# Patient Record
Sex: FEMALE | Race: WHITE | NOT HISPANIC OR LATINO
[De-identification: names, ages, dates, MRNs, and addresses within clinical notes are randomized per-mention and may not be internally consistent; named-entity substitution may affect disease eponyms.]

---

## 2017-04-02 ENCOUNTER — RESULT REVIEW (OUTPATIENT)
Age: 72
End: 2017-04-02

## 2017-04-03 ENCOUNTER — APPOINTMENT (OUTPATIENT)
Dept: OBGYN | Facility: CLINIC | Age: 72
End: 2017-04-03

## 2019-02-20 ENCOUNTER — TRANSCRIPTION ENCOUNTER (OUTPATIENT)
Age: 74
End: 2019-02-20

## 2019-02-20 ENCOUNTER — APPOINTMENT (OUTPATIENT)
Dept: ORTHOPEDIC SURGERY | Facility: CLINIC | Age: 74
End: 2019-02-20
Payer: MEDICARE

## 2019-02-20 VITALS
DIASTOLIC BLOOD PRESSURE: 75 MMHG | HEIGHT: 66 IN | HEART RATE: 60 BPM | SYSTOLIC BLOOD PRESSURE: 131 MMHG | WEIGHT: 130 LBS | BODY MASS INDEX: 20.89 KG/M2

## 2019-02-20 VITALS — DIASTOLIC BLOOD PRESSURE: 74 MMHG | SYSTOLIC BLOOD PRESSURE: 122 MMHG

## 2019-02-20 DIAGNOSIS — Z87.39 PERSONAL HISTORY OF OTHER DISEASES OF THE MUSCULOSKELETAL SYSTEM AND CONNECTIVE TISSUE: ICD-10-CM

## 2019-02-20 DIAGNOSIS — M25.561 PAIN IN RIGHT KNEE: ICD-10-CM

## 2019-02-20 PROCEDURE — 99204 OFFICE O/P NEW MOD 45 MIN: CPT

## 2019-02-20 PROCEDURE — 73564 X-RAY EXAM KNEE 4 OR MORE: CPT | Mod: RT

## 2019-02-20 PROCEDURE — 73502 X-RAY EXAM HIP UNI 2-3 VIEWS: CPT | Mod: RT

## 2019-02-20 NOTE — PHYSICAL EXAM
[de-identified] : Patient is well nourished, well-developed, in no acute distress, with appropriate mood and affect. The patient is oriented to time, place, and person. Respirations are even and unlabored. Gait evaluation reveals a limp. There is no inguinal adenopathy. Examination of the contralateral hip shows normal range of motion, strength, no tenderness, and intact skin. The affected limb is well-perfused, shows a grossly normal motor and sensory examination. Examination of the hip shows no skin lesions. Hip motion is reduced and causes pain. Leg lengths are approximately short on the right by 1 cm. Stinchfield test is positive. Both hips are stable and muscle strength is normal.  Right knee motion is painless and the knee moves from 0-135 degrees. The knee is stable within that range-of-motion to AP and ML stress with a 1A Lachman, negative anterior or posterior drawer and no instability to varus or valgus stress. The alignment of the knee is 5 degrees of varus. No effusion or crepitus is noted. No tenderness to palpation about the medial or lateral joint line, medial or lateral tibial plateau, medial or lateral femoral condyle, medial or lateral patellar facets, superior or inferior pole of the patella. Lavell's is negative. Muscle strength is normal. Pedal pulses are palpable.  [de-identified] : AP and lateral x-rays of the right hip, pelvis, and femur were ordered and taken in the office and demonstrate severe degenerative joint disease of the hip with joint space narrowing, osteophyte formation, and subchondral sclerosis.\par \par Long standing knee, AP knee, lateral knee, and patellar views of the right knee were ordered and taken in the office and demonstrate no evidence of degenerative joint disease of the knee, fractures, intra-articular pathology.

## 2019-02-20 NOTE — DISCUSSION/SUMMARY
[de-identified] : This patient has severe right hip osteoarthritis.  She has failed a course of conservative management and would like to proceed with a direct anterior approach right total hip arthroplasty.\par \par The patient is an appropriate candidate for consideration of right total hip replacement. An extensive discussion was conducted of the natural history of the disease and the variety of surgical and non-surgical treatment options available to the patient. A risk/benefit analysis was discussed with the patient reviewing the advantages and disadvantages of surgical intervention at this time. Both the level and length of the patient's pain have made additional conservative treatment measures consisting of physical therapy, and/or corticosteroids contraindicated. A full explanation was given of the nature and the purpose of the procedure and anesthesia, its benefits, possible alternative methods of diagnosis or treatment, the risks involved, the possibility of complications, the foreseeable consequences of the procedure and the possible results of the non-treatment.\par \par No guarantee or assurance was made as to the results that may be obtained. Specifically, the risks were identified to include, but are not limited to the following: Infection, phlebitis, pulmonary embolism, death, paralysis, dislocation, pain, stiffness, instability, limp, weakness, breakage, leg-length inequality, uncontrolled bleeding, nerve injury, blood vessel injury, pressure sores, anesthetic risks, delayed healing of wound and bone, and wear and loosening. Further discussion was undertaken with the patient about the details of surgical preparation, treatment, and postoperative rehabilitation including medical clearance, autotransfusion, the hospital course, and the postoperative rehabilitation involved. All in all, I feel that this patient is a good candidate for surgical reconstruction.\par

## 2019-02-20 NOTE — HISTORY OF PRESENT ILLNESS
[de-identified] : This is very nice 73-year-old female experiencing 3 years of right hip pain, which is severe in intensity. The pain substantially limits activities of daily living. Walking tolerance is reduced.  She has taken diclofenac in the past and is now on Celebrex which helps moderately.  The pain radiates to the groin and thigh into the knee.  She is also tried to intra-articular cortisone injections with the last injection being performed approximately 8 months ago which helped for only 2-3 weeks.  Physical therapy has been tried in the past which has not helped and in fact worsened the pain.  She does not use a cane or walker.  She has difficulty sleeping at night.  She has difficulty rising from a chair and walking up and down stairs secondary to the pain.  The pain does not radiate below the knee and it is not associated with numbness or tingling or weakness.

## 2019-03-05 ENCOUNTER — APPOINTMENT (OUTPATIENT)
Dept: ORTHOPEDIC SURGERY | Facility: CLINIC | Age: 74
End: 2019-03-05
Payer: MEDICARE

## 2019-03-05 VITALS — HEIGHT: 66 IN | BODY MASS INDEX: 20.89 KG/M2 | WEIGHT: 130 LBS

## 2019-03-05 DIAGNOSIS — M54.16 RADICULOPATHY, LUMBAR REGION: ICD-10-CM

## 2019-03-05 DIAGNOSIS — Z82.61 FAMILY HISTORY OF ARTHRITIS: ICD-10-CM

## 2019-03-05 PROCEDURE — 99213 OFFICE O/P EST LOW 20 MIN: CPT

## 2019-03-05 RX ORDER — MULTIVITAMIN
TABLET ORAL
Refills: 0 | Status: ACTIVE | COMMUNITY

## 2019-03-05 NOTE — DISCUSSION/SUMMARY
[de-identified] : This patient has severe right hip osteoarthritis.  She has failed a course of conservative management and would like to proceed with a direct anterior approach right total hip arthroplasty.\par \par The patient is an appropriate candidate for consideration of right total hip replacement. An extensive discussion was conducted of the natural history of the disease and the variety of surgical and non-surgical treatment options available to the patient. A risk/benefit analysis was discussed with the patient reviewing the advantages and disadvantages of surgical intervention at this time. Both the level and length of the patient's pain have made additional conservative treatment measures consisting of physical therapy, and/or corticosteroids contraindicated. A full explanation was given of the nature and the purpose of the procedure and anesthesia, its benefits, possible alternative methods of diagnosis or treatment, the risks involved, the possibility of complications, the foreseeable consequences of the procedure and the possible results of the non-treatment.\par \par No guarantee or assurance was made as to the results that may be obtained. Specifically, the risks were identified to include, but are not limited to the following: Infection, phlebitis, pulmonary embolism, death, paralysis, dislocation, pain, stiffness, instability, limp, weakness, breakage, leg-length inequality, uncontrolled bleeding, nerve injury, blood vessel injury, pressure sores, anesthetic risks, delayed healing of wound and bone, and wear and loosening. Further discussion was undertaken with the patient about the details of surgical preparation, treatment, and postoperative rehabilitation including medical clearance, autotransfusion, the hospital course, and the postoperative rehabilitation involved. All in all, I feel that this patient is a good candidate for surgical reconstruction.\par \par Should her right lower extremity radiculopathy is now flaring.  I have given her a physiatry referral for further workup for this.\par

## 2019-03-05 NOTE — PHYSICAL EXAM
[de-identified] : Patient is well nourished, well-developed, in no acute distress, with appropriate mood and affect. The patient is oriented to time, place, and person. Respirations are even and unlabored. Gait evaluation reveals a limp. There is no inguinal adenopathy. Examination of the contralateral hip shows normal range of motion, strength, no tenderness, and intact skin. The affected limb is well-perfused, shows a grossly normal motor and sensory examination. Examination of the hip shows no skin lesions. Hip motion is reduced and causes pain. Leg lengths are approximately short on the right by 1 cm. Stinchfield test is positive. Both hips are stable and muscle strength is normal.  Right knee motion is painless and the knee moves from 0-135 degrees. The knee is stable within that range-of-motion to AP and ML stress with a 1A Lachman, negative anterior or posterior drawer and no instability to varus or valgus stress. The alignment of the knee is 5 degrees of varus. No effusion or crepitus is noted. No tenderness to palpation about the medial or lateral joint line, medial or lateral tibial plateau, medial or lateral femoral condyle, medial or lateral patellar facets, superior or inferior pole of the patella. Lavell's is negative. Muscle strength is normal. Pedal pulses are palpable. \par Examination of the lumbar spine reveals full range of motion without pain. There is no tenderness to palpation of the osseous structures or paravertebral soft tissues. There is no muscle spasm. Straight leg raise is negative bilaterally. [de-identified] : AP and lateral x-rays of the right hip, pelvis, and femur were reviewed from the previous visit and demonstrate degenerative joint disease of the hip with joint space narrowing, osteophyte formation, and subchondral sclerosis.\par

## 2019-03-05 NOTE — HISTORY OF PRESENT ILLNESS
[de-identified] : This is very nice 73-year-old female experiencing 3 years of right hip pain, which is severe in intensity.  I have previously diagnosed her with right hip osteoarthritis.  The pain substantially limits activities of daily living. Walking tolerance is reduced.  She has taking diclofenac as opposed to Celebrex which helps moderately.  The pain radiates to the groin and thigh into the knee.  Her last intra-articular cortisone injection was 2 months ago.  This did not help to improve the pain. Physical therapy has been tried in the past which has not helped and in fact worsened the pain.  She does not use a cane or walker.  She has difficulty sleeping at night.  She has difficulty rising from a chair and walking up and down stairs secondary to the pain.  Now the pain does radiate to the foot but it is not associated with numbness or tingling or.  She does have low back pain.  No bowel or bladder incontinence.

## 2019-03-06 ENCOUNTER — APPOINTMENT (OUTPATIENT)
Dept: MRI IMAGING | Facility: CLINIC | Age: 74
End: 2019-03-06
Payer: MEDICARE

## 2019-03-06 ENCOUNTER — OUTPATIENT (OUTPATIENT)
Dept: OUTPATIENT SERVICES | Facility: HOSPITAL | Age: 74
LOS: 1 days | End: 2019-03-06
Payer: MEDICARE

## 2019-03-06 ENCOUNTER — TRANSCRIPTION ENCOUNTER (OUTPATIENT)
Age: 74
End: 2019-03-06

## 2019-03-06 DIAGNOSIS — Z00.8 ENCOUNTER FOR OTHER GENERAL EXAMINATION: ICD-10-CM

## 2019-03-06 PROCEDURE — 72148 MRI LUMBAR SPINE W/O DYE: CPT | Mod: 26

## 2019-03-06 PROCEDURE — 72148 MRI LUMBAR SPINE W/O DYE: CPT

## 2019-04-01 ENCOUNTER — OUTPATIENT (OUTPATIENT)
Dept: OUTPATIENT SERVICES | Facility: HOSPITAL | Age: 74
LOS: 1 days | End: 2019-04-01
Payer: MEDICARE

## 2019-04-01 VITALS
OXYGEN SATURATION: 99 % | RESPIRATION RATE: 20 BRPM | TEMPERATURE: 98 F | HEIGHT: 66 IN | SYSTOLIC BLOOD PRESSURE: 121 MMHG | WEIGHT: 134.92 LBS | DIASTOLIC BLOOD PRESSURE: 78 MMHG | HEART RATE: 69 BPM

## 2019-04-01 DIAGNOSIS — Z01.818 ENCOUNTER FOR OTHER PREPROCEDURAL EXAMINATION: ICD-10-CM

## 2019-04-01 DIAGNOSIS — M19.90 UNSPECIFIED OSTEOARTHRITIS, UNSPECIFIED SITE: ICD-10-CM

## 2019-04-01 DIAGNOSIS — Z90.721 ACQUIRED ABSENCE OF OVARIES, UNILATERAL: Chronic | ICD-10-CM

## 2019-04-01 DIAGNOSIS — M16.11 UNILATERAL PRIMARY OSTEOARTHRITIS, RIGHT HIP: ICD-10-CM

## 2019-04-01 LAB
ANION GAP SERPL CALC-SCNC: 10 MMOL/L — SIGNIFICANT CHANGE UP (ref 5–17)
BLD GP AB SCN SERPL QL: NEGATIVE — SIGNIFICANT CHANGE UP
BUN SERPL-MCNC: 20 MG/DL — SIGNIFICANT CHANGE UP (ref 7–23)
CALCIUM SERPL-MCNC: 9.7 MG/DL — SIGNIFICANT CHANGE UP (ref 8.4–10.5)
CHLORIDE SERPL-SCNC: 104 MMOL/L — SIGNIFICANT CHANGE UP (ref 96–108)
CO2 SERPL-SCNC: 27 MMOL/L — SIGNIFICANT CHANGE UP (ref 22–31)
CREAT SERPL-MCNC: 0.72 MG/DL — SIGNIFICANT CHANGE UP (ref 0.5–1.3)
GLUCOSE SERPL-MCNC: 92 MG/DL — SIGNIFICANT CHANGE UP (ref 70–99)
HBA1C BLD-MCNC: 5.2 % — SIGNIFICANT CHANGE UP (ref 4–5.6)
HCT VFR BLD CALC: 41 % — SIGNIFICANT CHANGE UP (ref 34.5–45)
HGB BLD-MCNC: 13.5 G/DL — SIGNIFICANT CHANGE UP (ref 11.5–15.5)
MCHC RBC-ENTMCNC: 30.5 PG — SIGNIFICANT CHANGE UP (ref 27–34)
MCHC RBC-ENTMCNC: 32.9 GM/DL — SIGNIFICANT CHANGE UP (ref 32–36)
MCV RBC AUTO: 92.8 FL — SIGNIFICANT CHANGE UP (ref 80–100)
MRSA PCR RESULT.: SIGNIFICANT CHANGE UP
PLATELET # BLD AUTO: 186 K/UL — SIGNIFICANT CHANGE UP (ref 150–400)
POTASSIUM SERPL-MCNC: 4.2 MMOL/L — SIGNIFICANT CHANGE UP (ref 3.5–5.3)
POTASSIUM SERPL-SCNC: 4.2 MMOL/L — SIGNIFICANT CHANGE UP (ref 3.5–5.3)
RBC # BLD: 4.42 M/UL — SIGNIFICANT CHANGE UP (ref 3.8–5.2)
RBC # FLD: 13.1 % — SIGNIFICANT CHANGE UP (ref 10.3–14.5)
RH IG SCN BLD-IMP: POSITIVE — SIGNIFICANT CHANGE UP
S AUREUS DNA NOSE QL NAA+PROBE: SIGNIFICANT CHANGE UP
SODIUM SERPL-SCNC: 141 MMOL/L — SIGNIFICANT CHANGE UP (ref 135–145)
WBC # BLD: 6.06 K/UL — SIGNIFICANT CHANGE UP (ref 3.8–10.5)
WBC # FLD AUTO: 6.06 K/UL — SIGNIFICANT CHANGE UP (ref 3.8–10.5)

## 2019-04-01 PROCEDURE — 87641 MR-STAPH DNA AMP PROBE: CPT

## 2019-04-01 PROCEDURE — 85027 COMPLETE CBC AUTOMATED: CPT

## 2019-04-01 PROCEDURE — 83036 HEMOGLOBIN GLYCOSYLATED A1C: CPT

## 2019-04-01 PROCEDURE — G0463: CPT

## 2019-04-01 PROCEDURE — 86900 BLOOD TYPING SEROLOGIC ABO: CPT

## 2019-04-01 PROCEDURE — 86850 RBC ANTIBODY SCREEN: CPT

## 2019-04-01 PROCEDURE — 80048 BASIC METABOLIC PNL TOTAL CA: CPT

## 2019-04-01 PROCEDURE — 87640 STAPH A DNA AMP PROBE: CPT

## 2019-04-01 PROCEDURE — 86901 BLOOD TYPING SEROLOGIC RH(D): CPT

## 2019-04-01 RX ORDER — CEFAZOLIN SODIUM 1 G
2000 VIAL (EA) INJECTION ONCE
Qty: 0 | Refills: 0 | Status: DISCONTINUED | OUTPATIENT
Start: 2019-04-11 | End: 2019-04-12

## 2019-04-01 NOTE — H&P PST ADULT - ASSESSMENT
CAPRINI SCORE [CLOT]    AGE RELATED RISK FACTORS                                                       MOBILITY RELATED FACTORS  [ ] Age 41-60 years                                            (1 Point)                  [ ] Bed rest                                                        (1 Point)  [x ] Age: 61-74 years                                           (2 Points)                 [ ] Plaster cast                                                   (2 Points)  [ ] Age= 75 years                                              (3 Points)                 [ ] Bed bound for more than 72 hours                 (2 Points)    DISEASE RELATED RISK FACTORS                                               GENDER SPECIFIC FACTORS  [ ] Edema in the lower extremities                       (1 Point)                  [ ] Pregnancy                                                     (1 Point)  [ ] Varicose veins                                               (1 Point)                  [ ] Post-partum < 6 weeks                                   (1 Point)             [ ] BMI > 25 Kg/m2                                            (1 Point)                  [ ] Hormonal therapy  or oral contraception          (1 Point)                 [ ] Sepsis (in the previous month)                        (1 Point)                  [ ] History of pregnancy complications                 (1 point)  [ ] Pneumonia or serious lung disease                                               [ ] Unexplained or recurrent                     (1 Point)           (in the previous month)                               (1 Point)  [ ] Abnormal pulmonary function test                     (1 Point)                 SURGERY RELATED RISK FACTORS  [ ] Acute myocardial infarction                              (1 Point)                 [ ]  Section                                             (1 Point)  [ ] Congestive heart failure (in the previous month)  (1 Point)               [ ] Minor surgery                                                  (1 Point)   [ ] Inflammatory bowel disease                             (1 Point)                 [ ] Arthroscopic surgery                                        (2 Points)  [ ] Central venous access                                      (2 Points)                [x ] General surgery lasting more than 45 minutes   (2 Points)       [ ] Stroke (in the previous month)                          (5 Points)               [ ] Elective arthroplasty                                         (5 Points)                                                                                                                                               HEMATOLOGY RELATED FACTORS                                                 TRAUMA RELATED RISK FACTORS  [ ] Prior episodes of VTE                                     (3 Points)                 [ ] Fracture of the hip, pelvis, or leg                       (5 Points)  [ ] Positive family history for VTE                         (3 Points)                 [ ] Acute spinal cord injury (in the previous month)  (5 Points)  [ ] Prothrombin 09886 A                                     (3 Points)                 [ ] Paralysis  (less than 1 month)                             (5 Points)  [ ] Factor V Leiden                                             (3 Points)                  [ ] Multiple Trauma within 1 month                        (5 Points)  [ ] Lupus anticoagulants                                     (3 Points)                                                           [ ] Anticardiolipin antibodies                               (3 Points)                                                       [ ] High homocysteine in the blood                      (3 Points)                                             [ ] Other congenital or acquired thrombophilia      (3 Points)                                                [ ] Heparin induced thrombocytopenia                  (3 Points)                                          Total Score [   4       ]

## 2019-04-01 NOTE — H&P PST ADULT - NSICDXPROBLEM_GEN_ALL_CORE_FT
PROBLEM DIAGNOSES  Problem: OA (osteoarthritis)  Assessment and Plan: Right THR   cbc, bmp, T & S, MRSA   dental clearance in chart       R/O PROBLEM DIAGNOSES  Problem: Prophylactic measure  Assessment and Plan: The Caprini score indicates this patient is at risk for a VTE event (score 3-5).  Most surgical patients in this group would benefit from pharmacologic prophylaxis.  The surgical team will determine the balance between VTE risk and bleeding risk

## 2019-04-08 ENCOUNTER — APPOINTMENT (OUTPATIENT)
Dept: ORTHOPEDIC SURGERY | Facility: CLINIC | Age: 74
End: 2019-04-08
Payer: MEDICARE

## 2019-04-08 PROBLEM — M19.90 UNSPECIFIED OSTEOARTHRITIS, UNSPECIFIED SITE: Chronic | Status: ACTIVE | Noted: 2019-04-01

## 2019-04-08 PROCEDURE — 99213 OFFICE O/P EST LOW 20 MIN: CPT | Mod: PD

## 2019-04-08 RX ORDER — SUMATRIPTAN 100 MG/1
100 TABLET, FILM COATED ORAL
Qty: 9 | Refills: 0 | Status: ACTIVE | COMMUNITY
Start: 2018-03-07

## 2019-04-08 NOTE — HISTORY OF PRESENT ILLNESS
[de-identified] : This is very nice 73-year-old female experiencing 3 years of right hip pain, which is severe in intensity.  I have previously diagnosed her with right hip osteoarthritis.  We are planning a hip replacement this Thursday for her. The pain substantially limits activities of daily living. Walking tolerance is reduced.  She has taking diclofenac as opposed to Celebrex which helps moderately.  The pain radiates to the groin and thigh into the knee.  Her last intra-articular cortisone injection was 3 months ago.  This did not help to improve the pain. Physical therapy has been tried in the past which has not helped and in fact worsened the pain.  She does not use a cane or walker.  She has difficulty sleeping at night.  She has difficulty rising from a chair and walking up and down stairs secondary to the pain.  Now the pain does radiate to the foot but it is not associated with numbness or tingling or.  She does have low back pain.  No bowel or bladder incontinence.  Also has some right knee pain.

## 2019-04-08 NOTE — DISCUSSION/SUMMARY
[de-identified] : This patient has severe right hip osteoarthritis.  She has failed a course of conservative management and would like to proceed with a direct anterior approach right total hip arthroplasty.\par \par The patient is an appropriate candidate for consideration of right total hip replacement. An extensive discussion was conducted of the natural history of the disease and the variety of surgical and non-surgical treatment options available to the patient. A risk/benefit analysis was discussed with the patient reviewing the advantages and disadvantages of surgical intervention at this time. Both the level and length of the patient's pain have made additional conservative treatment measures consisting of physical therapy, and/or corticosteroids contraindicated. A full explanation was given of the nature and the purpose of the procedure and anesthesia, its benefits, possible alternative methods of diagnosis or treatment, the risks involved, the possibility of complications, the foreseeable consequences of the procedure and the possible results of the non-treatment.\par \par No guarantee or assurance was made as to the results that may be obtained. Specifically, the risks were identified to include, but are not limited to the following: Infection, phlebitis, pulmonary embolism, death, paralysis, dislocation, pain, stiffness, instability, limp, weakness, breakage, leg-length inequality, uncontrolled bleeding, nerve injury, blood vessel injury, pressure sores, anesthetic risks, delayed healing of wound and bone, and wear and loosening. Further discussion was undertaken with the patient about the details of surgical preparation, treatment, and postoperative rehabilitation including medical clearance, autotransfusion, the hospital course, and the postoperative rehabilitation involved. All in all, I feel that this patient is a good candidate for surgical reconstruction.

## 2019-04-10 ENCOUNTER — TRANSCRIPTION ENCOUNTER (OUTPATIENT)
Age: 74
End: 2019-04-10

## 2019-04-10 PROBLEM — M16.11 ARTHRITIS OF RIGHT HIP: Status: RESOLVED | Noted: 2019-02-20 | Resolved: 2019-04-10

## 2019-04-10 PROBLEM — M25.551 RIGHT HIP PAIN: Status: RESOLVED | Noted: 2019-02-19 | Resolved: 2019-04-10

## 2019-04-11 ENCOUNTER — APPOINTMENT (OUTPATIENT)
Dept: ORTHOPEDIC SURGERY | Facility: HOSPITAL | Age: 74
End: 2019-04-11

## 2019-04-11 ENCOUNTER — INPATIENT (INPATIENT)
Facility: HOSPITAL | Age: 74
LOS: 0 days | Discharge: HOME | DRG: 470 | End: 2019-04-12
Attending: ORTHOPAEDIC SURGERY | Admitting: ORTHOPAEDIC SURGERY
Payer: MEDICARE

## 2019-04-11 VITALS
SYSTOLIC BLOOD PRESSURE: 132 MMHG | DIASTOLIC BLOOD PRESSURE: 78 MMHG | RESPIRATION RATE: 18 BRPM | HEART RATE: 66 BPM | TEMPERATURE: 98 F

## 2019-04-11 DIAGNOSIS — M25.551 PAIN IN RIGHT HIP: ICD-10-CM

## 2019-04-11 DIAGNOSIS — M16.11 UNILATERAL PRIMARY OSTEOARTHRITIS, RIGHT HIP: ICD-10-CM

## 2019-04-11 DIAGNOSIS — Z90.721 ACQUIRED ABSENCE OF OVARIES, UNILATERAL: Chronic | ICD-10-CM

## 2019-04-11 PROCEDURE — 72170 X-RAY EXAM OF PELVIS: CPT | Mod: 26

## 2019-04-11 PROCEDURE — 27130 TOTAL HIP ARTHROPLASTY: CPT | Mod: RT

## 2019-04-11 RX ORDER — SODIUM CHLORIDE 9 MG/ML
3 INJECTION INTRAMUSCULAR; INTRAVENOUS; SUBCUTANEOUS EVERY 8 HOURS
Qty: 0 | Refills: 0 | Status: DISCONTINUED | OUTPATIENT
Start: 2019-04-11 | End: 2019-04-11

## 2019-04-11 RX ORDER — KETOROLAC TROMETHAMINE 30 MG/ML
15 SYRINGE (ML) INJECTION EVERY 8 HOURS
Qty: 0 | Refills: 0 | Status: DISCONTINUED | OUTPATIENT
Start: 2019-04-11 | End: 2019-04-12

## 2019-04-11 RX ORDER — PANTOPRAZOLE SODIUM 20 MG/1
40 TABLET, DELAYED RELEASE ORAL
Qty: 0 | Refills: 0 | Status: DISCONTINUED | OUTPATIENT
Start: 2019-04-11 | End: 2019-04-12

## 2019-04-11 RX ORDER — SENNA PLUS 8.6 MG/1
2 TABLET ORAL AT BEDTIME
Qty: 0 | Refills: 0 | Status: DISCONTINUED | OUTPATIENT
Start: 2019-04-11 | End: 2019-04-12

## 2019-04-11 RX ORDER — ASCORBIC ACID 60 MG
500 TABLET,CHEWABLE ORAL
Qty: 0 | Refills: 0 | Status: DISCONTINUED | OUTPATIENT
Start: 2019-04-11 | End: 2019-04-12

## 2019-04-11 RX ORDER — SODIUM CHLORIDE 9 MG/ML
1000 INJECTION, SOLUTION INTRAVENOUS
Qty: 0 | Refills: 0 | Status: DISCONTINUED | OUTPATIENT
Start: 2019-04-11 | End: 2019-04-12

## 2019-04-11 RX ORDER — CELECOXIB 200 MG/1
200 CAPSULE ORAL EVERY 12 HOURS
Qty: 0 | Refills: 0 | Status: DISCONTINUED | OUTPATIENT
Start: 2019-04-11 | End: 2019-04-11

## 2019-04-11 RX ORDER — ACETAMINOPHEN 500 MG
1000 TABLET ORAL ONCE
Qty: 0 | Refills: 0 | Status: COMPLETED | OUTPATIENT
Start: 2019-04-12 | End: 2019-04-12

## 2019-04-11 RX ORDER — SODIUM CHLORIDE 9 MG/ML
500 INJECTION INTRAMUSCULAR; INTRAVENOUS; SUBCUTANEOUS
Qty: 0 | Refills: 0 | Status: DISCONTINUED | OUTPATIENT
Start: 2019-04-11 | End: 2019-04-11

## 2019-04-11 RX ORDER — GABAPENTIN 400 MG/1
100 CAPSULE ORAL ONCE
Qty: 0 | Refills: 0 | Status: COMPLETED | OUTPATIENT
Start: 2019-04-11 | End: 2019-04-11

## 2019-04-11 RX ORDER — DOCUSATE SODIUM 100 MG
100 CAPSULE ORAL THREE TIMES A DAY
Qty: 0 | Refills: 0 | Status: DISCONTINUED | OUTPATIENT
Start: 2019-04-11 | End: 2019-04-12

## 2019-04-11 RX ORDER — MAGNESIUM HYDROXIDE 400 MG/1
30 TABLET, CHEWABLE ORAL DAILY
Qty: 0 | Refills: 0 | Status: DISCONTINUED | OUTPATIENT
Start: 2019-04-11 | End: 2019-04-12

## 2019-04-11 RX ORDER — CELECOXIB 200 MG/1
200 CAPSULE ORAL EVERY 12 HOURS
Qty: 0 | Refills: 0 | Status: CANCELLED | OUTPATIENT
Start: 2019-04-14 | End: 2019-04-12

## 2019-04-11 RX ORDER — ACETAMINOPHEN 500 MG
975 TABLET ORAL EVERY 8 HOURS
Qty: 0 | Refills: 0 | Status: DISCONTINUED | OUTPATIENT
Start: 2019-04-11 | End: 2019-04-11

## 2019-04-11 RX ORDER — SUMATRIPTAN SUCCINATE 4 MG/.5ML
50 INJECTION, SOLUTION SUBCUTANEOUS DAILY
Qty: 0 | Refills: 0 | Status: DISCONTINUED | OUTPATIENT
Start: 2019-04-11 | End: 2019-04-12

## 2019-04-11 RX ORDER — CEFAZOLIN SODIUM 1 G
2000 VIAL (EA) INJECTION EVERY 8 HOURS
Qty: 0 | Refills: 0 | Status: COMPLETED | OUTPATIENT
Start: 2019-04-11 | End: 2019-04-12

## 2019-04-11 RX ORDER — DEXAMETHASONE 0.5 MG/5ML
8 ELIXIR ORAL ONCE
Qty: 0 | Refills: 0 | Status: COMPLETED | OUTPATIENT
Start: 2019-04-12 | End: 2019-04-12

## 2019-04-11 RX ORDER — FERROUS SULFATE 325(65) MG
325 TABLET ORAL
Qty: 0 | Refills: 0 | Status: DISCONTINUED | OUTPATIENT
Start: 2019-04-11 | End: 2019-04-12

## 2019-04-11 RX ORDER — TRAMADOL HYDROCHLORIDE 50 MG/1
50 TABLET ORAL ONCE
Qty: 0 | Refills: 0 | Status: DISCONTINUED | OUTPATIENT
Start: 2019-04-11 | End: 2019-04-11

## 2019-04-11 RX ORDER — TRAMADOL HYDROCHLORIDE 50 MG/1
50 TABLET ORAL EVERY 6 HOURS
Qty: 0 | Refills: 0 | Status: DISCONTINUED | OUTPATIENT
Start: 2019-04-11 | End: 2019-04-12

## 2019-04-11 RX ORDER — ACETAMINOPHEN 500 MG
975 TABLET ORAL ONCE
Qty: 0 | Refills: 0 | Status: COMPLETED | OUTPATIENT
Start: 2019-04-11 | End: 2019-04-11

## 2019-04-11 RX ORDER — OXYCODONE HYDROCHLORIDE 5 MG/1
10 TABLET ORAL EVERY 4 HOURS
Qty: 0 | Refills: 0 | Status: DISCONTINUED | OUTPATIENT
Start: 2019-04-11 | End: 2019-04-12

## 2019-04-11 RX ORDER — ACETAMINOPHEN 500 MG
1000 TABLET ORAL ONCE
Qty: 0 | Refills: 0 | Status: COMPLETED | OUTPATIENT
Start: 2019-04-11 | End: 2019-04-11

## 2019-04-11 RX ORDER — OXYCODONE HYDROCHLORIDE 5 MG/1
5 TABLET ORAL EVERY 4 HOURS
Qty: 0 | Refills: 0 | Status: DISCONTINUED | OUTPATIENT
Start: 2019-04-11 | End: 2019-04-12

## 2019-04-11 RX ORDER — SODIUM CHLORIDE 9 MG/ML
500 INJECTION INTRAMUSCULAR; INTRAVENOUS; SUBCUTANEOUS ONCE
Qty: 0 | Refills: 0 | Status: COMPLETED | OUTPATIENT
Start: 2019-04-11 | End: 2019-04-11

## 2019-04-11 RX ORDER — ACETAMINOPHEN 500 MG
975 TABLET ORAL EVERY 8 HOURS
Qty: 0 | Refills: 0 | Status: DISCONTINUED | OUTPATIENT
Start: 2019-04-12 | End: 2019-04-12

## 2019-04-11 RX ORDER — ONDANSETRON 8 MG/1
4 TABLET, FILM COATED ORAL EVERY 6 HOURS
Qty: 0 | Refills: 0 | Status: DISCONTINUED | OUTPATIENT
Start: 2019-04-11 | End: 2019-04-12

## 2019-04-11 RX ORDER — PANTOPRAZOLE SODIUM 20 MG/1
40 TABLET, DELAYED RELEASE ORAL ONCE
Qty: 0 | Refills: 0 | Status: COMPLETED | OUTPATIENT
Start: 2019-04-11 | End: 2019-04-11

## 2019-04-11 RX ORDER — SODIUM CHLORIDE 9 MG/ML
500 INJECTION INTRAMUSCULAR; INTRAVENOUS; SUBCUTANEOUS ONCE
Qty: 0 | Refills: 0 | Status: COMPLETED | OUTPATIENT
Start: 2019-04-12 | End: 2019-04-12

## 2019-04-11 RX ORDER — POLYETHYLENE GLYCOL 3350 17 G/17G
17 POWDER, FOR SOLUTION ORAL DAILY
Qty: 0 | Refills: 0 | Status: DISCONTINUED | OUTPATIENT
Start: 2019-04-11 | End: 2019-04-12

## 2019-04-11 RX ORDER — ASPIRIN/CALCIUM CARB/MAGNESIUM 324 MG
325 TABLET ORAL
Qty: 0 | Refills: 0 | Status: DISCONTINUED | OUTPATIENT
Start: 2019-04-11 | End: 2019-04-12

## 2019-04-11 RX ORDER — FOLIC ACID 0.8 MG
1 TABLET ORAL DAILY
Qty: 0 | Refills: 0 | Status: DISCONTINUED | OUTPATIENT
Start: 2019-04-11 | End: 2019-04-12

## 2019-04-11 RX ORDER — KETOROLAC TROMETHAMINE 30 MG/ML
30 SYRINGE (ML) INJECTION EVERY 8 HOURS
Qty: 0 | Refills: 0 | Status: DISCONTINUED | OUTPATIENT
Start: 2019-04-11 | End: 2019-04-11

## 2019-04-11 RX ADMIN — Medication 15 MILLIGRAM(S): at 22:09

## 2019-04-11 RX ADMIN — Medication 100 MILLIGRAM(S): at 23:51

## 2019-04-11 RX ADMIN — PANTOPRAZOLE SODIUM 40 MILLIGRAM(S): 20 TABLET, DELAYED RELEASE ORAL at 12:19

## 2019-04-11 RX ADMIN — TRAMADOL HYDROCHLORIDE 50 MILLIGRAM(S): 50 TABLET ORAL at 14:10

## 2019-04-11 RX ADMIN — SODIUM CHLORIDE 1000 MILLILITER(S): 9 INJECTION INTRAMUSCULAR; INTRAVENOUS; SUBCUTANEOUS at 20:32

## 2019-04-11 RX ADMIN — ONDANSETRON 4 MILLIGRAM(S): 8 TABLET, FILM COATED ORAL at 19:10

## 2019-04-11 RX ADMIN — Medication 325 MILLIGRAM(S): at 18:19

## 2019-04-11 RX ADMIN — GABAPENTIN 100 MILLIGRAM(S): 400 CAPSULE ORAL at 12:19

## 2019-04-11 RX ADMIN — Medication 1000 MILLIGRAM(S): at 18:23

## 2019-04-11 RX ADMIN — Medication 500 MILLIGRAM(S): at 18:22

## 2019-04-11 RX ADMIN — Medication 975 MILLIGRAM(S): at 12:19

## 2019-04-11 RX ADMIN — SODIUM CHLORIDE 1000 MILLILITER(S): 9 INJECTION INTRAMUSCULAR; INTRAVENOUS; SUBCUTANEOUS at 18:00

## 2019-04-11 RX ADMIN — SODIUM CHLORIDE 75 MILLILITER(S): 9 INJECTION, SOLUTION INTRAVENOUS at 18:45

## 2019-04-11 RX ADMIN — Medication 325 MILLIGRAM(S): at 18:24

## 2019-04-11 RX ADMIN — Medication 15 MILLIGRAM(S): at 22:39

## 2019-04-11 RX ADMIN — Medication 400 MILLIGRAM(S): at 18:19

## 2019-04-11 RX ADMIN — Medication 100 MILLIGRAM(S): at 18:24

## 2019-04-11 NOTE — PHYSICAL THERAPY INITIAL EVALUATION ADULT - PERTINENT HX OF CURRENT PROBLEM, REHAB EVAL
72 y/o F with no significant PMH presents with c/o R hip pain for 3 years. Pt diagnosed with R hip osteoarthritis. Pt now presents s/p R THR.

## 2019-04-11 NOTE — PHYSICAL THERAPY INITIAL EVALUATION ADULT - ASR EQUIP NEEDS DISCH PT EVAL
pt given straight cane; as per MD Robles pt to be not d/c'ed with RW if independent with straight cane

## 2019-04-11 NOTE — PATIENT PROFILE ADULT - NSPROGENARRIVEDFROM_GEN_A_NUR
Problem: Goal Outcome Summary  Goal: Goal Outcome Summary  Outcome: Improving  VSS, A&Ox4. CMS intact, dressing CDI. Up A1 to BR, voiding adequately. Scheduled tylenol and flexeril for pain control. Anticoagulant therapy maintained. Pt slept between cares, will continue to monitor.         home

## 2019-04-11 NOTE — PHYSICAL THERAPY INITIAL EVALUATION ADULT - ADDITIONAL COMMENTS
Pt lives in private home with flight to enter from garage and 6 additional steps inside home. Pt lives with spouse who she states can assist her at home. Prior to admission pt was independent with all functional mobility and did not use an AD to ambulate.

## 2019-04-11 NOTE — PHYSICAL THERAPY INITIAL EVALUATION ADULT - PLANNED THERAPY INTERVENTIONS, PT EVAL
GOAL: Pt will independently negotiate stairs with 1 handrail with step over step pattern in 2wks./balance training/strengthening/gait training

## 2019-04-11 NOTE — CHART NOTE - NSCHARTNOTEFT_GEN_A_CORE
Patient seen in RR with  at bedside. Resting with complaints of still having numbness 2/2 spinal anesthesia.  No Chest Pain, SOB, N/V.    T(C): 36.4 (04-11-19 @ 20:15), Max: 36.7 (04-11-19 @ 12:06)  HR: 64 (04-11-19 @ 20:15) (56 - 69)  BP: 125/65 (04-11-19 @ 20:15) (101/63 - 137/76)  RR: 16 (04-11-19 @ 20:15) (14 - 18)  SpO2: 99% (04-11-19 @ 20:15) (96% - 99%)  Wt(kg): --    Exam:  Alert and [place], No Acute Distress  Card: +S1/S2, RRR  Pulm: CTAB  Laterality: R hip aquacel c/d/i  Calves soft, non-tender bilaterally  Unable to assess nv status 2/2 spinal anesthesia  + DP pulse    Xray: Post-op xray in chart             A/P: Patient is a 73y Female S/p R KALPANA Anterior Approach. VSS. NAD  -PT/OT-WBAT RLE  -IS encouraged  -DVT PPx- Aspirin 325 mg BID  -Pain Control PRN  -OOB to chair  -FU AM Labs  -Dispo planning- most likely home    Savannah Haley PA-C  Team Pager #[phone]

## 2019-04-11 NOTE — PRE-ANESTHESIA EVALUATION ADULT - NSANTHOSAYNRD_GEN_A_CORE
No. MEHDI screening performed.  STOP BANG Legend: 0-2 = LOW Risk; 3-4 = INTERMEDIATE Risk; 5-8 = HIGH Risk

## 2019-04-12 ENCOUNTER — TRANSCRIPTION ENCOUNTER (OUTPATIENT)
Age: 74
End: 2019-04-12

## 2019-04-12 VITALS
DIASTOLIC BLOOD PRESSURE: 59 MMHG | OXYGEN SATURATION: 97 % | HEART RATE: 71 BPM | TEMPERATURE: 98 F | RESPIRATION RATE: 18 BRPM | SYSTOLIC BLOOD PRESSURE: 98 MMHG

## 2019-04-12 LAB
ANION GAP SERPL CALC-SCNC: 11 MMOL/L — SIGNIFICANT CHANGE UP (ref 5–17)
BUN SERPL-MCNC: 15 MG/DL — SIGNIFICANT CHANGE UP (ref 7–23)
CALCIUM SERPL-MCNC: 8.9 MG/DL — SIGNIFICANT CHANGE UP (ref 8.4–10.5)
CHLORIDE SERPL-SCNC: 106 MMOL/L — SIGNIFICANT CHANGE UP (ref 96–108)
CO2 SERPL-SCNC: 24 MMOL/L — SIGNIFICANT CHANGE UP (ref 22–31)
CREAT SERPL-MCNC: 0.58 MG/DL — SIGNIFICANT CHANGE UP (ref 0.5–1.3)
GLUCOSE SERPL-MCNC: 124 MG/DL — HIGH (ref 70–99)
HCT VFR BLD CALC: 34.6 % — SIGNIFICANT CHANGE UP (ref 34.5–45)
HGB BLD-MCNC: 11.4 G/DL — LOW (ref 11.5–15.5)
MCHC RBC-ENTMCNC: 30.6 PG — SIGNIFICANT CHANGE UP (ref 27–34)
MCHC RBC-ENTMCNC: 32.9 GM/DL — SIGNIFICANT CHANGE UP (ref 32–36)
MCV RBC AUTO: 92.8 FL — SIGNIFICANT CHANGE UP (ref 80–100)
PLATELET # BLD AUTO: 143 K/UL — LOW (ref 150–400)
POTASSIUM SERPL-MCNC: 4.2 MMOL/L — SIGNIFICANT CHANGE UP (ref 3.5–5.3)
POTASSIUM SERPL-SCNC: 4.2 MMOL/L — SIGNIFICANT CHANGE UP (ref 3.5–5.3)
RBC # BLD: 3.73 M/UL — LOW (ref 3.8–5.2)
RBC # FLD: 12.6 % — SIGNIFICANT CHANGE UP (ref 10.3–14.5)
SODIUM SERPL-SCNC: 141 MMOL/L — SIGNIFICANT CHANGE UP (ref 135–145)
WBC # BLD: 8.94 K/UL — SIGNIFICANT CHANGE UP (ref 3.8–10.5)
WBC # FLD AUTO: 8.94 K/UL — SIGNIFICANT CHANGE UP (ref 3.8–10.5)

## 2019-04-12 PROCEDURE — 85027 COMPLETE CBC AUTOMATED: CPT

## 2019-04-12 PROCEDURE — 97162 PT EVAL MOD COMPLEX 30 MIN: CPT

## 2019-04-12 PROCEDURE — 80048 BASIC METABOLIC PNL TOTAL CA: CPT

## 2019-04-12 PROCEDURE — 97165 OT EVAL LOW COMPLEX 30 MIN: CPT

## 2019-04-12 PROCEDURE — C1713: CPT

## 2019-04-12 PROCEDURE — C1776: CPT

## 2019-04-12 PROCEDURE — 82962 GLUCOSE BLOOD TEST: CPT

## 2019-04-12 PROCEDURE — 72170 X-RAY EXAM OF PELVIS: CPT

## 2019-04-12 RX ORDER — OXYCODONE HYDROCHLORIDE 5 MG/1
1 TABLET ORAL
Qty: 50 | Refills: 0
Start: 2019-04-12

## 2019-04-12 RX ORDER — DICLOFENAC SODIUM 75 MG/1
1 TABLET, DELAYED RELEASE ORAL
Qty: 0 | Refills: 0 | COMMUNITY

## 2019-04-12 RX ORDER — ASPIRIN/CALCIUM CARB/MAGNESIUM 324 MG
1 TABLET ORAL
Qty: 60 | Refills: 0
Start: 2019-04-12 | End: 2019-05-11

## 2019-04-12 RX ORDER — ACETAMINOPHEN 500 MG
3 TABLET ORAL
Qty: 0 | Refills: 0 | DISCHARGE
Start: 2019-04-12

## 2019-04-12 RX ORDER — PANTOPRAZOLE SODIUM 20 MG/1
1 TABLET, DELAYED RELEASE ORAL
Qty: 30 | Refills: 0
Start: 2019-04-12 | End: 2019-05-11

## 2019-04-12 RX ORDER — DOCUSATE SODIUM 100 MG
1 CAPSULE ORAL
Qty: 0 | Refills: 0 | DISCHARGE
Start: 2019-04-12

## 2019-04-12 RX ORDER — TRAMADOL HYDROCHLORIDE 50 MG/1
1 TABLET ORAL
Qty: 28 | Refills: 0
Start: 2019-04-12 | End: 2019-04-18

## 2019-04-12 RX ORDER — POLYETHYLENE GLYCOL 3350 17 G/17G
17 POWDER, FOR SOLUTION ORAL
Qty: 0 | Refills: 0 | DISCHARGE
Start: 2019-04-12

## 2019-04-12 RX ADMIN — Medication 100 MILLIGRAM(S): at 06:27

## 2019-04-12 RX ADMIN — SUMATRIPTAN SUCCINATE 50 MILLIGRAM(S): 4 INJECTION, SOLUTION SUBCUTANEOUS at 13:38

## 2019-04-12 RX ADMIN — TRAMADOL HYDROCHLORIDE 50 MILLIGRAM(S): 50 TABLET ORAL at 13:08

## 2019-04-12 RX ADMIN — TRAMADOL HYDROCHLORIDE 50 MILLIGRAM(S): 50 TABLET ORAL at 13:38

## 2019-04-12 RX ADMIN — Medication 500 MILLIGRAM(S): at 05:46

## 2019-04-12 RX ADMIN — Medication 1000 MILLIGRAM(S): at 02:03

## 2019-04-12 RX ADMIN — Medication 1 TABLET(S): at 13:08

## 2019-04-12 RX ADMIN — Medication 15 MILLIGRAM(S): at 13:08

## 2019-04-12 RX ADMIN — Medication 100 MILLIGRAM(S): at 13:09

## 2019-04-12 RX ADMIN — Medication 101.6 MILLIGRAM(S): at 05:46

## 2019-04-12 RX ADMIN — Medication 400 MILLIGRAM(S): at 10:06

## 2019-04-12 RX ADMIN — Medication 100 MILLIGRAM(S): at 05:46

## 2019-04-12 RX ADMIN — SODIUM CHLORIDE 1000 MILLILITER(S): 9 INJECTION INTRAMUSCULAR; INTRAVENOUS; SUBCUTANEOUS at 07:01

## 2019-04-12 RX ADMIN — SUMATRIPTAN SUCCINATE 50 MILLIGRAM(S): 4 INJECTION, SOLUTION SUBCUTANEOUS at 13:08

## 2019-04-12 RX ADMIN — Medication 400 MILLIGRAM(S): at 01:32

## 2019-04-12 RX ADMIN — Medication 1 MILLIGRAM(S): at 13:08

## 2019-04-12 RX ADMIN — PANTOPRAZOLE SODIUM 40 MILLIGRAM(S): 20 TABLET, DELAYED RELEASE ORAL at 05:46

## 2019-04-12 RX ADMIN — Medication 15 MILLIGRAM(S): at 13:23

## 2019-04-12 RX ADMIN — Medication 325 MILLIGRAM(S): at 05:46

## 2019-04-12 RX ADMIN — Medication 15 MILLIGRAM(S): at 05:46

## 2019-04-12 RX ADMIN — Medication 15 MILLIGRAM(S): at 06:16

## 2019-04-12 RX ADMIN — Medication 1000 MILLIGRAM(S): at 10:16

## 2019-04-12 NOTE — DISCHARGE NOTE PROVIDER - HOSPITAL COURSE
History of Present Illness        74 y/o female with no significant PMH has been experiencing right hip pain for x3 years. The pain radiates to the groin and thigh into the knee, which is severe in intensity, diagnosed  with right hip osteoarthritis. The pain substantially limits activities of daily living. Walking tolerance is reduced. She has been  taking diclofenac which helps moderately.  She had a recent intra-articular cortisone injection, this did not help to improve the pain. Physical therapy has been tried in the past which has not helped and in fact worsened the pain. She does not use a cane or walker. She has difficulty sleeping at night. She has difficulty rising from a chair and walking up and down stairs secondary to the pain. Now the pain does radiate to the foot but it is not associated with numbness or tingling or. She does have low back pain. No bowel or bladder incontinence. Seen & evaluated by Dr Robles & now recommends for Right Total Hip replacement on 4/11/19.            Allergies/Medications:     Allergies:          Allergies:    	No Known Drug Allergies:     	Metal allergies: Miscellaneous, Rash, Metal allergies        Home Medications:     * Patient Currently Takes Medications as of 01-Apr-2019 10:17 documented in Structured Notes    · 	diclofenac sodium 75 mg oral delayed release tablet: Last Dose Taken:  , 1 tab(s) orally 2 times a day    · 	Multiple Vitamins oral tablet: Last Dose Taken:  , 1 tab(s) orally once a day    · 	Imitrex 100 mg oral tablet: Last Dose Taken:  , 0.5 tab(s) orally once a day, As Needed        PMH/PSH/FH/SH:      Past Medical, Past Surgical, and Family History:    PAST MEDICAL HISTORY:    OA (osteoarthritis).         PAST SURGICAL HISTORY:    H/O unilateral oophorectomy.    4/11/19- Patient presents to the hospital for elective surgery, underwent a right total hip replacement- tolerated procedure without complications.    Patient was evaluated by Physical and Occupational therapy whom recommended home for discharge plan.    Patient is stable for discharge when cleared by PT.

## 2019-04-12 NOTE — OCCUPATIONAL THERAPY INITIAL EVALUATION ADULT - ADDITIONAL COMMENTS
Xray Pelvis: Postsurgical changes status post right total hip arthroplasty. No acute periprosthetic fracture. Postsurgical changes in the surrounding soft tissues with gas. No unexpected radiopaque foreign bodies are seen.

## 2019-04-12 NOTE — OCCUPATIONAL THERAPY INITIAL EVALUATION ADULT - PRECAUTIONS/LIMITATIONS, REHAB EVAL
right hip precautions/PT has been tried in the past which has not helped and in fact worsened the pain. She does not use a cane or walker. She has difficulty sleeping at night. She has difficulty rising from a chair and walking up and down stairs secondary to the pain. Now the pain does radiate to the foot but it is not associated with numbness or tingling or. She does have low back pain. No bowel or bladder incontinence. Seen & evaluated by Dr Robles & now recommends for Right Total Hip replacement

## 2019-04-12 NOTE — DISCHARGE NOTE NURSING/CASE MANAGEMENT/SOCIAL WORK - NSDCDPATPORTLINK_GEN_ALL_CORE
You can access the [facility] Patient Portal, offered by [facility], by registering with the following website: [email or web address]

## 2019-04-12 NOTE — OCCUPATIONAL THERAPY INITIAL EVALUATION ADULT - PERTINENT HX OF CURRENT PROBLEM, REHAB EVAL
72 yo F experiencing R hip pain for x3 years. The pain radiates to the groin and thigh into the knee, which is severe in intensity, diagnosed  with R hip osteoarthritis. The pain substantially limits ADLs. Walking tolerance is reduced. She has been  taking diclofenac which helps moderately.  She had a recent intra-articular cortisone injection, this did not help to improve the pain. See below

## 2019-04-12 NOTE — DISCHARGE NOTE PROVIDER - CARE PROVIDER_API CALL
Zachary Robles)  Orthopedics  [address], [address]  [place] [address]  Phone: [phone]  Fax: [phone]  Follow Up Time:

## 2019-04-12 NOTE — DISCHARGE NOTE PROVIDER - NSDCACTIVITY_GEN_ALL_CORE
Walking - Outdoors allowed/Do not drive or operate machinery/Do not make important decisions/No heavy lifting/straining/Walking - Indoors allowed/Stairs allowed

## 2019-04-12 NOTE — PROGRESS NOTE ADULT - SUBJECTIVE AND OBJECTIVE BOX
Pt seen and examined.   Doing well. Has been OOB and ambulating. Pain well controlled. No acute events overnight.     Vital Signs Last 24 Hrs  T(C): 36.5 (12 Apr 2019 04:34), Max: 36.7 (11 Apr 2019 12:06)  T(F): 97.7 (12 Apr 2019 04:34), Max: 98.1 (11 Apr 2019 12:06)  HR: 65 (12 Apr 2019 04:34) (56 - 82)  BP: 103/61 (12 Apr 2019 04:34) (101/63 - 137/76)  BP(mean): 80 (11 Apr 2019 19:30) (76 - 101)  RR: 18 (12 Apr 2019 04:34) (14 - 18)  SpO2: 98% (12 Apr 2019 04:34) (96% - 99%)    PE:  NAD  RLE hip Aquacel dressing C/D/I  Compartments soft and compressible  EHL/FHL/TA/GS intact  SILT SP/DP/T/S/S  WWP brisk cap refill  Neg calf TTP, neg Bryce's    73yFemale s/p R KALPANA  - WBAT RLE  - Anterior hip precautions  - PT/OOB  - IS  - Pain control  - DVT ppx  - Dispo planning

## 2019-04-12 NOTE — PROGRESS NOTE ADULT - ATTENDING COMMENTS
I agree with the above note and have personally seen and examined this patient. All pertinent films have been reviewed. Please refer to clinical documentation of the history, physical examinations, data summary, and both assessment and plan as documented above and with which I agree.    dc home today    Zachary Robles MD  Attending Orthopedic Surgeon

## 2019-04-12 NOTE — DISCHARGE NOTE PROVIDER - NSDCFUADDAPPT_GEN_ALL_CORE_FT
Call Dr. Robles's office to schedule a follow up appointment about 4 weeks after surgery.   Recommend follow up with medical MD, within next 4 weeks.

## 2019-04-12 NOTE — DISCHARGE NOTE PROVIDER - NSDCFUADDINST_GEN_ALL_CORE_FT
Dressing to be removed per date on dressing. Weight bearing as tolerated- Physical therapy to assist with exercise and help increase endurance.  Follow Dr. Robles's instruction sheet

## 2019-04-12 NOTE — OCCUPATIONAL THERAPY INITIAL EVALUATION ADULT - LIVES WITH, PROFILE
Pt lives in private home with , 10 steps to enter, walk in shower. Pt I in ADLs and ambulation prior to admission

## 2019-04-15 ENCOUNTER — INBOUND DOCUMENT (OUTPATIENT)
Age: 74
End: 2019-04-15

## 2019-04-16 ENCOUNTER — CLINICAL ADVICE (OUTPATIENT)
Age: 74
End: 2019-04-16

## 2019-05-01 ENCOUNTER — APPOINTMENT (OUTPATIENT)
Dept: PHYSICAL MEDICINE AND REHAB | Facility: CLINIC | Age: 74
End: 2019-05-01

## 2019-05-08 ENCOUNTER — APPOINTMENT (OUTPATIENT)
Age: 74
End: 2019-05-08
Payer: MEDICARE

## 2019-05-08 PROCEDURE — 99024 POSTOP FOLLOW-UP VISIT: CPT

## 2019-05-08 PROCEDURE — 73502 X-RAY EXAM HIP UNI 2-3 VIEWS: CPT | Mod: RT

## 2019-05-08 RX ORDER — DICLOFENAC SODIUM 75 MG/1
75 TABLET, DELAYED RELEASE ORAL
Qty: 60 | Refills: 3 | Status: ACTIVE | COMMUNITY
Start: 2018-08-21 | End: 1900-01-01

## 2019-05-08 NOTE — DISCUSSION/SUMMARY
[de-identified] : 1 month status post right total hip arthroplasty doing very well.  Written infectious precautions were reviewed. The patient will progress to a cane at this time. Aspirin therapy will be discontinued for the purpose of orthopedic thromboembolism prophylaxis. Return in four to five months for follow-up evaluation.

## 2019-05-08 NOTE — HISTORY OF PRESENT ILLNESS
[de-identified] : Status-post right total hip arthroplasty here for initial 1 month postoperative evaluation. Excellent progress is noted in terms of pain and restoration of function. Pain is well controlled with oral medications. There has been no change in medical health since discharge. The patient does not require assistive devices.  She is thrilled with her progress thus far.

## 2019-05-08 NOTE — PHYSICAL EXAM
[de-identified] : Examination reveals satisfactory wound healing. No surrounding erythema. Motion is good and relatively pain free. Leg lengths are acceptable. [de-identified] : AP pelvis, AP hip, and lateral x-rays of the right hip were reviewed. Satisfactory position and alignment of the components are present. No signs of loosening are seen.

## 2019-05-17 ENCOUNTER — APPOINTMENT (OUTPATIENT)
Dept: ORTHOPEDIC SURGERY | Facility: CLINIC | Age: 74
End: 2019-05-17
Payer: MEDICARE

## 2019-05-17 VITALS
HEART RATE: 62 BPM | BODY MASS INDEX: 20.89 KG/M2 | WEIGHT: 130 LBS | HEIGHT: 66 IN | DIASTOLIC BLOOD PRESSURE: 73 MMHG | SYSTOLIC BLOOD PRESSURE: 117 MMHG

## 2019-05-17 PROCEDURE — 20610 DRAIN/INJ JOINT/BURSA W/O US: CPT

## 2019-05-17 PROCEDURE — 99214 OFFICE O/P EST MOD 30 MIN: CPT | Mod: 25

## 2019-05-17 PROCEDURE — 73030 X-RAY EXAM OF SHOULDER: CPT

## 2019-06-12 ENCOUNTER — APPOINTMENT (OUTPATIENT)
Dept: ORTHOPEDIC SURGERY | Facility: CLINIC | Age: 74
End: 2019-06-12
Payer: MEDICARE

## 2019-06-12 PROCEDURE — 99024 POSTOP FOLLOW-UP VISIT: CPT

## 2019-06-12 RX ORDER — SUMATRIPTAN SUCCINATE 6 MG/.5ML
6 INJECTION SUBCUTANEOUS
Qty: 2 | Refills: 0 | Status: ACTIVE | COMMUNITY
Start: 2018-03-07

## 2019-06-12 NOTE — DISCUSSION/SUMMARY
[de-identified] : To month status post right total hip arthroplasty doing very well.  She will continue to be weightbearing as tolerated without restriction.  Advised to avoid activities that are flaring her pain.  Advised that she should give it more time to heal.  All this could represent iliopsoas impingement or rectus irritation it is too early to tell when to give it more time to heal from her surgery.  She will follow-up with me at the 6-month anniversary from her total hip arthroplasty.

## 2019-06-12 NOTE — HISTORY OF PRESENT ILLNESS
[de-identified] : Status-post right total hip arthroplasty here for 2 month postoperative evaluation. Excellent progress is noted in terms of pain and restoration of function.  In fact she is return to full function and walks without an assistive device.  She does complain of some mild groin pain with high hip flexion, high straight leg raises and at the extremes of motion.  Otherwise she is back to all activities of daily living and she is thrilled with her outcome thus far.  He feels like she still has 5% recovery to go.  She is not taking any medications for pain.  She does admit to pushing it a little bit too much.  The patient denies any radiation of the pain to the feet and it is not associated with numbness, tingling, or weakness.

## 2019-06-12 NOTE — PHYSICAL EXAM
[de-identified] : Patient is well nourished, well-developed, in no acute distress, with appropriate mood and affect. The patient is oriented to time, place, and person. Respirations are even and unlabored. Gait evaluation does not reveal a limp. There is no inguinal adenopathy. Examination of the contralateral hip shows normal range of motion, strength, no tenderness, and intact skin. The affected limb is well-perfused and showed 2+ dp/pt pulses, without skin lesions, shows a grossly normal motor and sensory examination. Examination of the hip shows a well-healed anteriorly based surgical scar. Hip motion is full and painless from 0-90 degrees extension to flexion, 20 degrees adduction and 20 degrees abduction, and 15 degrees internal and 30 degrees external rotation. Leg lengths are approximately equal. FADIR is negative and SABRINA is negative. Stinchfield test is negative. Both hips are stable and muscle strength is normal with good strength with resisted abduction and adduction. Pedal pulses are palpable.  She does have some weakness with straight leg raise and high hip flexion.

## 2019-06-26 ENCOUNTER — CLINICAL ADVICE (OUTPATIENT)
Age: 74
End: 2019-06-26

## 2019-07-10 ENCOUNTER — FORM ENCOUNTER (OUTPATIENT)
Age: 74
End: 2019-07-10

## 2019-07-22 ENCOUNTER — RX RENEWAL (OUTPATIENT)
Age: 74
End: 2019-07-22

## 2019-08-18 ENCOUNTER — FORM ENCOUNTER (OUTPATIENT)
Age: 74
End: 2019-08-18

## 2019-08-19 ENCOUNTER — APPOINTMENT (OUTPATIENT)
Dept: ORTHOPEDIC SURGERY | Facility: CLINIC | Age: 74
End: 2019-08-19
Payer: MEDICARE

## 2019-08-19 ENCOUNTER — OUTPATIENT (OUTPATIENT)
Dept: OUTPATIENT SERVICES | Facility: HOSPITAL | Age: 74
LOS: 1 days | End: 2019-08-19
Payer: MEDICARE

## 2019-08-19 ENCOUNTER — APPOINTMENT (OUTPATIENT)
Dept: CT IMAGING | Facility: CLINIC | Age: 74
End: 2019-08-19
Payer: MEDICARE

## 2019-08-19 VITALS — HEIGHT: 66 IN | WEIGHT: 133 LBS | BODY MASS INDEX: 21.38 KG/M2

## 2019-08-19 DIAGNOSIS — Z96.641 PRESENCE OF RIGHT ARTIFICIAL HIP JOINT: ICD-10-CM

## 2019-08-19 DIAGNOSIS — T84.84XA PAIN DUE TO INTERNAL ORTHOPEDIC PROSTHETIC DEVICES, IMPLANTS AND GRAFTS, INITIAL ENCOUNTER: ICD-10-CM

## 2019-08-19 DIAGNOSIS — Z90.721 ACQUIRED ABSENCE OF OVARIES, UNILATERAL: Chronic | ICD-10-CM

## 2019-08-19 PROCEDURE — 73700 CT LOWER EXTREMITY W/O DYE: CPT

## 2019-08-19 PROCEDURE — 76376 3D RENDER W/INTRP POSTPROCES: CPT | Mod: 26

## 2019-08-19 PROCEDURE — 76376 3D RENDER W/INTRP POSTPROCES: CPT

## 2019-08-19 PROCEDURE — 73502 X-RAY EXAM HIP UNI 2-3 VIEWS: CPT | Mod: RT

## 2019-08-19 PROCEDURE — 73700 CT LOWER EXTREMITY W/O DYE: CPT | Mod: 26,RT

## 2019-08-19 PROCEDURE — 99213 OFFICE O/P EST LOW 20 MIN: CPT

## 2019-08-19 NOTE — HISTORY OF PRESENT ILLNESS
[de-identified] : Pt is a 73 yo F s/p R KALPANA 4/11/19 here for follow up.  She states that she is still having anterior hip pain that radiates into the groin and difficulty with flexing her hip enough to put on her pants or get in and out of a car.  She is taking Diclofenac for shoulder pain and she does not think it helps this pain.  She restarted doing PT 3x/w, but she fears it might be making it worse.  Denies fever/chills.  She is able to bike and ambulate without issues.  She is also ambulating without an assistive device.  She is doing all of the other activities of daily living that she desires.

## 2019-08-19 NOTE — DISCUSSION/SUMMARY
[de-identified] : For month status post right total hip arthroplasty.  Overall she is recovered and ambulating without even a limp.  She does have some limitations in high hip flexion past 90 degrees and straight leg raises.  She may have some component of iliopsoas tendinitis.  I would like to obtain a CT scan of the right hip and pelvis to evaluate for iliopsoas impingement secondary to its proximity to the acetabular cup.  If this does show some area where the tendons may be adjacent to the acetabular cup then she may be a candidate for diagnostic and therapeutic injection of the iliopsoas bursa.  I recommended stopping physical therapy at this point.  She will continue with the NSAIDs.  She will notify me after the CT scan is complete so that we we can review imaging over the telephone and then plan next steps.

## 2019-08-19 NOTE — PHYSICAL EXAM
[de-identified] : Patient is well nourished, well-developed, in no acute distress, with appropriate mood and affect. The patient is oriented to time, place, and person. Respirations are even and unlabored. Gait evaluation does not reveal a limp. There is no inguinal adenopathy. Examination of the contralateral hip shows normal range of motion, strength, no tenderness, and intact skin. The affected limb is well-perfused and showed 2+ dp/pt pulses, without skin lesions, shows a grossly normal motor and sensory examination. Examination of the hip shows a well-healed anteriorly based surgical scar. Hip motion is full and painless from 0-90 degrees extension to flexion, 20 degrees adduction and 20 degrees abduction, and 15 degrees internal and 30 degrees external rotation. Leg lengths are approximately equal. FADIR is negative and SABRINA is negative. Stinchfield test is positive.  I can passively flex her to approximately 140 degrees and this is painless.  Resisted hip extension is painful.  She is able to demonstrate flexion to approximately 45 degrees while lying supine.  At that point she becomes limited by pain.  Standing single leg stance she is able to passively and actively flex her hip to approximately 90 degrees but not higher limited by reproduction of pain..  Both hips are stable and muscle strength is normal with good strength with resisted abduction and adduction. Pedal pulses are palpable.   [de-identified] : AP pelvis, AP hip, and lateral x-rays of the right hip were reviewed. Satisfactory position and alignment of the components are present. No signs of loosening are seen.

## 2019-08-20 ENCOUNTER — CLINICAL ADVICE (OUTPATIENT)
Age: 74
End: 2019-08-20

## 2019-08-22 ENCOUNTER — FORM ENCOUNTER (OUTPATIENT)
Age: 74
End: 2019-08-22

## 2019-08-23 ENCOUNTER — OUTPATIENT (OUTPATIENT)
Dept: OUTPATIENT SERVICES | Facility: HOSPITAL | Age: 74
LOS: 1 days | End: 2019-08-23
Payer: MEDICARE

## 2019-08-23 ENCOUNTER — APPOINTMENT (OUTPATIENT)
Dept: ULTRASOUND IMAGING | Facility: CLINIC | Age: 74
End: 2019-08-23
Payer: MEDICARE

## 2019-08-23 DIAGNOSIS — Z90.721 ACQUIRED ABSENCE OF OVARIES, UNILATERAL: Chronic | ICD-10-CM

## 2019-08-23 DIAGNOSIS — T84.84XA PAIN DUE TO INTERNAL ORTHOPEDIC PROSTHETIC DEVICES, IMPLANTS AND GRAFTS, INITIAL ENCOUNTER: ICD-10-CM

## 2019-08-23 PROCEDURE — 20611 DRAIN/INJ JOINT/BURSA W/US: CPT

## 2019-08-23 PROCEDURE — 20611 DRAIN/INJ JOINT/BURSA W/US: CPT | Mod: RT

## 2019-08-26 ENCOUNTER — CLINICAL ADVICE (OUTPATIENT)
Age: 74
End: 2019-08-26

## 2019-10-22 ENCOUNTER — CLINICAL ADVICE (OUTPATIENT)
Age: 74
End: 2019-10-22

## 2019-10-28 ENCOUNTER — APPOINTMENT (OUTPATIENT)
Dept: ORTHOPEDIC SURGERY | Facility: CLINIC | Age: 74
End: 2019-10-28
Payer: MEDICARE

## 2019-10-28 LAB — CRP SERPL-MCNC: <0.1 MG/DL

## 2019-10-28 PROCEDURE — 99214 OFFICE O/P EST MOD 30 MIN: CPT

## 2019-10-28 PROCEDURE — 73502 X-RAY EXAM HIP UNI 2-3 VIEWS: CPT | Mod: RT

## 2019-10-28 NOTE — REASON FOR VISIT
[Follow-Up Visit] : a follow-up visit for [Hip Pain] : hip pain [Artificial Hip Joint] : artificial hip joint

## 2019-10-28 NOTE — DISCUSSION/SUMMARY
[de-identified] : 6 months status post right total hip arthroplasty.  She continues to complain of pain with extreme range of motion about her hip but this may likely be explained by the acetabular augmentation screw that is prominent inside her pelvis.  I had a long discussion with the patient today as well as over the phone previously regarding the surgical options or nonsurgical options for this.  She would like to proceed with removal of the screw and head and liner exchange and possible revision total hip arthroplasty depending on intraoperative findings.  Also will send for ESR/CRP.\par \par The patient is an appropriate candidate for consideration of right revision total hip arthroplasty. This recommendation is based on the patient's pain, function, and bone stock. An extensive discussion was conducted of the natural history of this particular problem and the variety of surgical and non-surgical treatment options available to the patient. A risk/benefit analysis was discussed with the patient reviewing the advantages and disadvantages of surgical intervention at this time. A full explanation was given of the nature and the purpose of the procedure and anesthesia, its benefits, possible alternative methods of diagnosis or treatment, the risks involved, the possibility of complications, the foreseeable consequences of the procedure and the possible results of the non-treatment. I reviewed the plan of care as well as used a model of a revision joint replacement implant equivalent to the one that will be used for their total revision joint replacement. The ability to secure the implant utilizing cement or cementless (press-fit) was discussed with the patient. The patient agrees with the plan of care, as well as the use of implants for their revision joint replacement. \par \par No guarantee or assurance was made as to the results that may be obtained. Specifically, the risks were identified to include, but are not limited to the following: Infection, phlebitis, pulmonary embolism, death, paralysis, dislocation, pain, stiffness, instability, limp, weakness, breakage, leg-length inequality, uncontrolled bleeding, nerve injury, blood vessel injury, pressure sores, anesthetic risks, delayed healing of wound and bone, and wear and loosening. Further discussion was undertaken with the patient about the details of surgical preparation, treatment, and postoperative rehabilitation including medical clearance, autotransfusion, the hospital course, and the postoperative rehabilitation involved. Reimplantation may require cemented or cementless components, or both, depending upon a variety of factors that must be assessed at the time of surgery. The need for bone graft (either autograft or allograft) to enhance the chance for success of the procedure(s) was discussed. Furthermore, the patient was advised that banked homologous blood transfusion may be required. All in all, I feel that this patient is a good candidate for surgical reconstruction.

## 2019-10-28 NOTE — HISTORY OF PRESENT ILLNESS
[de-identified] : Pt is a very nice 73 yo F s/p R KALPANA 4/11/19 here for re-evaluation of Rt hip pain.  She is approximately 6-month status post right total hip arthroplasty.  She is continued to complain of pain with high hip flexion more than 120 degrees or when crossing her legs are getting into and out of a car.  She notes clicking in her leg when she high flexes the hip.  She has difficulty with straight leg raise.  She was last seen 8/19/19, since then she has had a cortisone inj in the iliopsoas which helped, however she feels it is wearing off.  She states that she is still having anterior hip pain that radiates into the groin and difficulty with flexing her hip enough to put on her pants or get in and out of a car.  She is taking Diclofenac for shoulder pain and she does not think it helps this pain.  She is also ambulating without an assistive device.  She is doing all of the other activities of daily living that she desires.  She would like to discuss her surgical options.  She has tried physical therapy.  She does not use a cane or walker.

## 2019-10-28 NOTE — PHYSICAL EXAM
[de-identified] : Patient is well nourished, well-developed, in no acute distress, with appropriate mood and affect. The patient is oriented to time, place, and person. Respirations are even and unlabored. Gait evaluation does not reveal a limp. There is no inguinal adenopathy. Examination of the contralateral hip shows normal range of motion, strength, no tenderness, and intact skin. The affected limb is well-perfused and showed 2+ dp/pt pulses, without skin lesions, shows a grossly normal motor and sensory examination. Examination of the hip shows a well-healed anteriorly based surgical scar. Hip motion is full and painless from 0-90 degrees extension to flexion, 20 degrees adduction and 20 degrees abduction, and 15 degrees internal and 30 degrees external rotation. Leg lengths are approximately equal. FADIR is negative and SABRINA is negative. Stinchfield test is positive.  I can passively flex her to approximately 160 degrees and this is painless.  Resisted hip extension is painful.  She is able to demonstrate flexion to approximately 60 degrees while lying supine and when sitting up she can actively flex her hip to approximately 130 degrees.  At that point she becomes limited by pain.  Standing single leg stance she is able to passively and actively flex her hip to approximately 115 degrees but not higher limited by reproduction of pain..  Both hips are stable and muscle strength is normal with good strength with resisted abduction and adduction. Pedal pulses are palpable.   [de-identified] : AP pelvis, AP hip, and lateral x-rays of the right hip were reviewed. Satisfactory position and alignment of the components are present. No signs of loosening are seen.  2 acetabular augmentation screws are noted.\par \par I reviewed the CT scan that I previously ordered for her of her right hip which demonstrate status post right total hip arthroplasty with a prominent acetabular augmentation screw contacting the iliopsoas muscle/tendon.

## 2019-10-29 ENCOUNTER — RESULT REVIEW (OUTPATIENT)
Age: 74
End: 2019-10-29

## 2019-10-29 LAB — ERYTHROCYTE [SEDIMENTATION RATE] IN BLOOD BY WESTERGREN METHOD: 2 MM/HR

## 2019-12-02 ENCOUNTER — OUTPATIENT (OUTPATIENT)
Dept: OUTPATIENT SERVICES | Facility: HOSPITAL | Age: 74
LOS: 1 days | End: 2019-12-02
Payer: MEDICARE

## 2019-12-02 VITALS
HEART RATE: 55 BPM | RESPIRATION RATE: 16 BRPM | OXYGEN SATURATION: 99 % | WEIGHT: 130.95 LBS | TEMPERATURE: 98 F | DIASTOLIC BLOOD PRESSURE: 79 MMHG | SYSTOLIC BLOOD PRESSURE: 127 MMHG

## 2019-12-02 DIAGNOSIS — Z90.721 ACQUIRED ABSENCE OF OVARIES, UNILATERAL: Chronic | ICD-10-CM

## 2019-12-02 DIAGNOSIS — Z01.818 ENCOUNTER FOR OTHER PREPROCEDURAL EXAMINATION: ICD-10-CM

## 2019-12-02 DIAGNOSIS — T85.848A PAIN DUE TO OTHER INTERNAL PROSTHETIC DEVICES, IMPLANTS AND GRAFTS, INITIAL ENCOUNTER: ICD-10-CM

## 2019-12-02 DIAGNOSIS — Z96.641 PRESENCE OF RIGHT ARTIFICIAL HIP JOINT: Chronic | ICD-10-CM

## 2019-12-02 DIAGNOSIS — T84.84XA PAIN DUE TO INTERNAL ORTHOPEDIC PROSTHETIC DEVICES, IMPLANTS AND GRAFTS, INITIAL ENCOUNTER: ICD-10-CM

## 2019-12-02 DIAGNOSIS — Z90.722 ACQUIRED ABSENCE OF OVARIES, BILATERAL: Chronic | ICD-10-CM

## 2019-12-02 LAB
ALBUMIN SERPL ELPH-MCNC: 4.4 G/DL — SIGNIFICANT CHANGE UP (ref 3.3–5)
ALP SERPL-CCNC: 84 U/L — SIGNIFICANT CHANGE UP (ref 40–120)
ALT FLD-CCNC: 32 U/L — SIGNIFICANT CHANGE UP (ref 10–45)
ANION GAP SERPL CALC-SCNC: 12 MMOL/L — SIGNIFICANT CHANGE UP (ref 5–17)
AST SERPL-CCNC: 26 U/L — SIGNIFICANT CHANGE UP (ref 10–40)
BILIRUB SERPL-MCNC: 0.5 MG/DL — SIGNIFICANT CHANGE UP (ref 0.2–1.2)
BLD GP AB SCN SERPL QL: NEGATIVE — SIGNIFICANT CHANGE UP
BUN SERPL-MCNC: 15 MG/DL — SIGNIFICANT CHANGE UP (ref 7–23)
CALCIUM SERPL-MCNC: 9.7 MG/DL — SIGNIFICANT CHANGE UP (ref 8.4–10.5)
CHLORIDE SERPL-SCNC: 103 MMOL/L — SIGNIFICANT CHANGE UP (ref 96–108)
CO2 SERPL-SCNC: 26 MMOL/L — SIGNIFICANT CHANGE UP (ref 22–31)
CREAT SERPL-MCNC: 0.76 MG/DL — SIGNIFICANT CHANGE UP (ref 0.5–1.3)
GLUCOSE SERPL-MCNC: 85 MG/DL — SIGNIFICANT CHANGE UP (ref 70–99)
HBA1C BLD-MCNC: 5.1 % — SIGNIFICANT CHANGE UP (ref 4–5.6)
HCT VFR BLD CALC: 41 % — SIGNIFICANT CHANGE UP (ref 34.5–45)
HGB BLD-MCNC: 13.5 G/DL — SIGNIFICANT CHANGE UP (ref 11.5–15.5)
MCHC RBC-ENTMCNC: 31 PG — SIGNIFICANT CHANGE UP (ref 27–34)
MCHC RBC-ENTMCNC: 32.9 GM/DL — SIGNIFICANT CHANGE UP (ref 32–36)
MCV RBC AUTO: 94 FL — SIGNIFICANT CHANGE UP (ref 80–100)
MRSA PCR RESULT.: SIGNIFICANT CHANGE UP
PLATELET # BLD AUTO: 181 K/UL — SIGNIFICANT CHANGE UP (ref 150–400)
POTASSIUM SERPL-MCNC: 3.9 MMOL/L — SIGNIFICANT CHANGE UP (ref 3.5–5.3)
POTASSIUM SERPL-SCNC: 3.9 MMOL/L — SIGNIFICANT CHANGE UP (ref 3.5–5.3)
PROT SERPL-MCNC: 6.5 G/DL — SIGNIFICANT CHANGE UP (ref 6–8.3)
RBC # BLD: 4.36 M/UL — SIGNIFICANT CHANGE UP (ref 3.8–5.2)
RBC # FLD: 12.7 % — SIGNIFICANT CHANGE UP (ref 10.3–14.5)
RH IG SCN BLD-IMP: POSITIVE — SIGNIFICANT CHANGE UP
S AUREUS DNA NOSE QL NAA+PROBE: SIGNIFICANT CHANGE UP
SODIUM SERPL-SCNC: 141 MMOL/L — SIGNIFICANT CHANGE UP (ref 135–145)
WBC # BLD: 4.73 K/UL — SIGNIFICANT CHANGE UP (ref 3.8–10.5)
WBC # FLD AUTO: 4.73 K/UL — SIGNIFICANT CHANGE UP (ref 3.8–10.5)

## 2019-12-02 PROCEDURE — 87640 STAPH A DNA AMP PROBE: CPT

## 2019-12-02 PROCEDURE — 83036 HEMOGLOBIN GLYCOSYLATED A1C: CPT

## 2019-12-02 PROCEDURE — 85027 COMPLETE CBC AUTOMATED: CPT

## 2019-12-02 PROCEDURE — 80053 COMPREHEN METABOLIC PANEL: CPT

## 2019-12-02 PROCEDURE — G0463: CPT

## 2019-12-02 PROCEDURE — 86901 BLOOD TYPING SEROLOGIC RH(D): CPT

## 2019-12-02 PROCEDURE — 86850 RBC ANTIBODY SCREEN: CPT

## 2019-12-02 PROCEDURE — 86900 BLOOD TYPING SEROLOGIC ABO: CPT

## 2019-12-02 RX ORDER — CEFAZOLIN SODIUM 1 G
2000 VIAL (EA) INJECTION ONCE
Refills: 0 | Status: COMPLETED | OUTPATIENT
Start: 2019-12-19 | End: 2019-12-19

## 2019-12-02 RX ORDER — DICLOFENAC SODIUM 75 MG/1
1 TABLET, DELAYED RELEASE ORAL
Qty: 0 | Refills: 0 | DISCHARGE

## 2019-12-02 NOTE — H&P PST ADULT - ASSESSMENT
CAPRINI SCORE [CLOT]    AGE RELATED RISK FACTORS                                                       MOBILITY RELATED FACTORS  [ ] Age 41-60 years                                            (1 Point)                  [ ] Bed rest                                                        (1 Point)  [x ] Age: 61-74 years                                           (2 Points)                 [ ] Plaster cast                                                   (2 Points)  [ ] Age= 75 years                                              (3 Points)                 [ ] Bed bound for more than 72 hours                 (2 Points)    DISEASE RELATED RISK FACTORS                                               GENDER SPECIFIC FACTORS  [ ] Edema in the lower extremities                       (1 Point)                  [ ] Pregnancy                                                     (1 Point)  [ ] Varicose veins                                               (1 Point)                  [ ] Post-partum < 6 weeks                                   (1 Point)             [ ] BMI > 25 Kg/m2                                            (1 Point)                  [ ] Hormonal therapy  or oral contraception          (1 Point)                 [ ] Sepsis (in the previous month)                        (1 Point)                  [ ] History of pregnancy complications                 (1 point)  [ ] Pneumonia or serious lung disease                                               [ ] Unexplained or recurrent                     (1 Point)           (in the previous month)                               (1 Point)  [ ] Abnormal pulmonary function test                     (1 Point)                 SURGERY RELATED RISK FACTORS  [ ] Acute myocardial infarction                              (1 Point)                 [ ]  Section                                             (1 Point)  [ ] Congestive heart failure (in the previous month)  (1 Point)               [ ] Minor surgery                                                  (1 Point)   [ ] Inflammatory bowel disease                             (1 Point)                 [ ] Arthroscopic surgery                                        (2 Points)  [ ] Central venous access                                      (2 Points)                [x ] General surgery lasting more than 45 minutes   (2 Points)       [ ] Stroke (in the previous month)                          (5 Points)               [ ] Elective arthroplasty                                         (5 Points)                                                                                                                                               HEMATOLOGY RELATED FACTORS                                                 TRAUMA RELATED RISK FACTORS  [ ] Prior episodes of VTE                                     (3 Points)                 [ ] Fracture of the hip, pelvis, or leg                       (5 Points)  [ ] Positive family history for VTE                         (3 Points)                 [ ] Acute spinal cord injury (in the previous month)  (5 Points)  [ ] Prothrombin 04123 A                                     (3 Points)                 [ ] Paralysis  (less than 1 month)                             (5 Points)  [ ] Factor V Leiden                                             (3 Points)                  [ ] Multiple Trauma within 1 month                        (5 Points)  [ ] Lupus anticoagulants                                     (3 Points)                                                           [ ] Anticardiolipin antibodies                               (3 Points)                                                       [ ] High homocysteine in the blood                      (3 Points)                                             [ ] Other congenital or acquired thrombophilia      (3 Points)                                                [ ] Heparin induced thrombocytopenia                  (3 Points)                                          Total Score [   4       ] CAPRINI SCORE [CLOT]    AGE RELATED RISK FACTORS                                                       MOBILITY RELATED FACTORS  [ ] Age 41-60 years                                            (1 Point)                  [ ] Bed rest                                                        (1 Point)  [x ] Age: 61-74 years                                           (2 Points)                 [ ] Plaster cast                                                   (2 Points)  [ ] Age= 75 years                                              (3 Points)                 [ ] Bed bound for more than 72 hours                 (2 Points)    DISEASE RELATED RISK FACTORS                                               GENDER SPECIFIC FACTORS  [ ] Edema in the lower extremities                       (1 Point)                  [ ] Pregnancy                                                     (1 Point)  [ ] Varicose veins                                               (1 Point)                  [ ] Post-partum < 6 weeks                                   (1 Point)             [ ] BMI > 25 Kg/m2                                            (1 Point)                  [ ] Hormonal therapy  or oral contraception          (1 Point)                 [ ] Sepsis (in the previous month)                        (1 Point)                  [ ] History of pregnancy complications                 (1 point)  [ ] Pneumonia or serious lung disease                                               [ ] Unexplained or recurrent                     (1 Point)           (in the previous month)                               (1 Point)  [ ] Abnormal pulmonary function test                     (1 Point)                 SURGERY RELATED RISK FACTORS  [ ] Acute myocardial infarction                              (1 Point)                 [ ]  Section                                             (1 Point)  [ ] Congestive heart failure (in the previous month)  (1 Point)               [ ] Minor surgery                                                  (1 Point)   [ ] Inflammatory bowel disease                             (1 Point)                 [ ] Arthroscopic surgery                                        (2 Points)  [ ] Central venous access                                      (2 Points)                [ ] General surgery lasting more than 45 minutes   (2 Points)       [ ] Stroke (in the previous month)                          (5 Points)               [x ] Elective arthroplasty                                         (5 Points)                                                                                                                                               HEMATOLOGY RELATED FACTORS                                                 TRAUMA RELATED RISK FACTORS  [ ] Prior episodes of VTE                                     (3 Points)                 [ ] Fracture of the hip, pelvis, or leg                       (5 Points)  [ ] Positive family history for VTE                         (3 Points)                 [ ] Acute spinal cord injury (in the previous month)  (5 Points)  [ ] Prothrombin 94239 A                                     (3 Points)                 [ ] Paralysis  (less than 1 month)                             (5 Points)  [ ] Factor V Leiden                                             (3 Points)                  [ ] Multiple Trauma within 1 month                        (5 Points)  [ ] Lupus anticoagulants                                     (3 Points)                                                           [ ] Anticardiolipin antibodies                               (3 Points)                                                       [ ] High homocysteine in the blood                      (3 Points)                                             [ ] Other congenital or acquired thrombophilia      (3 Points)                                                [ ] Heparin induced thrombocytopenia                  (3 Points)                                          Total Score [   7      ]

## 2019-12-02 NOTE — H&P PST ADULT - HISTORY OF PRESENT ILLNESS
74 y/o female with no significant PMH has been experiencing right hip pain for x3 years. The pain radiates to the groin and thigh into the knee, which is severe in intensity, diagnosed  with right hip osteoarthritis. The pain substantially limits activities of daily living. Walking tolerance is reduced. She has been  taking diclofenac which helps moderately.  She had a recent intra-articular cortisone injection, this did not help to improve the pain. Physical therapy has been tried in the past which has not helped and in fact worsened the pain. She does not use a cane or walker. She has difficulty sleeping at night. She has difficulty rising from a chair and walking up and down stairs secondary to the pain. Now the pain does radiate to the foot but it is not associated with numbness or tingling or. She does have low back pain. No bowel or bladder incontinence. Seen & evaluated by Dr Robles & now recommends for Right Total Hip replacement on 4/11/19. 72 y/o female with no significant PMH has been experiencing right hip pain for x3 years. The pain radiates to the groin and thigh into the knee, which is severe in intensity, diagnosed  with right hip osteoarthritis. The pain substantially limits activities of daily living. Walking tolerance is reduced. She has been  taking diclofenac which helps moderately.  She had a recent intra-articular cortisone injection, this did not help to improve the pain. Physical therapy has been tried in the past which has not helped and in fact worsened the pain. She does not use a cane or walker. She has difficulty sleeping at night. She has difficulty rising from a chair and walking up and down stairs secondary to the pain. Now the pain does radiate to the foot but it is not associated with numbness or tingling or. She does have low back pain. No bowel or bladder incontinence. Seen & evaluated by Dr Robles & now recommends for Right Total Hip replacement on 4/11/19.      Pt states "the screw will need to be removed"  Pt rates right hip pain to be 4/10 72 y/o female with no significant PMH has been experiencing right hip pain for 3 years. The pain radiates to the groin and thigh into the knee, which is severe in intensity, diagnosed  with right hip osteoarthritis. The pain substantially limits activities of daily living. Walking tolerance is reduced. She has been  taking diclofenac which helps moderately.  She had a recent intra-articular cortisone injection, this did not help to improve the pain. Physical therapy has been tried in the past which has not helped and in fact worsened the pain. She does not use a cane or walker. She has difficulty sleeping at night. She has difficulty rising from a chair and walking up and down stairs secondary to the pain. Now the pain does radiate to the foot but it is not associated with numbness or tingling or. She does have low back pain. No bowel or bladder incontinence. Seen & evaluated by Dr Robles & now recommends for Right Total Hip replacement on 4/11/19.    ADDENDUM: Pt's history reviewed as above. As per pt, she has had pain and difficulty lifting right hip and with straight leg raises after right hip replacement in 4/2019. Pt seen by surgeon and s/p CT scan and pt states "the screw will need to be removed". Pt rates right hip pain to be 4/10 when trying to lift right hip to put clothing on and transferring positions. Pt able to ambulate without difficulty and still is able to go to gym 5 times weekly. Pt denies right leg neuralgia. Pt electing for right total hip replacement revision both components direct anterior approach on 12/19/19.

## 2019-12-02 NOTE — H&P PST ADULT - NEGATIVE RESPIRATORY AND THORAX SYMPTOMS
no wheezing/no cough/no dyspnea no dyspnea/no cough/no pleuritic chest pain/no wheezing/no hemoptysis

## 2019-12-02 NOTE — H&P PST ADULT - ACTIVITY
Goes to gym 5 times weekly, spin class Goes to gym 5 times weekly, low impact and light weights, spin class

## 2019-12-02 NOTE — H&P PST ADULT - OTHER CARE PROVIDERS
Dr Donald olmedo [phone] Dr Donald Napier [phone] Dr Donald Napier [phone] (Cardiologist)

## 2019-12-02 NOTE — H&P PST ADULT - NSICDXPASTSURGICALHX_GEN_ALL_CORE_FT
PAST SURGICAL HISTORY:  H/O unilateral oophorectomy PAST SURGICAL HISTORY:  H/O bilateral oophorectomy (2009)    S/P hip replacement, right (4/2019)

## 2019-12-02 NOTE — H&P PST ADULT - RS GEN PE MLT RESP DETAILS PC
respirations non-labored/breath sounds equal/airway patent/normal/clear to auscultation bilaterally/good air movement

## 2019-12-02 NOTE — H&P PST ADULT - NSICDXPASTMEDICALHX_GEN_ALL_CORE_FT
PAST MEDICAL HISTORY:  OA (osteoarthritis) PAST MEDICAL HISTORY:  Migraines     OA (osteoarthritis)     Pain due to other internal prosthetic device, implant, or graft

## 2019-12-02 NOTE — H&P PST ADULT - NEGATIVE CARDIOVASCULAR SYMPTOMS
no palpitations/no dyspnea on exertion/no chest pain no orthopnea/no paroxysmal nocturnal dyspnea/no chest pain/no claudication/no dyspnea on exertion/no peripheral edema/no palpitations

## 2019-12-02 NOTE — H&P PST ADULT - MUSCULOSKELETAL COMMENTS
See HPI, Left shoulder pain x 5 years, takes See HPI, Left shoulder pain x 5 years, takes Diclofenac BID mainly for right shoulder pain Right hip -  ROM, non-tender to palpation, no edema

## 2019-12-02 NOTE — H&P PST ADULT - NSICDXPROBLEM_GEN_ALL_CORE_FT
PROBLEM DIAGNOSES  Problem: OA (osteoarthritis)  Assessment and Plan: Right THR   cbc, bmp, T & S, MRSA   dental clearance in chart       R/O PROBLEM DIAGNOSES  Problem: Prophylactic measure  Assessment and Plan: The Caprini score indicates this patient is at risk for a VTE event (score 3-5).  Most surgical patients in this group would benefit from pharmacologic prophylaxis.  The surgical team will determine the balance between VTE risk and bleeding risk PROBLEM DIAGNOSES  Problem: Preoperative testing  Assessment and Plan: Cardiac clearance needed as per surgeon.   CBC, Comprehensive panel, T&S, HgbA1c and Nose culture ordered.   Pre-op instructions and 3 days of surgical wash given and pt verbalized understanding.      Problem: Pain due to other internal prosthetic device, implant, or graft  Assessment and Plan: Right total hip replacement revision both components direct anterior approach on 12/19/19. PROBLEM DIAGNOSES  Problem: Need for prophylactic measure  Assessment and Plan: VTE 6    Problem: Preoperative testing  Assessment and Plan: Cardiac clearance needed as per surgeon.   CBC, Comprehensive panel, T&S, HgbA1c and Nose culture ordered.   Pre-op instructions and 3 days of surgical wash given and pt verbalized understanding.      Problem: Pain due to other internal prosthetic device, implant, or graft  Assessment and Plan: Right total hip replacement revision both components direct anterior approach on 12/19/19.

## 2019-12-18 ENCOUNTER — TRANSCRIPTION ENCOUNTER (OUTPATIENT)
Age: 74
End: 2019-12-18

## 2019-12-18 PROBLEM — T85.848A PAIN DUE TO OTHER INTERNAL PROSTHETIC DEVICES, IMPLANTS AND GRAFTS, INITIAL ENCOUNTER: Chronic | Status: ACTIVE | Noted: 2019-12-02

## 2019-12-18 PROBLEM — G43.909 MIGRAINE, UNSPECIFIED, NOT INTRACTABLE, WITHOUT STATUS MIGRAINOSUS: Chronic | Status: ACTIVE | Noted: 2019-12-02

## 2019-12-19 ENCOUNTER — INPATIENT (INPATIENT)
Facility: HOSPITAL | Age: 74
LOS: 0 days | Discharge: HOME | DRG: 468 | End: 2019-12-20
Attending: ORTHOPAEDIC SURGERY | Admitting: ORTHOPAEDIC SURGERY
Payer: MEDICARE

## 2019-12-19 ENCOUNTER — APPOINTMENT (OUTPATIENT)
Dept: ORTHOPEDIC SURGERY | Facility: HOSPITAL | Age: 74
End: 2019-12-19

## 2019-12-19 ENCOUNTER — TRANSCRIPTION ENCOUNTER (OUTPATIENT)
Age: 74
End: 2019-12-19

## 2019-12-19 VITALS
RESPIRATION RATE: 18 BRPM | SYSTOLIC BLOOD PRESSURE: 139 MMHG | DIASTOLIC BLOOD PRESSURE: 83 MMHG | WEIGHT: 130.95 LBS | HEART RATE: 61 BPM | HEIGHT: 66 IN | TEMPERATURE: 97 F | OXYGEN SATURATION: 99 %

## 2019-12-19 DIAGNOSIS — T84.84XA PAIN DUE TO INTERNAL ORTHOPEDIC PROSTHETIC DEVICES, IMPLANTS AND GRAFTS, INITIAL ENCOUNTER: ICD-10-CM

## 2019-12-19 DIAGNOSIS — Z90.722 ACQUIRED ABSENCE OF OVARIES, BILATERAL: Chronic | ICD-10-CM

## 2019-12-19 DIAGNOSIS — Z29.9 ENCOUNTER FOR PROPHYLACTIC MEASURES, UNSPECIFIED: ICD-10-CM

## 2019-12-19 DIAGNOSIS — Z96.641 PRESENCE OF RIGHT ARTIFICIAL HIP JOINT: Chronic | ICD-10-CM

## 2019-12-19 LAB
GRAM STN FLD: SIGNIFICANT CHANGE UP
SPECIMEN SOURCE: SIGNIFICANT CHANGE UP

## 2019-12-19 PROCEDURE — 72170 X-RAY EXAM OF PELVIS: CPT | Mod: 26

## 2019-12-19 PROCEDURE — 27134 REVISE HIP JOINT REPLACEMENT: CPT | Mod: 52,RT

## 2019-12-19 RX ORDER — CELECOXIB 200 MG/1
200 CAPSULE ORAL ONCE
Refills: 0 | Status: COMPLETED | OUTPATIENT
Start: 2019-12-19 | End: 2019-12-19

## 2019-12-19 RX ORDER — SODIUM CHLORIDE 9 MG/ML
3 INJECTION INTRAMUSCULAR; INTRAVENOUS; SUBCUTANEOUS EVERY 8 HOURS
Refills: 0 | Status: DISCONTINUED | OUTPATIENT
Start: 2019-12-19 | End: 2019-12-19

## 2019-12-19 RX ORDER — OXYCODONE HYDROCHLORIDE 5 MG/1
10 TABLET ORAL EVERY 4 HOURS
Refills: 0 | Status: DISCONTINUED | OUTPATIENT
Start: 2019-12-19 | End: 2019-12-20

## 2019-12-19 RX ORDER — OMEPRAZOLE 10 MG/1
1 CAPSULE, DELAYED RELEASE ORAL
Qty: 28 | Refills: 0
Start: 2019-12-19 | End: 2020-01-15

## 2019-12-19 RX ORDER — TRAMADOL HYDROCHLORIDE 50 MG/1
50 TABLET ORAL ONCE
Refills: 0 | Status: DISCONTINUED | OUTPATIENT
Start: 2019-12-19 | End: 2019-12-19

## 2019-12-19 RX ORDER — TRAMADOL HYDROCHLORIDE 50 MG/1
1 TABLET ORAL
Qty: 28 | Refills: 0
Start: 2019-12-19 | End: 2019-12-25

## 2019-12-19 RX ORDER — PANTOPRAZOLE SODIUM 20 MG/1
40 TABLET, DELAYED RELEASE ORAL ONCE
Refills: 0 | Status: COMPLETED | OUTPATIENT
Start: 2019-12-19 | End: 2019-12-19

## 2019-12-19 RX ORDER — MAGNESIUM HYDROXIDE 400 MG/1
30 TABLET, CHEWABLE ORAL ONCE
Refills: 0 | Status: DISCONTINUED | OUTPATIENT
Start: 2019-12-19 | End: 2019-12-20

## 2019-12-19 RX ORDER — TRAMADOL HYDROCHLORIDE 50 MG/1
50 TABLET ORAL EVERY 8 HOURS
Refills: 0 | Status: DISCONTINUED | OUTPATIENT
Start: 2019-12-19 | End: 2019-12-20

## 2019-12-19 RX ORDER — ACETAMINOPHEN 500 MG
1000 TABLET ORAL ONCE
Refills: 0 | Status: DISCONTINUED | OUTPATIENT
Start: 2019-12-20 | End: 2019-12-20

## 2019-12-19 RX ORDER — ASPIRIN/CALCIUM CARB/MAGNESIUM 324 MG
1 TABLET ORAL
Qty: 56 | Refills: 0
Start: 2019-12-19 | End: 2020-01-15

## 2019-12-19 RX ORDER — SODIUM CHLORIDE 9 MG/ML
1000 INJECTION, SOLUTION INTRAVENOUS
Refills: 0 | Status: DISCONTINUED | OUTPATIENT
Start: 2019-12-19 | End: 2019-12-20

## 2019-12-19 RX ORDER — ACETAMINOPHEN 500 MG
3 TABLET ORAL
Qty: 252 | Refills: 0
Start: 2019-12-19 | End: 2020-01-15

## 2019-12-19 RX ORDER — SODIUM CHLORIDE 9 MG/ML
500 INJECTION INTRAMUSCULAR; INTRAVENOUS; SUBCUTANEOUS ONCE
Refills: 0 | Status: COMPLETED | OUTPATIENT
Start: 2019-12-20 | End: 2019-12-20

## 2019-12-19 RX ORDER — OXYCODONE HYDROCHLORIDE 5 MG/1
5 TABLET ORAL EVERY 4 HOURS
Refills: 0 | Status: DISCONTINUED | OUTPATIENT
Start: 2019-12-19 | End: 2019-12-20

## 2019-12-19 RX ORDER — OXYCODONE HYDROCHLORIDE 5 MG/1
2 TABLET ORAL
Qty: 84 | Refills: 0
Start: 2019-12-19 | End: 2019-12-25

## 2019-12-19 RX ORDER — PANTOPRAZOLE SODIUM 20 MG/1
40 TABLET, DELAYED RELEASE ORAL
Refills: 0 | Status: DISCONTINUED | OUTPATIENT
Start: 2019-12-19 | End: 2019-12-20

## 2019-12-19 RX ORDER — ONDANSETRON 8 MG/1
4 TABLET, FILM COATED ORAL ONCE
Refills: 0 | Status: DISCONTINUED | OUTPATIENT
Start: 2019-12-19 | End: 2019-12-19

## 2019-12-19 RX ORDER — ASPIRIN/CALCIUM CARB/MAGNESIUM 324 MG
81 TABLET ORAL
Refills: 0 | Status: DISCONTINUED | OUTPATIENT
Start: 2019-12-20 | End: 2019-12-20

## 2019-12-19 RX ORDER — ACETAMINOPHEN 500 MG
975 TABLET ORAL EVERY 8 HOURS
Refills: 0 | Status: DISCONTINUED | OUTPATIENT
Start: 2019-12-19 | End: 2019-12-20

## 2019-12-19 RX ORDER — ONDANSETRON 8 MG/1
4 TABLET, FILM COATED ORAL EVERY 6 HOURS
Refills: 0 | Status: DISCONTINUED | OUTPATIENT
Start: 2019-12-19 | End: 2019-12-20

## 2019-12-19 RX ORDER — SODIUM CHLORIDE 9 MG/ML
500 INJECTION INTRAMUSCULAR; INTRAVENOUS; SUBCUTANEOUS ONCE
Refills: 0 | Status: COMPLETED | OUTPATIENT
Start: 2019-12-19 | End: 2019-12-19

## 2019-12-19 RX ORDER — CEFAZOLIN SODIUM 1 G
2000 VIAL (EA) INJECTION EVERY 8 HOURS
Refills: 0 | Status: COMPLETED | OUTPATIENT
Start: 2019-12-19 | End: 2019-12-20

## 2019-12-19 RX ORDER — DEXAMETHASONE 0.5 MG/5ML
8 ELIXIR ORAL ONCE
Refills: 0 | Status: COMPLETED | OUTPATIENT
Start: 2019-12-20 | End: 2019-12-20

## 2019-12-19 RX ORDER — POLYETHYLENE GLYCOL 3350 17 G/17G
17 POWDER, FOR SOLUTION ORAL DAILY
Refills: 0 | Status: DISCONTINUED | OUTPATIENT
Start: 2019-12-19 | End: 2019-12-20

## 2019-12-19 RX ORDER — SUMATRIPTAN SUCCINATE 4 MG/.5ML
50 INJECTION, SOLUTION SUBCUTANEOUS DAILY
Refills: 0 | Status: DISCONTINUED | OUTPATIENT
Start: 2019-12-19 | End: 2019-12-20

## 2019-12-19 RX ORDER — SODIUM CHLORIDE 9 MG/ML
500 INJECTION INTRAMUSCULAR; INTRAVENOUS; SUBCUTANEOUS ONCE
Refills: 0 | Status: COMPLETED | OUTPATIENT
Start: 2019-12-19

## 2019-12-19 RX ORDER — SENNA PLUS 8.6 MG/1
2 TABLET ORAL AT BEDTIME
Refills: 0 | Status: DISCONTINUED | OUTPATIENT
Start: 2019-12-19 | End: 2019-12-20

## 2019-12-19 RX ORDER — ACETAMINOPHEN 500 MG
650 TABLET ORAL EVERY 6 HOURS
Refills: 0 | Status: DISCONTINUED | OUTPATIENT
Start: 2019-12-19 | End: 2019-12-19

## 2019-12-19 RX ORDER — CELECOXIB 200 MG/1
200 CAPSULE ORAL
Refills: 0 | Status: DISCONTINUED | OUTPATIENT
Start: 2019-12-21 | End: 2019-12-20

## 2019-12-19 RX ORDER — KETOROLAC TROMETHAMINE 30 MG/ML
15 SYRINGE (ML) INJECTION EVERY 6 HOURS
Refills: 0 | Status: DISCONTINUED | OUTPATIENT
Start: 2019-12-19 | End: 2019-12-20

## 2019-12-19 RX ORDER — DIPHENHYDRAMINE HCL 50 MG
12.5 CAPSULE ORAL ONCE
Refills: 0 | Status: COMPLETED | OUTPATIENT
Start: 2019-12-19 | End: 2019-12-19

## 2019-12-19 RX ORDER — FAMOTIDINE 10 MG/ML
20 INJECTION INTRAVENOUS ONCE
Refills: 0 | Status: COMPLETED | OUTPATIENT
Start: 2019-12-19 | End: 2019-12-19

## 2019-12-19 RX ORDER — DICLOFENAC SODIUM 75 MG/1
1 TABLET, DELAYED RELEASE ORAL
Qty: 0 | Refills: 0 | DISCHARGE

## 2019-12-19 RX ORDER — GABAPENTIN 400 MG/1
100 CAPSULE ORAL ONCE
Refills: 0 | Status: COMPLETED | OUTPATIENT
Start: 2019-12-19 | End: 2019-12-19

## 2019-12-19 RX ORDER — HYDROMORPHONE HYDROCHLORIDE 2 MG/ML
0.25 INJECTION INTRAMUSCULAR; INTRAVENOUS; SUBCUTANEOUS
Refills: 0 | Status: DISCONTINUED | OUTPATIENT
Start: 2019-12-19 | End: 2019-12-19

## 2019-12-19 RX ORDER — CHLORHEXIDINE GLUCONATE 213 G/1000ML
1 SOLUTION TOPICAL ONCE
Refills: 0 | Status: DISCONTINUED | OUTPATIENT
Start: 2019-12-19 | End: 2019-12-19

## 2019-12-19 RX ORDER — ACETAMINOPHEN 500 MG
1000 TABLET ORAL ONCE
Refills: 0 | Status: COMPLETED | OUTPATIENT
Start: 2019-12-19 | End: 2019-12-19

## 2019-12-19 RX ORDER — LIDOCAINE HCL 20 MG/ML
0.2 VIAL (ML) INJECTION ONCE
Refills: 0 | Status: DISCONTINUED | OUTPATIENT
Start: 2019-12-19 | End: 2019-12-19

## 2019-12-19 RX ADMIN — Medication 15 MILLIGRAM(S): at 18:48

## 2019-12-19 RX ADMIN — Medication 15 MILLIGRAM(S): at 18:22

## 2019-12-19 RX ADMIN — SUMATRIPTAN SUCCINATE 50 MILLIGRAM(S): 4 INJECTION, SOLUTION SUBCUTANEOUS at 18:24

## 2019-12-19 RX ADMIN — ONDANSETRON 4 MILLIGRAM(S): 8 TABLET, FILM COATED ORAL at 16:05

## 2019-12-19 RX ADMIN — OXYCODONE HYDROCHLORIDE 5 MILLIGRAM(S): 5 TABLET ORAL at 20:12

## 2019-12-19 RX ADMIN — SUMATRIPTAN SUCCINATE 50 MILLIGRAM(S): 4 INJECTION, SOLUTION SUBCUTANEOUS at 18:47

## 2019-12-19 RX ADMIN — Medication 12.5 MILLIGRAM(S): at 18:22

## 2019-12-19 RX ADMIN — TRAMADOL HYDROCHLORIDE 50 MILLIGRAM(S): 50 TABLET ORAL at 09:46

## 2019-12-19 RX ADMIN — Medication 1000 MILLIGRAM(S): at 21:09

## 2019-12-19 RX ADMIN — Medication 400 MILLIGRAM(S): at 21:09

## 2019-12-19 RX ADMIN — OXYCODONE HYDROCHLORIDE 5 MILLIGRAM(S): 5 TABLET ORAL at 21:09

## 2019-12-19 RX ADMIN — PANTOPRAZOLE SODIUM 40 MILLIGRAM(S): 20 TABLET, DELAYED RELEASE ORAL at 09:46

## 2019-12-19 RX ADMIN — SODIUM CHLORIDE 1000 MILLILITER(S): 9 INJECTION INTRAMUSCULAR; INTRAVENOUS; SUBCUTANEOUS at 18:23

## 2019-12-19 RX ADMIN — Medication 100 MILLIGRAM(S): at 18:23

## 2019-12-19 RX ADMIN — OXYCODONE HYDROCHLORIDE 5 MILLIGRAM(S): 5 TABLET ORAL at 16:04

## 2019-12-19 RX ADMIN — GABAPENTIN 100 MILLIGRAM(S): 400 CAPSULE ORAL at 09:46

## 2019-12-19 RX ADMIN — OXYCODONE HYDROCHLORIDE 5 MILLIGRAM(S): 5 TABLET ORAL at 16:56

## 2019-12-19 RX ADMIN — FAMOTIDINE 20 MILLIGRAM(S): 10 INJECTION INTRAVENOUS at 18:22

## 2019-12-19 NOTE — DISCHARGE NOTE PROVIDER - NSDCFUSCHEDAPPT_GEN_ALL_CORE_FT
LOWENBERG, RUTH ; 01/02/2020 ; [facility] LOWENBERG, RUTH ; 01/02/2020 ; [facility] LOWENBERG, RUTH ; 01/02/2020 ; [facility]

## 2019-12-19 NOTE — CHART NOTE - NSCHARTNOTEFT_GEN_A_CORE
Patient seen on floor,  at bedside. Resting with complaint of moderate RLE pain.  No Chest Pain, SOB, N/V.    T(C): 34.7 (12-19-19 @ 15:49), Max: 36.2 (12-19-19 @ 09:19)  HR: 58 (12-19-19 @ 15:49) (53 - 70)  BP: 120/73 (12-19-19 @ 15:49) (104/53 - 139/83)  RR: 16 (12-19-19 @ 15:49) (12 - 18)  SpO2: 96% (12-19-19 @ 15:49) (94% - 100%)      Exam:  Alert and oriented, no acute distress  Laterality: RLE hip aquacel in place, C/D/I  Calves soft, non-tender bilaterally  +PF/DF/EHL/FHL  SILT  + DP pulse    Xray: in chart             A/P: 74yFemale S/p  R Total Hip Arthroplasty Revision. VSS. NAD  -PT/OT-WBAT RLE, OOB  when tolerated  -IS encouraged  -DVT PPx with ASA 81 mg  -Followup AM labs  -Pain Control      Keira Cabello PA-C  Team Pager #[phone]

## 2019-12-19 NOTE — DISCHARGE NOTE PROVIDER - NSDCFUADDINST_GEN_ALL_CORE_FT
Follow up with Dr. Robles in outpatient office in 2 weeks, call for appt: [phone]  You may shower with dressing, allow water to run over dressing, do not submerge in water  You may weight bear as tolerated on operative leg  No hip dislocation precautions  Medications sent to pharmacy

## 2019-12-19 NOTE — BRIEF OPERATIVE NOTE - NSICDXBRIEFPROCEDURE_GEN_ALL_CORE_FT
PROCEDURES:  Revision, hip arthroplasty, total, both components 19-Dec-2019 13:22:07  Óscar Connolly T

## 2019-12-19 NOTE — DISCHARGE NOTE PROVIDER - HOSPITAL COURSE
Patient underwent R KALPANA via direct anterior approach on 4/11/19.  Postoperatively in the outpatient office she complained of pain and difficulty lifting right hip and with straight leg raises. Pt seen by surgeon and s/p CT scan showing prominent screw in pelvis.  Pt stated "the screw will need to be removed" due to her discomfort. Pt rated right hip pain to be 4/10 when trying to lift right hip to put clothing on and transferring positions. Pt able to ambulate without difficulty and still was able to go to gym 5 times weekly, but had aforementioned discomfort. Pt denied right leg neuralgia. Pt elected for right total hip replacement revision both components direct anterior approach on 12/19/19.  On 12/19/19 the patient underwent revision of femoral head and acetabular liner with removal of acetabular screws.  The cup was noted to be prominent causing the iliopsoas tendon to impinge, so the tendon was partially released.  There were no intraoperative or postoperative complications.  The patient was seen by PT and cleared for discharge home, she was requesting to go home the day of surgery so was discharged home.  All questions were answered and the patient was in agreement with discharge plan.

## 2019-12-19 NOTE — BRIEF OPERATIVE NOTE - OPERATION/FINDINGS
Femoral head and acetabular liner exchange with removal of acetabular screws, partial release of iliopsoas tendon

## 2019-12-19 NOTE — PHYSICAL THERAPY INITIAL EVALUATION ADULT - PERTINENT HX OF CURRENT PROBLEM, REHAB EVAL
74 y/o F with no significant PMH, s/p R THR 4/11/19, now presents with c/o R hip pain and difficulty lifting R hip. Pt seen by surgeon and now presents s/p R total hip revision via anterior approach.

## 2019-12-19 NOTE — DISCHARGE NOTE PROVIDER - NSDCCPCAREPLAN_GEN_ALL_CORE_FT
PRINCIPAL DISCHARGE DIAGNOSIS  Diagnosis: Pain due to other internal prosthetic device, implant, or graft  Assessment and Plan of Treatment: now s/p R KALPANA revision.

## 2019-12-19 NOTE — PHYSICAL THERAPY INITIAL EVALUATION ADULT - PLANNED THERAPY INTERVENTIONS, PT EVAL
gait training/GOAL: Pt will negotiate 9 steps with unilateral handrail in a step-to pattern independently within 2 weeks/strengthening/balance training

## 2019-12-19 NOTE — PHYSICAL THERAPY INITIAL EVALUATION ADULT - GENERAL OBSERVATIONS, REHAB EVAL
Pt valery 40 min eval well. Pt s/p R THR revision, anterior precautions, WBAT. First attempt, pt just transferred from PACU, asking for pain meds. Pt now rec'd sitting upright at EOB (just ambulated with staff to bathroom), premedicated, spouse at bedside. Pt with c/o nausea throughout session limiting pt's progress, vitals stable, pt agreed to ambulate, no change in symptoms with activity.

## 2019-12-19 NOTE — DISCHARGE NOTE PROVIDER - NSDCCPTREATMENT_GEN_ALL_CORE_FT
PRINCIPAL PROCEDURE  Procedure: Revision, hip arthroplasty, total, both components  Findings and Treatment:

## 2019-12-20 ENCOUNTER — TRANSCRIPTION ENCOUNTER (OUTPATIENT)
Age: 74
End: 2019-12-20

## 2019-12-20 ENCOUNTER — INBOUND DOCUMENT (OUTPATIENT)
Age: 74
End: 2019-12-20

## 2019-12-20 VITALS
HEART RATE: 62 BPM | DIASTOLIC BLOOD PRESSURE: 59 MMHG | SYSTOLIC BLOOD PRESSURE: 97 MMHG | TEMPERATURE: 99 F | RESPIRATION RATE: 18 BRPM | OXYGEN SATURATION: 98 %

## 2019-12-20 LAB
ANION GAP SERPL CALC-SCNC: 8 MMOL/L — SIGNIFICANT CHANGE UP (ref 5–17)
BUN SERPL-MCNC: 14 MG/DL — SIGNIFICANT CHANGE UP (ref 7–23)
CALCIUM SERPL-MCNC: 8.8 MG/DL — SIGNIFICANT CHANGE UP (ref 8.4–10.5)
CHLORIDE SERPL-SCNC: 104 MMOL/L — SIGNIFICANT CHANGE UP (ref 96–108)
CO2 SERPL-SCNC: 25 MMOL/L — SIGNIFICANT CHANGE UP (ref 22–31)
CREAT SERPL-MCNC: 0.64 MG/DL — SIGNIFICANT CHANGE UP (ref 0.5–1.3)
GLUCOSE SERPL-MCNC: 101 MG/DL — HIGH (ref 70–99)
HCT VFR BLD CALC: 32.8 % — LOW (ref 34.5–45)
HGB BLD-MCNC: 11 G/DL — LOW (ref 11.5–15.5)
MCHC RBC-ENTMCNC: 31 PG — SIGNIFICANT CHANGE UP (ref 27–34)
MCHC RBC-ENTMCNC: 33.5 GM/DL — SIGNIFICANT CHANGE UP (ref 32–36)
MCV RBC AUTO: 92.4 FL — SIGNIFICANT CHANGE UP (ref 80–100)
PLATELET # BLD AUTO: 143 K/UL — LOW (ref 150–400)
POTASSIUM SERPL-MCNC: 4.2 MMOL/L — SIGNIFICANT CHANGE UP (ref 3.5–5.3)
POTASSIUM SERPL-SCNC: 4.2 MMOL/L — SIGNIFICANT CHANGE UP (ref 3.5–5.3)
RBC # BLD: 3.55 M/UL — LOW (ref 3.8–5.2)
RBC # FLD: 12.8 % — SIGNIFICANT CHANGE UP (ref 10.3–14.5)
SODIUM SERPL-SCNC: 137 MMOL/L — SIGNIFICANT CHANGE UP (ref 135–145)
WBC # BLD: 7.45 K/UL — SIGNIFICANT CHANGE UP (ref 3.8–10.5)
WBC # FLD AUTO: 7.45 K/UL — SIGNIFICANT CHANGE UP (ref 3.8–10.5)

## 2019-12-20 PROCEDURE — C1776: CPT

## 2019-12-20 PROCEDURE — 80048 BASIC METABOLIC PNL TOTAL CA: CPT

## 2019-12-20 PROCEDURE — 87102 FUNGUS ISOLATION CULTURE: CPT

## 2019-12-20 PROCEDURE — 87070 CULTURE OTHR SPECIMN AEROBIC: CPT

## 2019-12-20 PROCEDURE — 97165 OT EVAL LOW COMPLEX 30 MIN: CPT

## 2019-12-20 PROCEDURE — 97161 PT EVAL LOW COMPLEX 20 MIN: CPT

## 2019-12-20 PROCEDURE — 97116 GAIT TRAINING THERAPY: CPT

## 2019-12-20 PROCEDURE — 72170 X-RAY EXAM OF PELVIS: CPT

## 2019-12-20 PROCEDURE — C1713: CPT

## 2019-12-20 PROCEDURE — 85027 COMPLETE CBC AUTOMATED: CPT

## 2019-12-20 PROCEDURE — 82962 GLUCOSE BLOOD TEST: CPT

## 2019-12-20 PROCEDURE — 87205 SMEAR GRAM STAIN: CPT

## 2019-12-20 PROCEDURE — 87075 CULTR BACTERIA EXCEPT BLOOD: CPT

## 2019-12-20 PROCEDURE — 97530 THERAPEUTIC ACTIVITIES: CPT

## 2019-12-20 RX ADMIN — SUMATRIPTAN SUCCINATE 50 MILLIGRAM(S): 4 INJECTION, SOLUTION SUBCUTANEOUS at 12:08

## 2019-12-20 RX ADMIN — Medication 1 TABLET(S): at 11:14

## 2019-12-20 RX ADMIN — Medication 15 MILLIGRAM(S): at 11:14

## 2019-12-20 RX ADMIN — Medication 15 MILLIGRAM(S): at 05:39

## 2019-12-20 RX ADMIN — Medication 975 MILLIGRAM(S): at 05:40

## 2019-12-20 RX ADMIN — Medication 81 MILLIGRAM(S): at 05:37

## 2019-12-20 RX ADMIN — Medication 15 MILLIGRAM(S): at 11:30

## 2019-12-20 RX ADMIN — OXYCODONE HYDROCHLORIDE 5 MILLIGRAM(S): 5 TABLET ORAL at 00:51

## 2019-12-20 RX ADMIN — Medication 100 MILLIGRAM(S): at 05:38

## 2019-12-20 RX ADMIN — Medication 975 MILLIGRAM(S): at 06:30

## 2019-12-20 RX ADMIN — OXYCODONE HYDROCHLORIDE 5 MILLIGRAM(S): 5 TABLET ORAL at 01:45

## 2019-12-20 RX ADMIN — Medication 101.6 MILLIGRAM(S): at 06:23

## 2019-12-20 RX ADMIN — SODIUM CHLORIDE 1000 MILLILITER(S): 9 INJECTION INTRAMUSCULAR; INTRAVENOUS; SUBCUTANEOUS at 05:44

## 2019-12-20 RX ADMIN — PANTOPRAZOLE SODIUM 40 MILLIGRAM(S): 20 TABLET, DELAYED RELEASE ORAL at 05:37

## 2019-12-20 RX ADMIN — SODIUM CHLORIDE 125 MILLILITER(S): 9 INJECTION, SOLUTION INTRAVENOUS at 05:39

## 2019-12-20 RX ADMIN — Medication 15 MILLIGRAM(S): at 06:30

## 2019-12-20 RX ADMIN — Medication 15 MILLIGRAM(S): at 00:45

## 2019-12-20 RX ADMIN — Medication 15 MILLIGRAM(S): at 01:45

## 2019-12-20 RX ADMIN — POLYETHYLENE GLYCOL 3350 17 GRAM(S): 17 POWDER, FOR SOLUTION ORAL at 11:14

## 2019-12-20 NOTE — DISCHARGE NOTE NURSING/CASE MANAGEMENT/SOCIAL WORK - PATIENT PORTAL LINK FT
You can access the FollowMyHealth Patient Portal offered by [facility] by registering at the following website: [email or web address]. By joining [facility]’s FollowMyHealth portal, you will also be able to view your health information using other applications (apps) compatible with our system.

## 2019-12-20 NOTE — PROGRESS NOTE ADULT - ATTENDING COMMENTS
I agree with the above note on this patient. All pertinent films have been reviewed. Please refer to clinical documentation of the history, physical examinations, data summary, and both assessment and plan as documented above and with which I agree.    dispo home today    Zachary Robles MD  Attending Orthopedic Surgeon

## 2019-12-20 NOTE — PROGRESS NOTE ADULT - SUBJECTIVE AND OBJECTIVE BOX
Orthopaedic Surgery Progress Note    Subjective:   Patient seen and examined  No acute events overnight  Pain well controlled    Objective:  T(C): 36.8 (12-20-19 @ 05:20), Max: 36.8 (12-20-19 @ 05:20)  HR: 54 (12-20-19 @ 05:20) (53 - 70)  BP: 94/58 (12-20-19 @ 05:20) (94/58 - 139/83)  RR: 18 (12-20-19 @ 05:20) (12 - 18)  SpO2: 96% (12-20-19 @ 05:20) (94% - 100%)  Wt(kg): --    12-19 @ 07:01  -  12-20 @ 06:21  --------------------------------------------------------  IN: 2415 mL / OUT: 2325 mL / NET: 90 mL        PE    NAD  RLE:   dressing C/D/I  motor intact GS/TA/EHL  SILT S/S/SP/DP  WWP        74y Female s/p revision R KALPANA w/ head/liner exchange  - Pain control  - WBAT  - FU OR Cxs (GS neg)  - PT/OT/OOB  - DVT ppx  - Dispo planning

## 2019-12-20 NOTE — OCCUPATIONAL THERAPY INITIAL EVALUATION ADULT - LIVES WITH, PROFILE
lives with  in a house, +shower stall, +standard toilet. Independent at baseline, +driving, +working.

## 2019-12-29 ENCOUNTER — CLINICAL ADVICE (OUTPATIENT)
Age: 74
End: 2019-12-29

## 2020-01-02 ENCOUNTER — APPOINTMENT (OUTPATIENT)
Dept: ORTHOPEDIC SURGERY | Facility: CLINIC | Age: 75
End: 2020-01-02
Payer: MEDICARE

## 2020-01-02 LAB
CULTURE RESULTS: SIGNIFICANT CHANGE UP
CULTURE RESULTS: SIGNIFICANT CHANGE UP
SPECIMEN SOURCE: SIGNIFICANT CHANGE UP
SPECIMEN SOURCE: SIGNIFICANT CHANGE UP

## 2020-01-02 PROCEDURE — 99024 POSTOP FOLLOW-UP VISIT: CPT

## 2020-01-02 NOTE — PHYSICAL EXAM
[de-identified] : Examination reveals satisfactory wound healing. No surrounding erythema. Motion is good and relatively pain free. Leg lengths are acceptable.\par \par

## 2020-01-02 NOTE — DISCUSSION/SUMMARY
[de-identified] : Pt is s/p R KALPANA revision 12/19/19 \par Dressing and sutures removed, Beonzoin spray and steri-strips applied.\par Pt advised to shower as normal, allowing the soapy water to run over the wound area.  Do not scrub.  Pat completely dry after showering.\par Written infectious precautions were reviewed. \par The patient will progress from a cane at this time. \par Continue Aspirin therapy x 30 days from surgery for the purpose of orthopedic thromboembolism prophylaxis. \par Return at 6 weeks from surgery for follow-up evaluation.\par \par \par \par

## 2020-01-03 LAB
CULTURE RESULTS: SIGNIFICANT CHANGE UP
SPECIMEN SOURCE: SIGNIFICANT CHANGE UP

## 2020-01-18 LAB
CULTURE RESULTS: SIGNIFICANT CHANGE UP
SPECIMEN SOURCE: SIGNIFICANT CHANGE UP

## 2020-01-29 ENCOUNTER — APPOINTMENT (OUTPATIENT)
Dept: ORTHOPEDIC SURGERY | Facility: CLINIC | Age: 75
End: 2020-01-29
Payer: MEDICARE

## 2020-01-29 PROCEDURE — 99024 POSTOP FOLLOW-UP VISIT: CPT

## 2020-01-29 PROCEDURE — 73502 X-RAY EXAM HIP UNI 2-3 VIEWS: CPT | Mod: RT

## 2020-01-29 NOTE — PHYSICAL EXAM
[de-identified] : Examination reveals satisfactory wound healing. No surrounding erythema. Motion is good and relatively pain free. Leg lengths are acceptable.\par \par  [de-identified] : AP pelvis, AP hip, and lateral x-rays of the right hip were ordered and obtained in the office and demonstrate satisfactory position and alignment of the components are present. No signs of loosening are seen.\par \par

## 2020-01-29 NOTE — HISTORY OF PRESENT ILLNESS
[de-identified] : The patient returns today for routine evaluation after R KALPANA revision 12/19/19. Excellent progress is noted in terms of pain and restoration of function. Pain is well controlled without oral medications. The patient does not require assistive devices.  She still complains of some pain with twisting motions about the hip.  She did a few PT sessions and is now doing exercises on her own.\par \par

## 2020-01-29 NOTE — DISCUSSION/SUMMARY
[de-identified] : Pt is s/p R KALPANA revision 12/19/19 doing well.  Discussed with her that any residual pains are still due to muscle healing and wound healing.  She needs to give more time to heal before we can make an assessment on whether her anterior hip pain has resolved.  NSAIDs PRN.  Follow-up in 2 months.\par \par \par \par \par

## 2020-03-18 ENCOUNTER — APPOINTMENT (OUTPATIENT)
Dept: ORTHOPEDIC SURGERY | Facility: CLINIC | Age: 75
End: 2020-03-18
Payer: MEDICARE

## 2020-03-18 ENCOUNTER — FORM ENCOUNTER (OUTPATIENT)
Age: 75
End: 2020-03-18

## 2020-03-18 PROCEDURE — 99024 POSTOP FOLLOW-UP VISIT: CPT

## 2020-05-08 NOTE — H&P PST ADULT - SUPRACLAVICULAR R
Problem: Malnutrition  Goal: Improved Nutritional Intake  Description  Malnutrition related to chronic catabolic state as evidence by 9.2 kg weight loss in 3 months, <25% intake for 1 month, nutrition physical exam showing severe muscle mass loss, and nutrition physical exam showing showing sever subcutaneous fat loss   Outcome: Ongoing, Not Progressing  Intervention: Promote and Optimize Nutrition Support  Flowsheets (Taken 5/8/2020 1240)  Nutrition Support Management: other (see comments) (Recommend tube feeding to start once gastric issues are examined more in depth. Also, Boost Breeze TID. Texture changed to soft (bite cut), which will increase intake due to patient's decrease dextirity. MD ordered PT & OT to evaluate patient. RD plans to f/o with patient on Monday. )      normal

## 2020-05-19 ENCOUNTER — FORM ENCOUNTER (OUTPATIENT)
Age: 75
End: 2020-05-19

## 2020-05-20 ENCOUNTER — APPOINTMENT (OUTPATIENT)
Dept: OBGYN | Facility: CLINIC | Age: 75
End: 2020-05-20
Payer: MEDICARE

## 2020-05-20 ENCOUNTER — FORM ENCOUNTER (OUTPATIENT)
Age: 75
End: 2020-05-20

## 2020-05-20 ENCOUNTER — RESULT REVIEW (OUTPATIENT)
Age: 75
End: 2020-05-20

## 2020-05-20 DIAGNOSIS — R92.8 OTHER ABNORMAL AND INCONCLUSIVE FINDINGS ON DIAGNOSTIC IMAGING OF BREAST: ICD-10-CM

## 2020-05-20 PROCEDURE — G0101: CPT

## 2020-05-21 ENCOUNTER — FORM ENCOUNTER (OUTPATIENT)
Age: 75
End: 2020-05-21

## 2020-06-02 ENCOUNTER — FORM ENCOUNTER (OUTPATIENT)
Age: 75
End: 2020-06-02

## 2020-06-08 ENCOUNTER — OUTPATIENT (OUTPATIENT)
Dept: OUTPATIENT SERVICES | Facility: HOSPITAL | Age: 75
LOS: 1 days | End: 2020-06-08
Payer: MEDICARE

## 2020-06-08 ENCOUNTER — APPOINTMENT (OUTPATIENT)
Dept: MRI IMAGING | Facility: CLINIC | Age: 75
End: 2020-06-08
Payer: MEDICARE

## 2020-06-08 DIAGNOSIS — Z90.722 ACQUIRED ABSENCE OF OVARIES, BILATERAL: Chronic | ICD-10-CM

## 2020-06-08 DIAGNOSIS — Z96.641 PRESENCE OF RIGHT ARTIFICIAL HIP JOINT: Chronic | ICD-10-CM

## 2020-06-08 DIAGNOSIS — Z00.8 ENCOUNTER FOR OTHER GENERAL EXAMINATION: ICD-10-CM

## 2020-06-08 PROCEDURE — 72141 MRI NECK SPINE W/O DYE: CPT | Mod: 26

## 2020-06-08 PROCEDURE — 72141 MRI NECK SPINE W/O DYE: CPT

## 2020-06-16 ENCOUNTER — APPOINTMENT (OUTPATIENT)
Dept: WOUND CARE | Facility: CLINIC | Age: 75
End: 2020-06-16

## 2020-08-31 ENCOUNTER — OUTPATIENT (OUTPATIENT)
Dept: OUTPATIENT SERVICES | Facility: HOSPITAL | Age: 75
LOS: 1 days | Discharge: HOME | End: 2020-08-31
Payer: MEDICARE

## 2020-08-31 VITALS
WEIGHT: 134.26 LBS | TEMPERATURE: 98 F | SYSTOLIC BLOOD PRESSURE: 116 MMHG | HEART RATE: 62 BPM | OXYGEN SATURATION: 98 % | DIASTOLIC BLOOD PRESSURE: 74 MMHG

## 2020-08-31 DIAGNOSIS — M19.012 PRIMARY OSTEOARTHRITIS, LEFT SHOULDER: ICD-10-CM

## 2020-08-31 DIAGNOSIS — M81.0 AGE-RELATED OSTEOPOROSIS WITHOUT CURRENT PATHOLOGICAL FRACTURE: ICD-10-CM

## 2020-08-31 DIAGNOSIS — Z96.641 PRESENCE OF RIGHT ARTIFICIAL HIP JOINT: Chronic | ICD-10-CM

## 2020-08-31 DIAGNOSIS — Z86.69 PERSONAL HISTORY OF OTHER DISEASES OF THE NERVOUS SYSTEM AND SENSE ORGANS: ICD-10-CM

## 2020-08-31 DIAGNOSIS — Z01.818 ENCOUNTER FOR OTHER PREPROCEDURAL EXAMINATION: ICD-10-CM

## 2020-08-31 DIAGNOSIS — Z90.722 ACQUIRED ABSENCE OF OVARIES, BILATERAL: Chronic | ICD-10-CM

## 2020-08-31 LAB
A1C WITH ESTIMATED AVERAGE GLUCOSE RESULT: 5 % — SIGNIFICANT CHANGE UP (ref 4–5.6)
ANION GAP SERPL CALC-SCNC: 6 MMOL/L — SIGNIFICANT CHANGE UP (ref 5–17)
APTT BLD: 30 SEC — SIGNIFICANT CHANGE UP (ref 27.5–35.5)
BLD GP AB SCN SERPL QL: SIGNIFICANT CHANGE UP
BUN SERPL-MCNC: 17 MG/DL — SIGNIFICANT CHANGE UP (ref 7–23)
CALCIUM SERPL-MCNC: 9 MG/DL — SIGNIFICANT CHANGE UP (ref 8.5–10.1)
CHLORIDE SERPL-SCNC: 107 MMOL/L — SIGNIFICANT CHANGE UP (ref 96–108)
CO2 SERPL-SCNC: 30 MMOL/L — SIGNIFICANT CHANGE UP (ref 22–31)
CREAT SERPL-MCNC: 0.64 MG/DL — SIGNIFICANT CHANGE UP (ref 0.5–1.3)
ESTIMATED AVERAGE GLUCOSE: 97 MG/DL — SIGNIFICANT CHANGE UP (ref 68–114)
GLUCOSE SERPL-MCNC: 84 MG/DL — SIGNIFICANT CHANGE UP (ref 70–99)
HCT VFR BLD CALC: 39.8 % — SIGNIFICANT CHANGE UP (ref 34.5–45)
HGB BLD-MCNC: 13.1 G/DL — SIGNIFICANT CHANGE UP (ref 11.5–15.5)
INR BLD: 1.01 RATIO — SIGNIFICANT CHANGE UP (ref 0.88–1.16)
MCHC RBC-ENTMCNC: 30.5 PG — SIGNIFICANT CHANGE UP (ref 27–34)
MCHC RBC-ENTMCNC: 32.9 GM/DL — SIGNIFICANT CHANGE UP (ref 32–36)
MCV RBC AUTO: 92.8 FL — SIGNIFICANT CHANGE UP (ref 80–100)
MRSA PCR RESULT.: SIGNIFICANT CHANGE UP
NRBC # BLD: 0 /100 WBCS — SIGNIFICANT CHANGE UP (ref 0–0)
PLATELET # BLD AUTO: 178 K/UL — SIGNIFICANT CHANGE UP (ref 150–400)
POTASSIUM SERPL-MCNC: 4.3 MMOL/L — SIGNIFICANT CHANGE UP (ref 3.5–5.3)
POTASSIUM SERPL-SCNC: 4.3 MMOL/L — SIGNIFICANT CHANGE UP (ref 3.5–5.3)
PROTHROM AB SERPL-ACNC: 11.7 SEC — SIGNIFICANT CHANGE UP (ref 10.6–13.6)
RBC # BLD: 4.29 M/UL — SIGNIFICANT CHANGE UP (ref 3.8–5.2)
RBC # FLD: 13.1 % — SIGNIFICANT CHANGE UP (ref 10.3–14.5)
S AUREUS DNA NOSE QL NAA+PROBE: SIGNIFICANT CHANGE UP
SODIUM SERPL-SCNC: 143 MMOL/L — SIGNIFICANT CHANGE UP (ref 135–145)
WBC # BLD: 5.81 K/UL — SIGNIFICANT CHANGE UP (ref 3.8–10.5)
WBC # FLD AUTO: 5.81 K/UL — SIGNIFICANT CHANGE UP (ref 3.8–10.5)

## 2020-08-31 PROCEDURE — 93010 ELECTROCARDIOGRAM REPORT: CPT

## 2020-08-31 NOTE — H&P PST ADULT - HISTORY OF PRESENT ILLNESS
75F migraine 75F pmh migraines, osteoporosis c/o left shoulder pain 2/2 primary osteoarthritis of left shoulder here for PST for scheduled left total shoulder vs. left reverse shoulder arthroplasty  this patient denies any fever, cough, sob flu like symptoms, has traveled to [place] in the past 30 days

## 2020-08-31 NOTE — H&P PST ADULT - NSICDXPROBLEM_GEN_ALL_CORE_FT
PROBLEM DIAGNOSES  Problem: Primary osteoarthritis, left shoulder  Assessment and Plan: labs - cbc,pt/ptt,bmp,t&s,nose cx,ekg  M/C required  preop 3 day hibiclens instruction reviewed and given .instructed on if  nose cx positive use mupuricin 5 days and checklist given  take routine meds DOS with sips of water. avoid NSAID and OTC supplements. verbalized understanding  information on proper nutrition , increase protein and better food choices provided in packet       Problem: Osteoporosis  Assessment and Plan: Continue current regimen and medications.     Problem: H/O migraine  Assessment and Plan: Continue current regimen and medications.

## 2020-08-31 NOTE — H&P PST ADULT - ASSESSMENT
75F pmh migraines, osteoporosis c/o left shoulder pain 2/2 primary osteoarthritis of left shoulder here for PST for scheduled left total shoulder vs. left reverse shoulder arthroplasty  CAPRINI SCORE    AGE RELATED RISK FACTORS                                                       MOBILITY RELATED FACTORS  [ ] Age 41-60 years                                            (1 Point)                  [ ] Bed rest                                                        (1 Point)  [ ] Age: 61-74 years                                           (2 Points)                [ ] Plaster cast                                                   (2 Points)  [x ] Age= 75 years                                              (3 Points)                 [ ] Bed bound for more than 72 hours                   (2 Points)    DISEASE RELATED RISK FACTORS                                               GENDER SPECIFIC FACTORS  [ ] Edema in the lower extremities                       (1 Point)                  [ ] Pregnancy                                                     (1 Point)  [ ] Varicose veins                                               (1 Point)                  [ ] Post-partum < 6 weeks                                   (1 Point)             [ ] BMI > 25 Kg/m2                                            (1 Point)                  [ ] Hormonal therapy  or oral contraception            (1 Point)                 [ ] Sepsis (in the previous month)                        (1 Point)                  [ ] History of pregnancy complications  [ ] Pneumonia or serious lung disease                                               [ ] Unexplained or recurrent                       (1 Point)           (in the previous month)                               (1 Point)  [ ] Abnormal pulmonary function test                     (1 Point)                 SURGERY RELATED RISK FACTORS  [ ] Acute myocardial infarction                              (1 Point)                 [ ]  Section                                            (1 Point)  [ ] Congestive heart failure (in the previous month)  (1 Point)                 [ ] Minor surgery                                                 (1 Point)   [ ] Inflammatory bowel disease                             (1 Point)                 [ ] Arthroscopic surgery                                        (2 Points)  [ ] Central venous access                                    (2 Points)                [ ] General surgery lasting more than 45 minutes   (2 Points)       [ ] Stroke (in the previous month)                          (5 Points)               [ x] Elective arthroplasty                                        (5 Points)                                                                                                                                               HEMATOLOGY RELATED FACTORS                                                 TRAUMA RELATED RISK FACTORS  [ ] Prior episodes of VTE                                     (3 Points)                 [ ] Fracture of the hip, pelvis, or leg                       (5 Points)  [ ] Positive family history for VTE                         (3 Points)                 [ ] Acute spinal cord injury (in the previous month)  (5 Points)  [ ] Prothrombin 69834 A                                      (3 Points)                 [ ] Paralysis  (less than 1 month)                          (5 Points)  [ ] Factor V Leiden                                             (3 Points)                 [ ] Multiple Trauma within 1 month                         (5 Points)  [ ] Lupus anticoagulants                                     (3 Points)                                                           [ ] Anticardiolipin antibodies                                (3 Points)                                                       [ ] High homocysteine in the blood                      (3 Points)                                             [ ] Other congenital or acquired thrombophilia       (3 Points)                                                [ ] Heparin induced thrombocytopenia                  (3 Points)                                          Total Score [     8    ]

## 2020-09-13 ENCOUNTER — TRANSCRIPTION ENCOUNTER (OUTPATIENT)
Age: 75
End: 2020-09-13

## 2020-09-14 ENCOUNTER — TRANSCRIPTION ENCOUNTER (OUTPATIENT)
Age: 75
End: 2020-09-14

## 2020-09-14 ENCOUNTER — INPATIENT (INPATIENT)
Facility: HOSPITAL | Age: 75
LOS: 0 days | Discharge: HOME | End: 2020-09-15
Attending: ORTHOPAEDIC SURGERY | Admitting: ORTHOPAEDIC SURGERY
Payer: MEDICARE

## 2020-09-14 ENCOUNTER — RESULT REVIEW (OUTPATIENT)
Age: 75
End: 2020-09-14

## 2020-09-14 VITALS
DIASTOLIC BLOOD PRESSURE: 68 MMHG | SYSTOLIC BLOOD PRESSURE: 113 MMHG | TEMPERATURE: 98 F | WEIGHT: 132.94 LBS | HEART RATE: 80 BPM | OXYGEN SATURATION: 99 % | HEIGHT: 66 IN | RESPIRATION RATE: 14 BRPM

## 2020-09-14 DIAGNOSIS — Z96.641 PRESENCE OF RIGHT ARTIFICIAL HIP JOINT: Chronic | ICD-10-CM

## 2020-09-14 DIAGNOSIS — Z90.722 ACQUIRED ABSENCE OF OVARIES, BILATERAL: Chronic | ICD-10-CM

## 2020-09-14 PROCEDURE — 88311 DECALCIFY TISSUE: CPT | Mod: 26

## 2020-09-14 PROCEDURE — 88309 TISSUE EXAM BY PATHOLOGIST: CPT | Mod: 26

## 2020-09-14 RX ORDER — SUMATRIPTAN SUCCINATE 4 MG/.5ML
50 INJECTION, SOLUTION SUBCUTANEOUS DAILY
Refills: 0 | Status: DISCONTINUED | OUTPATIENT
Start: 2020-09-14 | End: 2020-09-15

## 2020-09-14 RX ORDER — HYDROMORPHONE HYDROCHLORIDE 2 MG/ML
0.5 INJECTION INTRAMUSCULAR; INTRAVENOUS; SUBCUTANEOUS
Refills: 0 | Status: DISCONTINUED | OUTPATIENT
Start: 2020-09-14 | End: 2020-09-14

## 2020-09-14 RX ORDER — OXYCODONE HYDROCHLORIDE 5 MG/1
10 TABLET ORAL EVERY 4 HOURS
Refills: 0 | Status: DISCONTINUED | OUTPATIENT
Start: 2020-09-14 | End: 2020-09-15

## 2020-09-14 RX ORDER — SENNA PLUS 8.6 MG/1
2 TABLET ORAL AT BEDTIME
Refills: 0 | Status: DISCONTINUED | OUTPATIENT
Start: 2020-09-14 | End: 2020-09-15

## 2020-09-14 RX ORDER — ASPIRIN/CALCIUM CARB/MAGNESIUM 324 MG
325 TABLET ORAL DAILY
Refills: 0 | Status: DISCONTINUED | OUTPATIENT
Start: 2020-09-15 | End: 2020-09-15

## 2020-09-14 RX ORDER — DIPHENHYDRAMINE HCL 50 MG
25 CAPSULE ORAL AT BEDTIME
Refills: 0 | Status: DISCONTINUED | OUTPATIENT
Start: 2020-09-14 | End: 2020-09-15

## 2020-09-14 RX ORDER — TRANEXAMIC ACID 100 MG/ML
1000 INJECTION, SOLUTION INTRAVENOUS ONCE
Refills: 0 | Status: COMPLETED | OUTPATIENT
Start: 2020-09-14 | End: 2020-09-14

## 2020-09-14 RX ORDER — ACETAMINOPHEN 500 MG
650 TABLET ORAL EVERY 6 HOURS
Refills: 0 | Status: DISCONTINUED | OUTPATIENT
Start: 2020-09-14 | End: 2020-09-15

## 2020-09-14 RX ORDER — SODIUM CHLORIDE 9 MG/ML
3 INJECTION INTRAMUSCULAR; INTRAVENOUS; SUBCUTANEOUS EVERY 8 HOURS
Refills: 0 | Status: DISCONTINUED | OUTPATIENT
Start: 2020-09-14 | End: 2020-09-14

## 2020-09-14 RX ORDER — SODIUM CHLORIDE 9 MG/ML
1000 INJECTION, SOLUTION INTRAVENOUS
Refills: 0 | Status: DISCONTINUED | OUTPATIENT
Start: 2020-09-14 | End: 2020-09-15

## 2020-09-14 RX ORDER — HYDROMORPHONE HYDROCHLORIDE 2 MG/ML
0.25 INJECTION INTRAMUSCULAR; INTRAVENOUS; SUBCUTANEOUS
Refills: 0 | Status: DISCONTINUED | OUTPATIENT
Start: 2020-09-14 | End: 2020-09-14

## 2020-09-14 RX ORDER — PETROLATUM,WHITE
1 JELLY (GRAM) TOPICAL THREE TIMES A DAY
Refills: 0 | Status: DISCONTINUED | OUTPATIENT
Start: 2020-09-14 | End: 2020-09-15

## 2020-09-14 RX ORDER — KETOROLAC TROMETHAMINE 30 MG/ML
30 SYRINGE (ML) INJECTION ONCE
Refills: 0 | Status: DISCONTINUED | OUTPATIENT
Start: 2020-09-14 | End: 2020-09-14

## 2020-09-14 RX ORDER — SODIUM CHLORIDE 9 MG/ML
1000 INJECTION, SOLUTION INTRAVENOUS
Refills: 0 | Status: DISCONTINUED | OUTPATIENT
Start: 2020-09-14 | End: 2020-09-14

## 2020-09-14 RX ORDER — HYDROMORPHONE HYDROCHLORIDE 2 MG/ML
0.5 INJECTION INTRAMUSCULAR; INTRAVENOUS; SUBCUTANEOUS
Refills: 0 | Status: DISCONTINUED | OUTPATIENT
Start: 2020-09-14 | End: 2020-09-15

## 2020-09-14 RX ORDER — HYDROMORPHONE HYDROCHLORIDE 2 MG/ML
0.5 INJECTION INTRAMUSCULAR; INTRAVENOUS; SUBCUTANEOUS EVERY 4 HOURS
Refills: 0 | Status: DISCONTINUED | OUTPATIENT
Start: 2020-09-14 | End: 2020-09-15

## 2020-09-14 RX ORDER — POLYETHYLENE GLYCOL 3350 17 G/17G
17 POWDER, FOR SOLUTION ORAL DAILY
Refills: 0 | Status: DISCONTINUED | OUTPATIENT
Start: 2020-09-14 | End: 2020-09-15

## 2020-09-14 RX ORDER — ZOLPIDEM TARTRATE 10 MG/1
5 TABLET ORAL AT BEDTIME
Refills: 0 | Status: DISCONTINUED | OUTPATIENT
Start: 2020-09-14 | End: 2020-09-14

## 2020-09-14 RX ORDER — ONDANSETRON 8 MG/1
4 TABLET, FILM COATED ORAL ONCE
Refills: 0 | Status: DISCONTINUED | OUTPATIENT
Start: 2020-09-14 | End: 2020-09-14

## 2020-09-14 RX ORDER — KETOROLAC TROMETHAMINE 30 MG/ML
30 SYRINGE (ML) INJECTION ONCE
Refills: 0 | Status: DISCONTINUED | OUTPATIENT
Start: 2020-09-14 | End: 2020-09-15

## 2020-09-14 RX ORDER — ONDANSETRON 8 MG/1
4 TABLET, FILM COATED ORAL EVERY 6 HOURS
Refills: 0 | Status: DISCONTINUED | OUTPATIENT
Start: 2020-09-14 | End: 2020-09-15

## 2020-09-14 RX ORDER — MAGNESIUM HYDROXIDE 400 MG/1
30 TABLET, CHEWABLE ORAL DAILY
Refills: 0 | Status: DISCONTINUED | OUTPATIENT
Start: 2020-09-14 | End: 2020-09-15

## 2020-09-14 RX ORDER — ASCORBIC ACID 60 MG
500 TABLET,CHEWABLE ORAL
Refills: 0 | Status: DISCONTINUED | OUTPATIENT
Start: 2020-09-14 | End: 2020-09-15

## 2020-09-14 RX ORDER — PANTOPRAZOLE SODIUM 20 MG/1
40 TABLET, DELAYED RELEASE ORAL
Refills: 0 | Status: DISCONTINUED | OUTPATIENT
Start: 2020-09-14 | End: 2020-09-15

## 2020-09-14 RX ORDER — CEFAZOLIN SODIUM 1 G
2000 VIAL (EA) INJECTION EVERY 8 HOURS
Refills: 0 | Status: COMPLETED | OUTPATIENT
Start: 2020-09-14 | End: 2020-09-14

## 2020-09-14 RX ORDER — OXYCODONE HYDROCHLORIDE 5 MG/1
5 TABLET ORAL EVERY 4 HOURS
Refills: 0 | Status: DISCONTINUED | OUTPATIENT
Start: 2020-09-14 | End: 2020-09-15

## 2020-09-14 RX ADMIN — OXYCODONE HYDROCHLORIDE 10 MILLIGRAM(S): 5 TABLET ORAL at 18:34

## 2020-09-14 RX ADMIN — OXYCODONE HYDROCHLORIDE 10 MILLIGRAM(S): 5 TABLET ORAL at 22:49

## 2020-09-14 RX ADMIN — OXYCODONE HYDROCHLORIDE 10 MILLIGRAM(S): 5 TABLET ORAL at 23:49

## 2020-09-14 RX ADMIN — SODIUM CHLORIDE 100 MILLILITER(S): 9 INJECTION, SOLUTION INTRAVENOUS at 10:06

## 2020-09-14 RX ADMIN — Medication 1 TABLET(S): at 13:18

## 2020-09-14 RX ADMIN — TRANEXAMIC ACID 220 MILLIGRAM(S): 100 INJECTION, SOLUTION INTRAVENOUS at 10:16

## 2020-09-14 RX ADMIN — Medication 100 MILLIGRAM(S): at 16:18

## 2020-09-14 RX ADMIN — SODIUM CHLORIDE 100 MILLILITER(S): 9 INJECTION, SOLUTION INTRAVENOUS at 13:18

## 2020-09-14 RX ADMIN — HYDROMORPHONE HYDROCHLORIDE 0.5 MILLIGRAM(S): 2 INJECTION INTRAMUSCULAR; INTRAVENOUS; SUBCUTANEOUS at 23:59

## 2020-09-14 RX ADMIN — OXYCODONE HYDROCHLORIDE 10 MILLIGRAM(S): 5 TABLET ORAL at 19:22

## 2020-09-14 RX ADMIN — Medication 500 MILLIGRAM(S): at 17:24

## 2020-09-14 RX ADMIN — POLYETHYLENE GLYCOL 3350 17 GRAM(S): 17 POWDER, FOR SOLUTION ORAL at 13:18

## 2020-09-14 NOTE — DISCHARGE NOTE PROVIDER - NSDCCPCAREPLAN_GEN_ALL_CORE_FT
PRINCIPAL DISCHARGE DIAGNOSIS  Diagnosis: Osteoarthritis of left shoulder  Assessment and Plan of Treatment:

## 2020-09-14 NOTE — OCCUPATIONAL THERAPY INITIAL EVALUATION ADULT - BALANCE TRAINING, PT EVAL
Pt will increased standing balance from good-/fair+ to good in 3o days to prevent falls, optimize pt's ability for ADL management & safely navigate in all terrains

## 2020-09-14 NOTE — PROGRESS NOTE ADULT - SUBJECTIVE AND OBJECTIVE BOX
75yFemale s/p L reverse TSA POD #0  Pt seen and examined in NAD.   Pain controlled.   Pt denies any new complaints.   Pt denies CP/SOB/N/V/D/numbness/tingling/bowel or bladder dysfunction.     Vitals    PE:   General: NAD, A&Ox3  LUE: Prineo dressing. Arm in sling. Decreases motor/sensation 2/2 to nerve block RP2+  RUE: Skin intact. +ROM shoulder/elbow/wrist/fingers. +ok/thumbsup/fingercross signs.  strength: 5/5.  RP2+ NVI.          A/P: 75yFemale s/p L reverse TSA POD#0  Prineo Dressing  Pain controlled  PT: NWB/No ROM LUE  F/U morning Xray  DVT ppx: SCDs and   Wound care, Isometric exercises, incentive spirometry   Discharge: planning home tomorrow  All the above discussed and understood by pt

## 2020-09-14 NOTE — PHYSICAL THERAPY INITIAL EVALUATION ADULT - ACTIVE RANGE OF MOTION EXAMINATION, REHAB EVAL
bilateral  lower extremity Active ROM was WFL (within functional limits)/Right UE Active ROM was WFL (within functional limits)/L elbow and wrist WFL; shoulder not addressed due to precautions

## 2020-09-14 NOTE — PHYSICAL THERAPY INITIAL EVALUATION ADULT - ADDITIONAL COMMENTS
As per pt, pt lives with  in a private house c 5 steps to enter without rails from the front entrance, however pt states she will be using the garage entrance with 8 steps to enter and R rail up. Pt reports home is a split level home c 4 steps to get to bedroom/bathroom of the home. Bathroom has a walk-in shower, fixed shower head, standard height toilet seat and no grab bars installed. Pt reports prior to admission, she was independent with all functional tasks and ambulating without the use of assistive devices. Pt owns a straight cane in the home that she states she does not use however is in good condition and easily accessible. Pt is right hand dominant, drives and currently is working part-time.

## 2020-09-14 NOTE — OCCUPATIONAL THERAPY INITIAL EVALUATION ADULT - GENERAL OBSERVATIONS, REHAB EVAL
Pt was seen for initial OT consult, encountered on semi supine in bed in NAD with her  at bedside. Left shoulder dressing is clean dry & intact. LUE is supported in gun sling. Post surgical reverse   shoulder precautions  NWB & NO ROM were reviewed and maintained. Pt was AA&Ox4, anxious, but cooperative & followed commands. Pt c/o left shoulder pain& numbness due to s/p left reverse TSA. this limits pt's activity tolerance ,balance, ADL management & functional mobility.

## 2020-09-14 NOTE — OCCUPATIONAL THERAPY INITIAL EVALUATION ADULT - SOCIAL CONCERNS
Complex psychosocial needs/coping issues/Pt voiced concerns about her recovery at home. Pt has a supportive spouse to assist her after discharge home post-operatively.

## 2020-09-14 NOTE — PHYSICAL THERAPY INITIAL EVALUATION ADULT - GENERAL OBSERVATIONS, REHAB EVAL
Pt encountered supine in bed, A&Ox4, +pneumatic TEDs, +IV (removed prior to ambulation), +C/D/I dressing to L shoulder, +L shoulder sling, no complaints of pain/discomfort, NAD and comfortable.

## 2020-09-14 NOTE — DISCHARGE NOTE PROVIDER - NSDCFUADDINST_GEN_ALL_CORE_FT
Sling/Non weight bearing on the operative shoulder. No range of motion to operative shoulder. It is ok to move the elbow/wrist/fingers.     If you have a new onset of numbness or tingling in your arm or hand that was not present before surgery that lasts longer than 24 hours call your surgeon.    Keep Prineo Dressing Clean, Dry and Intact. May shower with Prineo Dressing. Please do not scrub, soak, peel or pick at the prineo dressing. No creams, lotions, or oils over dressing. May shower and let water run over incision, no baths. Pat dry once out of shower. Dressing to be removed in office at follow up visit in 2 weeks. There are no staples or stitches that need to be removed.

## 2020-09-14 NOTE — DISCHARGE NOTE PROVIDER - CARE PROVIDER_API CALL
Sami Navas  ORTHOPAEDIC SURGERY  [address] [place] [address]  Phone: [phone]  Fax: [phone]  Follow Up Time:

## 2020-09-14 NOTE — DISCHARGE NOTE PROVIDER - INSTRUCTIONS
Resume Home Diet     Please Drink Plenty of fluids/water to prevent constipation from pain medications

## 2020-09-14 NOTE — DISCHARGE NOTE PROVIDER - HOSPITAL COURSE
75yFemale with history of Left shoulder OA presenting for Left reverese TSA by Dr. ramon on 9/14/2020. Risk and benefits of surgery were explained to the patient. The patient understood and agreed to proceed with surgery. Patient underwent the procedure with no intraoperative complications. Pt was brought in stable condition to the PACU. Once stable in PACU, pt was brought to the floor. During hospital stay pt was followed by Medicine, physical therapy, Home Care during this admission. Pt had an uneventful hospital course. Pt is stable for discharge to home 75yFemale with history of Left shoulder OA presenting for Left reverese TSA by Dr. ramon on 9/14/2020. Risk and benefits of surgery were explained to the patient. The patient understood and agreed to proceed with surgery. Patient underwent the procedure with no intraoperative complications. Pt was brought in stable condition to the PACU. Once stable in PACU, pt was brought to the floor. During hospital stay pt was followed by Medicine, physical therapy, Home Care during this admission. Pt had an uneventful hospital course. Pt is stable for discharge to home on POD#1.

## 2020-09-14 NOTE — DISCHARGE NOTE PROVIDER - NSDCCPTREATMENT_GEN_ALL_CORE_FT
PRINCIPAL PROCEDURE  Procedure: Reverse total replacement of left shoulder joint  Findings and Treatment:

## 2020-09-14 NOTE — OCCUPATIONAL THERAPY INITIAL EVALUATION ADULT - PATIENT/FAMILY/SIGNIFICANT OTHER GOALS STATEMENT, OT EVAL
Pt would like to be restored to prior level of function and regain full function in her left shoulder.

## 2020-09-14 NOTE — OCCUPATIONAL THERAPY INITIAL EVALUATION ADULT - RANGE OF MOTION EXAMINATION, UPPER EXTREMITY
PROM in left is 0-60, wrist and digits PROM wfl. Pt is unable to actively move, left elbow wrist and hand due to residual numbness from nerve block./Right UE Active ROM was WFL (within functional limits)/Right UE Passive ROM was WFL  (within functional limits)

## 2020-09-14 NOTE — OCCUPATIONAL THERAPY INITIAL EVALUATION ADULT - PERTINENT HX OF CURRENT PROBLEM, REHAB EVAL
Pt is 75 year old female admitted 9/14/2020 for elective surgery for s/p left reverse TSA due to OA, chronic pain and DJD.. Pt denied any  falls in the past 3-6 months

## 2020-09-14 NOTE — OCCUPATIONAL THERAPY INITIAL EVALUATION ADULT - RANGE OF MOTION, PT EVAL
Pt will increase ROM in left elbow, hand and wrist  by 30 degrees to safely and independently perform self care tasks within 30 days .

## 2020-09-14 NOTE — CONSULT NOTE ADULT - SUBJECTIVE AND OBJECTIVE BOX
RUTH LOWENBERG is a 75y Female s/p LEFT TOTAL SHOULDER VS LEFT REVERSE SHOULDER ARTHROPLASTY,BI  by Dr. Navas on 9/14/20. complains of postop pain; patient tolerated surgery well.      PMH: w/ h/o Pain due to other internal prosthetic device, implant, or graft  Migraines  OA (osteoarthritis)    ROS: no fevers, chills, headache, dizziness, lightheadedness, chest pain, palpitations, shortness of breath, cough, phlegm, wheezing, abdominal pain, nausea, vomiting, diarrhea, constipation or urinary symptoms     PSH: H/O bilateral oophorectomy  S/P hip replacement, right  H/O unilateral oophorectomy    FH: leukemia    SH: does not smoke or drink at this time    ALLERGIES: adhesives (Rash)  Metal allergies, Zinc in jewlery (Rash)  No Known Drug Allergies    MEDS: acetaminophen   Tablet .. 650 milliGRAM(s) Oral every 6 hours PRN  aluminum hydroxide/magnesium hydroxide/simethicone Suspension 30 milliLiter(s) Oral four times a day PRN  ascorbic acid 500 milliGRAM(s) Oral two times a day  ceFAZolin   IVPB 2000 milliGRAM(s) IV Intermittent every 8 hours  diphenhydrAMINE 25 milliGRAM(s) Oral at bedtime PRN  HYDROmorphone  Injectable 0.5 milliGRAM(s) IV Push every 4 hours PRN  HYDROmorphone  Injectable 0.5 milliGRAM(s) IV Push every 10 minutes PRN  ketorolac   Injectable 30 milliGRAM(s) IV Push once PRN  lactated ringers. 1000 milliLiter(s) IV Continuous <Continuous>  magnesium hydroxide Suspension 30 milliLiter(s) Oral daily PRN  multivitamin 1 Tablet(s) Oral daily  ondansetron Injectable 4 milliGRAM(s) IV Push every 6 hours PRN  oxyCODONE    IR 5 milliGRAM(s) Oral every 4 hours PRN  oxyCODONE    IR 10 milliGRAM(s) Oral every 4 hours PRN  pantoprazole    Tablet 40 milliGRAM(s) Oral before breakfast  petrolatum white Ointment 1 Application(s) Topical three times a day PRN  polyethylene glycol 3350 17 Gram(s) Oral daily  senna 2 Tablet(s) Oral at bedtime PRN  SUMAtriptan 50 milliGRAM(s) Oral daily PRN    PHYS: T(C): 36.8 (09-14-20 @ 14:23), Max: 36.8 (09-14-20 @ 13:09)  HR: 67 (09-14-20 @ 15:13) (54 - 80)  BP: 128/57 (09-14-20 @ 15:13) (113/68 - 140/76)  RR: 16 (09-14-20 @ 14:23) (14 - 20)  SpO2: 95% (09-14-20 @ 15:13) (93% - 99%)  HEENT unremarkable  neck no JVD or bruit  lungs, clear bilaterally  heart, regular rhythm, normal S1, S2, no murmurs, rubs or gallops  abdomen, soft, non tender, no organomegaly, normal bowel sounds  no cyanosis, clubbing, edema or calf tenderness  neuro, grossly normal      Assessment and Plan: status post left total shoulder replacement; history of migraine; pain control; deep vein thrombophlebitis prophylaxis; physical therapy; bowel regimen; nutrition support; follow up labs; will follow.

## 2020-09-14 NOTE — PHYSICAL THERAPY INITIAL EVALUATION ADULT - CRITERIA FOR SKILLED THERAPEUTIC INTERVENTIONS
anticipated discharge recommendation/impairments found/risk reduction/prevention/functional limitations in following categories

## 2020-09-14 NOTE — OCCUPATIONAL THERAPY INITIAL EVALUATION ADULT - ADDITIONAL COMMENTS
Prior to admission, Pt was functioning in her roles, self sufficient & ambulating independently without any assistive devices. Presently, pt needs assistance with upper body self care tasks due to pain, weakness, stiffness and decreased ROM from left reverse TSA with surgical precautions. Pt is right hand dominant and wears glasses for reading.

## 2020-09-14 NOTE — PHYSICAL THERAPY INITIAL EVALUATION ADULT - STRENGTHENING, PT EVAL
Patient will improve strength in L shoulder to 5/5 6-8 weeks to improve overall functional mobility including gait, transfers, bed mobility and decrease risk of falls.

## 2020-09-14 NOTE — PHYSICAL THERAPY INITIAL EVALUATION ADULT - GAIT TRAINING, PT EVAL
Patient will ambulate 500 feet without assistive device independently for community ambulation in 2-3 days. Patient will ascend/descend 12 steps with R rail up/R rail down independently in 2-3 days to safely navigate home environment.

## 2020-09-14 NOTE — OCCUPATIONAL THERAPY INITIAL EVALUATION ADULT - LIVES WITH, PROFILE
in a private house with no step to enter from the garage and 12 step inside to the main floor with left ascending hand rail.  Pt has another flight of stairs with right ascending hand rail, to access the bedroom and bathroom. The bathroom has a  stall shower without grab bars , fixed shower head and standard toilet./spouse spouse/in a private house with no step to enter from the garage and 12 steps inside to the main floor with left ascending hand rail. Pt has another flight of stairs with right ascending hand rail, to access the bedroom and bathroom. The bathroom has a  stall shower without grab bars , fixed shower head and standard toilet.

## 2020-09-14 NOTE — DISCHARGE NOTE PROVIDER - NSDCMRMEDTOKEN_GEN_ALL_CORE_FT
acetaminophen 325 mg oral tablet: 3 tab(s) orally 3 times a day MDD:9 tabs  calcium citrate: 800 milligram(s) orally once a day  diclofenac sodium 75 mg oral delayed release tablet: 1 tab(s) orally 2 times a day  Fosamax 70 mg oral tablet: 1 tab(s) orally once a week  Imitrex 100 mg oral tablet: 0.5 tab(s) orally once a day, As Needed  Multiple Vitamins oral tablet: 1 tab(s) orally once a day   acetaminophen 325 mg oral tablet: 3 tab(s) orally 3 times a day MDD:9 tabs  ascorbic acid 500 mg oral tablet: 1 tab(s) orally 2 times a day  aspirin 325 mg oral delayed release tablet: 1 tab(s) orally once a day MDD:1  calcium citrate: 800 milligram(s) orally once a day  Fosamax 70 mg oral tablet: 1 tab(s) orally once a week  Imitrex 100 mg oral tablet: 0.5 tab(s) orally once a day, As Needed  magnesium hydroxide 8% oral suspension: 30 milliliter(s) orally once a day, As needed, Constipation  Multiple Vitamins oral tablet: 1 tab(s) orally once a day  oxyCODONE 10 mg oral tablet: 1 tab(s) orally every 4 hours, As needed, pain (6-10) MDD:6  pantoprazole 40 mg oral delayed release tablet: 1 tab(s) orally once a day (before a meal) MDD:1  polyethylene glycol 3350 oral powder for reconstitution: 17 gram(s) orally once a day  senna oral tablet: 2 tab(s) orally once a day (at bedtime), As needed, Constipation

## 2020-09-14 NOTE — PHYSICAL THERAPY INITIAL EVALUATION ADULT - BALANCE TRAINING, PT EVAL
Patient will be normal in static and dynamic standing balance without assistive device  in 2-3 days to improve safety and decrease risk of falls.

## 2020-09-14 NOTE — PHYSICAL THERAPY INITIAL EVALUATION ADULT - PLANNED THERAPY INTERVENTIONS, PT EVAL
3/28/18 2:30 PM    Attempted phone call with patient. Patient did not answer. Unable to leave a voicemail.   [facility]
transfer training/bed mobility training/strengthening/balance training/gait training

## 2020-09-14 NOTE — DISCHARGE NOTE PROVIDER - NSDCFUADDAPPT_GEN_ALL_CORE_FT
Follow up with your surgeon in two weeks. Call for appointment.  If you need more pain medication, call your surgeon's office. For medication refills or authorizations, please call [phone] xt [phone]  We recommend that you call and schedule a follow up appointment with your primary care physician for repeat blood work (CBC and BMP) for post hospital discharge follow-up care.  Call your surgeon if you have increased redness/pain/drainage or fever. Return to ER for shortness of breath/calf tenderness.

## 2020-09-15 ENCOUNTER — TRANSCRIPTION ENCOUNTER (OUTPATIENT)
Age: 75
End: 2020-09-15

## 2020-09-15 VITALS
HEART RATE: 68 BPM | SYSTOLIC BLOOD PRESSURE: 102 MMHG | TEMPERATURE: 98 F | OXYGEN SATURATION: 96 % | DIASTOLIC BLOOD PRESSURE: 64 MMHG | RESPIRATION RATE: 16 BRPM

## 2020-09-15 LAB
ANION GAP SERPL CALC-SCNC: 5 MMOL/L — SIGNIFICANT CHANGE UP (ref 5–17)
BUN SERPL-MCNC: 18 MG/DL — SIGNIFICANT CHANGE UP (ref 7–23)
CALCIUM SERPL-MCNC: 8.2 MG/DL — LOW (ref 8.5–10.1)
CHLORIDE SERPL-SCNC: 108 MMOL/L — SIGNIFICANT CHANGE UP (ref 96–108)
CO2 SERPL-SCNC: 27 MMOL/L — SIGNIFICANT CHANGE UP (ref 22–31)
CREAT SERPL-MCNC: 0.75 MG/DL — SIGNIFICANT CHANGE UP (ref 0.5–1.3)
GLUCOSE SERPL-MCNC: 96 MG/DL — SIGNIFICANT CHANGE UP (ref 70–99)
HCT VFR BLD CALC: 32.1 % — LOW (ref 34.5–45)
HGB BLD-MCNC: 10.4 G/DL — LOW (ref 11.5–15.5)
MCHC RBC-ENTMCNC: 30.1 PG — SIGNIFICANT CHANGE UP (ref 27–34)
MCHC RBC-ENTMCNC: 32.4 GM/DL — SIGNIFICANT CHANGE UP (ref 32–36)
MCV RBC AUTO: 93 FL — SIGNIFICANT CHANGE UP (ref 80–100)
NRBC # BLD: 0 /100 WBCS — SIGNIFICANT CHANGE UP (ref 0–0)
PLATELET # BLD AUTO: 141 K/UL — LOW (ref 150–400)
POTASSIUM SERPL-MCNC: 4.1 MMOL/L — SIGNIFICANT CHANGE UP (ref 3.5–5.3)
POTASSIUM SERPL-SCNC: 4.1 MMOL/L — SIGNIFICANT CHANGE UP (ref 3.5–5.3)
RBC # BLD: 3.45 M/UL — LOW (ref 3.8–5.2)
RBC # FLD: 12.9 % — SIGNIFICANT CHANGE UP (ref 10.3–14.5)
SODIUM SERPL-SCNC: 140 MMOL/L — SIGNIFICANT CHANGE UP (ref 135–145)
WBC # BLD: 7.2 K/UL — SIGNIFICANT CHANGE UP (ref 3.8–10.5)
WBC # FLD AUTO: 7.2 K/UL — SIGNIFICANT CHANGE UP (ref 3.8–10.5)

## 2020-09-15 PROCEDURE — 73030 X-RAY EXAM OF SHOULDER: CPT | Mod: 26,LT

## 2020-09-15 RX ORDER — PANTOPRAZOLE SODIUM 20 MG/1
1 TABLET, DELAYED RELEASE ORAL
Qty: 14 | Refills: 0
Start: 2020-09-15 | End: 2020-09-28

## 2020-09-15 RX ORDER — MAGNESIUM HYDROXIDE 400 MG/1
30 TABLET, CHEWABLE ORAL
Qty: 0 | Refills: 0 | DISCHARGE
Start: 2020-09-15

## 2020-09-15 RX ORDER — ASPIRIN/CALCIUM CARB/MAGNESIUM 324 MG
1 TABLET ORAL
Qty: 14 | Refills: 0
Start: 2020-09-15 | End: 2020-09-28

## 2020-09-15 RX ORDER — OXYCODONE HYDROCHLORIDE 5 MG/1
1 TABLET ORAL
Qty: 30 | Refills: 0
Start: 2020-09-15 | End: 2020-09-19

## 2020-09-15 RX ORDER — SENNA PLUS 8.6 MG/1
2 TABLET ORAL
Qty: 0 | Refills: 0 | DISCHARGE
Start: 2020-09-15

## 2020-09-15 RX ORDER — ASCORBIC ACID 60 MG
1 TABLET,CHEWABLE ORAL
Qty: 0 | Refills: 0 | DISCHARGE
Start: 2020-09-15

## 2020-09-15 RX ORDER — DICLOFENAC SODIUM 75 MG/1
1 TABLET, DELAYED RELEASE ORAL
Qty: 0 | Refills: 0 | DISCHARGE

## 2020-09-15 RX ORDER — ACETAMINOPHEN 500 MG
1000 TABLET ORAL ONCE
Refills: 0 | Status: COMPLETED | OUTPATIENT
Start: 2020-09-15 | End: 2020-09-15

## 2020-09-15 RX ORDER — POLYETHYLENE GLYCOL 3350 17 G/17G
17 POWDER, FOR SOLUTION ORAL
Qty: 0 | Refills: 0 | DISCHARGE
Start: 2020-09-15

## 2020-09-15 RX ADMIN — Medication 1 TABLET(S): at 12:17

## 2020-09-15 RX ADMIN — POLYETHYLENE GLYCOL 3350 17 GRAM(S): 17 POWDER, FOR SOLUTION ORAL at 12:17

## 2020-09-15 RX ADMIN — Medication 30 MILLIGRAM(S): at 01:37

## 2020-09-15 RX ADMIN — HYDROMORPHONE HYDROCHLORIDE 0.5 MILLIGRAM(S): 2 INJECTION INTRAMUSCULAR; INTRAVENOUS; SUBCUTANEOUS at 00:15

## 2020-09-15 RX ADMIN — Medication 400 MILLIGRAM(S): at 10:35

## 2020-09-15 RX ADMIN — Medication 325 MILLIGRAM(S): at 12:17

## 2020-09-15 RX ADMIN — Medication 1000 MILLIGRAM(S): at 11:00

## 2020-09-15 RX ADMIN — PANTOPRAZOLE SODIUM 40 MILLIGRAM(S): 20 TABLET, DELAYED RELEASE ORAL at 05:53

## 2020-09-15 RX ADMIN — Medication 100 MILLIGRAM(S): at 00:02

## 2020-09-15 RX ADMIN — Medication 30 MILLIGRAM(S): at 02:00

## 2020-09-15 RX ADMIN — Medication 500 MILLIGRAM(S): at 05:48

## 2020-09-15 RX ADMIN — OXYCODONE HYDROCHLORIDE 10 MILLIGRAM(S): 5 TABLET ORAL at 05:51

## 2020-09-15 NOTE — PROGRESS NOTE ADULT - SUBJECTIVE AND OBJECTIVE BOX
75yFemale s/p left reverse TSA POD#1. Pt seen and examined in NAD. Pain is now controlled. Pt denies any new complaints. Pt denies CP/SOB/N/V/D/numbness/tingling/bowel or bladder dysfunction.     PE:   Neuro: AAOX3  LUE: Prineo dressing C/D/I. Sling in place. +ROM elbow/wrist/fingers. +ok/thumbsup/fingercross signs.  strength: 5/5.  RP2+ NVI.  RUE: Skin intact. +ROM shoulder/elbow/wrist/fingers. +ok/thumbsup/fingercross signs.  strength: 5/5.  RP2+ NVI.   B/L LE: Skin intact. +ROM hip/knee/ankle/toes. Ankle Dorsi/plantarflexion: 5/5. Calf: soft, compressible and nontender. DP/PT 2+ NVI.                             10.4   7.20  )-----------( 141      ( 15 Sep 2020 06:40 )             32.1       09-15    140  |  108  |  18  ----------------------------<  96  4.1   |  27  |  0.75    Ca    8.2<L>      15 Sep 2020 06:40          A/P: 75yFemale s/p left reverse TSA POD#1  Pain controlled: Oxy 10 and toradol  PT/OT; NWB LUE no ROM to shoulder  DVT ppx: SCDs, ASA 325mg daily  Wound care, Isometric exercises, incentive spirometry   Medical consult appreciated  Discharge: planning home today, [facility] rx's - will resubmit Rx's to [facility].   F/U AM Xray  All the above discussed and understood by pt

## 2020-09-15 NOTE — DISCHARGE NOTE NURSING/CASE MANAGEMENT/SOCIAL WORK - NSDCFUADDAPPT_GEN_ALL_CORE_FT
Follow up with your surgeon in two weeks. Call for appointment.  If you need more pain medication, call your surgeon's office. For medication refills or authorizations, please call [phone] xt [phone]  We recommend that you call and schedule a follow up appointment with your primary care physician for repeat blood work (CBC and BMP) for post hospital discharge follow-up care.  Call your surgeon if you have increased redness/pain/drainage or fever. Return to ER for shortness of breath/calf tenderness.

## 2020-09-15 NOTE — DISCHARGE NOTE NURSING/CASE MANAGEMENT/SOCIAL WORK - PATIENT PORTAL LINK FT
You can access the FollowMyHealth Patient Portal offered by [facility] by registering at the following website: [email or web address]. By joining [employer] [facility]’s FollowMyHealth portal, you will also be able to view your health information using other applications (apps) compatible with our system.

## 2020-09-24 DIAGNOSIS — M19.012 PRIMARY OSTEOARTHRITIS, LEFT SHOULDER: ICD-10-CM

## 2020-09-24 DIAGNOSIS — M81.0 AGE-RELATED OSTEOPOROSIS WITHOUT CURRENT PATHOLOGICAL FRACTURE: ICD-10-CM

## 2020-09-24 DIAGNOSIS — M75.101 UNSPECIFIED ROTATOR CUFF TEAR OR RUPTURE OF RIGHT SHOULDER, NOT SPECIFIED AS TRAUMATIC: ICD-10-CM

## 2020-09-24 DIAGNOSIS — G43.909 MIGRAINE, UNSPECIFIED, NOT INTRACTABLE, WITHOUT STATUS MIGRAINOSUS: ICD-10-CM

## 2020-09-24 DIAGNOSIS — Z96.641 PRESENCE OF RIGHT ARTIFICIAL HIP JOINT: ICD-10-CM

## 2020-10-20 NOTE — PATIENT PROFILE ADULT - NSPROCHRONICPAINRELIEVE_GEN_A_NUR
Chief Complaint   Patient presents with   • IUD       History of Present Illness:  Carisa is a 40 year old female,       is seen today for removal of Nexplanon and placement of Mirena.  She is tired of all the breakthrough bleeding she has had on the Nexplanon.    She is also complaining of feeling depressed and is requesting a referral to a therapist in Behavioral Health.  I had her fill out a PHQ -9 inventory and she scores 14. She states she feels down depressed and hopeless nearly every day.  She has no thoughts of harming herself whatsoever.  She is sleeping well once she gets to sleep and only occasionally has trouble getting to sleep.  Her appetite is okay.  She has no sex drive.  She just feels as if she does not really want to do anything.  She is amenable to trying medication.  She has never had depression before.  She has never been on an antidepressant before.  Her it decreased interest in sex is causing some marital problems..    Patient's last menstrual period was 01/15/2020 (lmp unknown).  Contraception:  Nexplanon, now starting Mirena    I have reviewed the patient's medications and allergies, past medical, surgical, social and obstetrical history, updating these as appropriate.  See Histories section of the EMR (electronic medical record) for a display of this information.    PHYSICAL EXAM:  Blood pressure 130/64, height 5' 5\" (1.651 m), weight 74.5 kg, last menstrual period 01/15/2020. Body mass index is 27.32 kg/m².  General:  Well developed, well nourished, in no acute distress.  Psychiatric:  Alert and cooperative; normal mood and affect.    Pelvic Exam:    Carisa Alves comes in today to have a Mirena  IUD (intrauterine device) inserted.  She has read the entire patient consent form and questions have been answered.  She is aware of and accepts the risks of bleeding, infection and uterine perforation.  Common side effects have also been discussed, especially the side effect of  breakthrough bleeding for the first several months.  Other methods of nonpermanent contraception have also been discussed and she has decided to proceed with the IUD.     Bimanual exam is performed and the uterus is noted to be anteverted.  Speculum was placed and the cervix was cleansed with Betadine.  A paracervical block not placed.  The anterior lip of the cervix was grasped with a tenaculum and the uterus was sounded to 7.5 cm.  The IUD was placed with no difficulty whatsoever.  The string was trimmed to within 3 cm of the cervix.  Patient tolerated the procedure well and was discharged home in stable condition.  She was instructed to check for the string in 4 weeks' time and to return here for an IUD check if unable to feel the string.      ASSESSMENT:    Mirena IUD placed.  Nexplanon removed as per below.    PLAN:      Prescription for bupropion  mg.  The risks benefits and common side effects were discussed with her.  The fact that she could not expect to feel better for 3-4 weeks was discussed.  She is referred to Behavioral Health for a therapist.  She will return in 5 weeks for medication follow-up.        Carisa is here for Nexplanon removal.  It had been inserted on 2 years ago.  The reason for the removal is : irregular bleeding.    The Nexplanon implant was palpated in the upper, inner left arm.  The area was prepped with Betadine and draped.  1 mL of 1% xylocaine was injected underneath the distal end of the implant and a 3 mm incision made over this area.    Pressure was placed on the proximal end of the implant until the distal end could be grasped with curved clamps and removed.  It was inspected and found to be intact, measuring 4 cm.  The incision was then closed with a Steri-strip and an adhesive bandage.  A pressure bandage was then applied.      There were no complications, and the patient tolerated the procedure well.  She was advised to call if she experienced any significant pain,  redness or swelling of the removal site.    Contraception from this point on will be Mirena IUD.           none

## 2020-11-20 NOTE — PHYSICAL THERAPY INITIAL EVALUATION ADULT - LEVEL OF INDEPENDENCE: SCOOT/BRIDGE, REHAB EVAL
"  Pre Op Diagnosis: History of dvt and need for hepatic transplant  Post Op Diagnosis: Same    Procedure: IVC filter placement    Procedure performed by: Nickolas    Written Informed Consent Obtained: Yes  Specimen Removed: NO  Estimated Blood Loss: Minimal    Findings:   Successful placement of option elite filter, which is retrievable.    Patient tolerated procedure well.    Faisal Olmos MD ([place])  Interventional Radiology  [phone]        " supervision

## 2021-01-31 ENCOUNTER — NON-APPOINTMENT (OUTPATIENT)
Age: 76
End: 2021-01-31

## 2021-02-12 NOTE — PRE-OP CHECKLIST - NS PREOP CHK CHLOROHEX WASH
in [facility]SU:
PCP Dr Lubin, [facility]    Hematology/Oncology [place] [facility] [phone]

## 2021-02-18 ENCOUNTER — OUTPATIENT (OUTPATIENT)
Dept: OUTPATIENT SERVICES | Facility: HOSPITAL | Age: 76
LOS: 1 days | Discharge: HOME | End: 2021-02-18
Payer: MEDICARE

## 2021-02-18 VITALS
SYSTOLIC BLOOD PRESSURE: 124 MMHG | HEART RATE: 64 BPM | RESPIRATION RATE: 16 BRPM | TEMPERATURE: 98 F | WEIGHT: 138.89 LBS | DIASTOLIC BLOOD PRESSURE: 65 MMHG | HEIGHT: 66 IN | OXYGEN SATURATION: 98 %

## 2021-02-18 DIAGNOSIS — Z96.641 PRESENCE OF RIGHT ARTIFICIAL HIP JOINT: Chronic | ICD-10-CM

## 2021-02-18 DIAGNOSIS — M19.011 PRIMARY OSTEOARTHRITIS, RIGHT SHOULDER: ICD-10-CM

## 2021-02-18 DIAGNOSIS — G43.909 MIGRAINE, UNSPECIFIED, NOT INTRACTABLE, WITHOUT STATUS MIGRAINOSUS: ICD-10-CM

## 2021-02-18 DIAGNOSIS — Z90.722 ACQUIRED ABSENCE OF OVARIES, BILATERAL: Chronic | ICD-10-CM

## 2021-02-18 DIAGNOSIS — Z01.818 ENCOUNTER FOR OTHER PREPROCEDURAL EXAMINATION: ICD-10-CM

## 2021-02-18 DIAGNOSIS — Z96.612 PRESENCE OF LEFT ARTIFICIAL SHOULDER JOINT: Chronic | ICD-10-CM

## 2021-02-18 DIAGNOSIS — Z96.649 PRESENCE OF UNSPECIFIED ARTIFICIAL HIP JOINT: Chronic | ICD-10-CM

## 2021-02-18 LAB
A1C WITH ESTIMATED AVERAGE GLUCOSE RESULT: 5.1 % — SIGNIFICANT CHANGE UP (ref 4–5.6)
ANION GAP SERPL CALC-SCNC: 5 MMOL/L — SIGNIFICANT CHANGE UP (ref 5–17)
APTT BLD: 29.9 SEC — SIGNIFICANT CHANGE UP (ref 27.5–35.5)
BASOPHILS # BLD AUTO: 0.05 K/UL — SIGNIFICANT CHANGE UP (ref 0–0.2)
BASOPHILS NFR BLD AUTO: 1.2 % — SIGNIFICANT CHANGE UP (ref 0–2)
BUN SERPL-MCNC: 22 MG/DL — SIGNIFICANT CHANGE UP (ref 7–23)
CALCIUM SERPL-MCNC: 9.2 MG/DL — SIGNIFICANT CHANGE UP (ref 8.5–10.1)
CHLORIDE SERPL-SCNC: 110 MMOL/L — HIGH (ref 96–108)
CO2 SERPL-SCNC: 29 MMOL/L — SIGNIFICANT CHANGE UP (ref 22–31)
CREAT SERPL-MCNC: 0.7 MG/DL — SIGNIFICANT CHANGE UP (ref 0.5–1.3)
EOSINOPHIL # BLD AUTO: 0.15 K/UL — SIGNIFICANT CHANGE UP (ref 0–0.5)
EOSINOPHIL NFR BLD AUTO: 3.5 % — SIGNIFICANT CHANGE UP (ref 0–6)
ESTIMATED AVERAGE GLUCOSE: 100 MG/DL — SIGNIFICANT CHANGE UP (ref 68–114)
GLUCOSE SERPL-MCNC: 69 MG/DL — LOW (ref 70–99)
HCT VFR BLD CALC: 39.8 % — SIGNIFICANT CHANGE UP (ref 34.5–45)
HGB BLD-MCNC: 13.1 G/DL — SIGNIFICANT CHANGE UP (ref 11.5–15.5)
IMM GRANULOCYTES NFR BLD AUTO: 0.2 % — SIGNIFICANT CHANGE UP (ref 0–1.5)
INR BLD: 1.02 RATIO — SIGNIFICANT CHANGE UP (ref 0.88–1.16)
LYMPHOCYTES # BLD AUTO: 1.6 K/UL — SIGNIFICANT CHANGE UP (ref 1–3.3)
LYMPHOCYTES # BLD AUTO: 37.2 % — SIGNIFICANT CHANGE UP (ref 13–44)
MCHC RBC-ENTMCNC: 30.5 PG — SIGNIFICANT CHANGE UP (ref 27–34)
MCHC RBC-ENTMCNC: 32.9 GM/DL — SIGNIFICANT CHANGE UP (ref 32–36)
MCV RBC AUTO: 92.6 FL — SIGNIFICANT CHANGE UP (ref 80–100)
MONOCYTES # BLD AUTO: 0.47 K/UL — SIGNIFICANT CHANGE UP (ref 0–0.9)
MONOCYTES NFR BLD AUTO: 10.9 % — SIGNIFICANT CHANGE UP (ref 2–14)
MRSA PCR RESULT.: SIGNIFICANT CHANGE UP
NEUTROPHILS # BLD AUTO: 2.02 K/UL — SIGNIFICANT CHANGE UP (ref 1.8–7.4)
NEUTROPHILS NFR BLD AUTO: 47 % — SIGNIFICANT CHANGE UP (ref 43–77)
NRBC # BLD: 0 /100 WBCS — SIGNIFICANT CHANGE UP (ref 0–0)
PLATELET # BLD AUTO: 169 K/UL — SIGNIFICANT CHANGE UP (ref 150–400)
POTASSIUM SERPL-MCNC: 4.4 MMOL/L — SIGNIFICANT CHANGE UP (ref 3.5–5.3)
POTASSIUM SERPL-SCNC: 4.4 MMOL/L — SIGNIFICANT CHANGE UP (ref 3.5–5.3)
PROTHROM AB SERPL-ACNC: 11.8 SEC — SIGNIFICANT CHANGE UP (ref 10.6–13.6)
RBC # BLD: 4.3 M/UL — SIGNIFICANT CHANGE UP (ref 3.8–5.2)
RBC # FLD: 13.2 % — SIGNIFICANT CHANGE UP (ref 10.3–14.5)
S AUREUS DNA NOSE QL NAA+PROBE: SIGNIFICANT CHANGE UP
SODIUM SERPL-SCNC: 144 MMOL/L — SIGNIFICANT CHANGE UP (ref 135–145)
WBC # BLD: 4.3 K/UL — SIGNIFICANT CHANGE UP (ref 3.8–10.5)
WBC # FLD AUTO: 4.3 K/UL — SIGNIFICANT CHANGE UP (ref 3.8–10.5)

## 2021-02-18 PROCEDURE — 93010 ELECTROCARDIOGRAM REPORT: CPT

## 2021-02-18 NOTE — H&P PST ADULT - HISTORY OF PRESENT ILLNESS
75F pmh migraines, osteoporosis c/o right  shoulder pain 2/2 primary osteoarthritis - scheduled for right shoulder reverse arthroplasty  this patient denies any fever, cough, sob flu like symptoms, has traveled to [place] in the past 30 days

## 2021-02-18 NOTE — H&P PST ADULT - NSICDXPROBLEM_GEN_ALL_CORE_FT
PROBLEM DIAGNOSES  Problem: Osteoarthritis of right shoulder  Assessment and Plan: scheduled for right shoulder reverse arthroplasty    Problem: Migraine  Assessment and Plan: continue meds      Problem: Preoperative examination  Assessment and Plan: labs - cbc,pt/ptt,bmp,t&s,nose cx,ekg  M/C required  preop 3 day hibiclens instruction reviewed and given .instructed on if  nose cx positive use mupuricin 5 days and checklist given  take routine meds DOS with sips of water. avoid NSAID and OTC supplements. verbalized understanding  information on proper nutrition , increase protein and better food choices provided in packet  patient instructed on having covid19 swab 3 days prior to surgery

## 2021-02-18 NOTE — H&P PST ADULT - NSICDXPASTMEDICALHX_GEN_ALL_CORE_FT
PAST MEDICAL HISTORY:  Migraines     OA (osteoarthritis)     Pain due to other internal prosthetic device, implant, or graft

## 2021-02-18 NOTE — H&P PST ADULT - NSICDXPASTSURGICALHX_GEN_ALL_CORE_FT
PAST SURGICAL HISTORY:  H/O bilateral oophorectomy (2009)    History of arthroplasty of left shoulder     S/P hip replacement, right (4/2019)    S/P revision of total hip

## 2021-02-18 NOTE — H&P PST ADULT - ASSESSMENT
right shoulder osteoarthritis  CAPRINI SCORE    AGE RELATED RISK FACTORS                                                       MOBILITY RELATED FACTORS  [ ] Age 41-60 years                                            (1 Point)                  [ ] Bed rest                                                        (1 Point)  [ ] Age: 61-74 years                                           (2 Points)                [ ] Plaster cast                                                   (2 Points)  [x ] Age= 75 years                                              (3 Points)                 [ ] Bed bound for more than 72 hours                   (2 Points)    DISEASE RELATED RISK FACTORS                                               GENDER SPECIFIC FACTORS  [ ] Edema in the lower extremities                       (1 Point)                  [ ] Pregnancy                                                     (1 Point)  [ ] Varicose veins                                               (1 Point)                  [ ] Post-partum < 6 weeks                                   (1 Point)             x[ ] BMI > 25 Kg/m2                                            (1 Point)                  [ ] Hormonal therapy  or oral contraception            (1 Point)                 [ ] Sepsis (in the previous month)                        (1 Point)                  [ ] History of pregnancy complications  [ ] Pneumonia or serious lung disease                                               [ ] Unexplained or recurrent                       (1 Point)           (in the previous month)                               (1 Point)  [ ] Abnormal pulmonary function test                     (1 Point)                 SURGERY RELATED RISK FACTORS  [ ] Acute myocardial infarction                              (1 Point)                 [ ]  Section                                            (1 Point)  [ ] Congestive heart failure (in the previous month)  (1 Point)                 [ ] Minor surgery                                                 (1 Point)   [ ] Inflammatory bowel disease                             (1 Point)                 [ ] Arthroscopic surgery                                        (2 Points)  [ ] Central venous access                                    (2 Points)                [ ] General surgery lasting more than 45 minutes   (2 Points)       [ ] Stroke (in the previous month)                          (5 Points)               [x ] Elective arthroplasty                                        (5 Points)                                                                                                                                               HEMATOLOGY RELATED FACTORS                                                 TRAUMA RELATED RISK FACTORS  [ ] Prior episodes of VTE                                     (3 Points)                 [ ] Fracture of the hip, pelvis, or leg                       (5 Points)  [ ] Positive family history for VTE                         (3 Points)                 [ ] Acute spinal cord injury (in the previous month)  (5 Points)  [ ] Prothrombin 42789 A                                      (3 Points)                 [ ] Paralysis  (less than 1 month)                          (5 Points)  [ ] Factor V Leiden                                             (3 Points)                 [ ] Multiple Trauma within 1 month                         (5 Points)  [ ] Lupus anticoagulants                                     (3 Points)                                                           [ ] Anticardiolipin antibodies                                (3 Points)                                                       [ ] High homocysteine in the blood                      (3 Points)                                             [ ] Other congenital or acquired thrombophilia       (3 Points)                                                [ ] Heparin induced thrombocytopenia                  (3 Points)                                          Total Score [        9  ]  Caprini Score 0-2: Low risk, No VTE Prophylaxis required for most patient's, encourage ambulation  Caprini Score 3-6: At Risk, Pharmacologic VTE prophylaxis is indicated for most patients ( in the absence of a contraindication)  Caprini Score Greater than or = 7: High Risk , pharmacologic VTE is indicated for most patients ( in the absence of a contraindication)    Caprini score indicates that the patient is high risk for VTE event ( score 6 or greater). Surgical patient's in this group will benefit from both pharmacologic prophylaxis and intermittent compression devices . Surgical team will determine the balance between VTE  risk and bleeding risk and other clinical considerations

## 2021-02-26 ENCOUNTER — APPOINTMENT (OUTPATIENT)
Dept: DISASTER EMERGENCY | Facility: CLINIC | Age: 76
End: 2021-02-26

## 2021-02-26 DIAGNOSIS — Z01.818 ENCOUNTER FOR OTHER PREPROCEDURAL EXAMINATION: ICD-10-CM

## 2021-02-27 LAB — SARS-COV-2 N GENE NPH QL NAA+PROBE: NOT DETECTED

## 2021-02-28 ENCOUNTER — TRANSCRIPTION ENCOUNTER (OUTPATIENT)
Age: 76
End: 2021-02-28

## 2021-03-01 ENCOUNTER — OUTPATIENT (OUTPATIENT)
Dept: OUTPATIENT SERVICES | Facility: HOSPITAL | Age: 76
LOS: 1 days | Discharge: HOME | End: 2021-03-01

## 2021-03-01 ENCOUNTER — TRANSCRIPTION ENCOUNTER (OUTPATIENT)
Age: 76
End: 2021-03-01

## 2021-03-01 ENCOUNTER — RESULT REVIEW (OUTPATIENT)
Age: 76
End: 2021-03-01

## 2021-03-01 DIAGNOSIS — Z96.649 PRESENCE OF UNSPECIFIED ARTIFICIAL HIP JOINT: Chronic | ICD-10-CM

## 2021-03-01 DIAGNOSIS — Z96.641 PRESENCE OF RIGHT ARTIFICIAL HIP JOINT: Chronic | ICD-10-CM

## 2021-03-01 DIAGNOSIS — Z90.79 ACQUIRED ABSENCE OF OTHER GENITAL ORGAN(S): ICD-10-CM

## 2021-03-01 DIAGNOSIS — G40.909 EPILEPSY, UNSPECIFIED, NOT INTRACTABLE, WITHOUT STATUS EPILEPTICUS: ICD-10-CM

## 2021-03-01 DIAGNOSIS — M66.321 SPONTANEOUS RUPTURE OF FLEXOR TENDONS, RIGHT UPPER ARM: ICD-10-CM

## 2021-03-01 DIAGNOSIS — E78.5 HYPERLIPIDEMIA, UNSPECIFIED: ICD-10-CM

## 2021-03-01 DIAGNOSIS — Z90.710 ACQUIRED ABSENCE OF BOTH CERVIX AND UTERUS: ICD-10-CM

## 2021-03-01 DIAGNOSIS — M75.101 UNSPECIFIED ROTATOR CUFF TEAR OR RUPTURE OF RIGHT SHOULDER, NOT SPECIFIED AS TRAUMATIC: ICD-10-CM

## 2021-03-01 DIAGNOSIS — Z96.612 PRESENCE OF LEFT ARTIFICIAL SHOULDER JOINT: Chronic | ICD-10-CM

## 2021-03-01 DIAGNOSIS — M19.011 PRIMARY OSTEOARTHRITIS, RIGHT SHOULDER: ICD-10-CM

## 2021-03-01 DIAGNOSIS — Z90.722 ACQUIRED ABSENCE OF OVARIES, BILATERAL: ICD-10-CM

## 2021-03-01 DIAGNOSIS — Z87.891 PERSONAL HISTORY OF NICOTINE DEPENDENCE: ICD-10-CM

## 2021-03-01 DIAGNOSIS — M81.0 AGE-RELATED OSTEOPOROSIS WITHOUT CURRENT PATHOLOGICAL FRACTURE: ICD-10-CM

## 2021-03-01 DIAGNOSIS — I10 ESSENTIAL (PRIMARY) HYPERTENSION: ICD-10-CM

## 2021-03-01 DIAGNOSIS — Z96.641 PRESENCE OF RIGHT ARTIFICIAL HIP JOINT: ICD-10-CM

## 2021-03-01 DIAGNOSIS — Z90.722 ACQUIRED ABSENCE OF OVARIES, BILATERAL: Chronic | ICD-10-CM

## 2021-03-01 RX ORDER — ALENDRONATE SODIUM 70 MG/1
1 TABLET ORAL
Qty: 0 | Refills: 0 | DISCHARGE

## 2021-03-01 RX ORDER — SUMATRIPTAN SUCCINATE 4 MG/.5ML
0.5 INJECTION, SOLUTION SUBCUTANEOUS
Qty: 0 | Refills: 0 | DISCHARGE

## 2021-03-02 ENCOUNTER — TRANSCRIPTION ENCOUNTER (OUTPATIENT)
Age: 76
End: 2021-03-02

## 2021-03-17 ENCOUNTER — APPOINTMENT (OUTPATIENT)
Dept: NEUROLOGY | Facility: CLINIC | Age: 76
End: 2021-03-17
Payer: MEDICARE

## 2021-03-17 VITALS
HEART RATE: 73 BPM | HEIGHT: 66 IN | SYSTOLIC BLOOD PRESSURE: 134 MMHG | DIASTOLIC BLOOD PRESSURE: 67 MMHG | BODY MASS INDEX: 21.53 KG/M2 | WEIGHT: 134 LBS

## 2021-03-17 DIAGNOSIS — E55.9 VITAMIN D DEFICIENCY, UNSPECIFIED: ICD-10-CM

## 2021-03-17 PROCEDURE — 99204 OFFICE O/P NEW MOD 45 MIN: CPT

## 2021-03-17 NOTE — ASSESSMENT
[FreeTextEntry1] : Nortriptyline gabapentin lidocaine patch prednisone course, vitamin D3 calcium and calcitonin. Will discuss with endocrinologist; discuss with PM&R extremity swelling treatment. Pain management consultation. She is beginning treatment for Complex Regional Pain Syndrome Type II recurrence (the previous occurrence was after the last shoulder surgery). Prognosis is guarded at this stage.

## 2021-03-17 NOTE — PHYSICAL EXAM
[FreeTextEntry1] : Swollen right arm forearm and wrist circumferentially. Dusky red discoloration. Bilateral deltoids bulk appears  reduced. There is marked tenderness of the outside of the forearm, wrist, and fingers of the right hand. She is unable to move the right shoulder through the full range of movements. PROM flexor pollicis longus and extensor pollicis are weak. The  is weak on the right. [Person] : oriented to person [Place] : oriented to place [Time] : oriented to time [Concentration Intact] : normal concentrating ability [Visual Intact] : visual attention was ~T not ~L decreased [Naming Objects] : no difficulty naming common objects [Repeating Phrases] : no difficulty repeating a phrase [Writing A Sentence] : no difficulty writing a sentence [Fluency] : fluency intact [Comprehension] : comprehension intact [Reading] : reading intact [Past History] : adequate knowledge of personal past history [Cranial Nerves Optic (II)] : visual acuity intact bilaterally,  visual fields full to confrontation, pupils equal round and reactive to light [Cranial Nerves Oculomotor (III)] : extraocular motion intact [Cranial Nerves Trigeminal (V)] : facial sensation intact symmetrically [Cranial Nerves Facial (VII)] : face symmetrical [Cranial Nerves Vestibulocochlear (VIII)] : hearing was intact bilaterally [Cranial Nerves Glossopharyngeal (IX)] : tongue and palate midline [Cranial Nerves Accessory (XI - Cranial And Spinal)] : head turning and shoulder shrug symmetric [Cranial Nerves Hypoglossal (XII)] : there was no tongue deviation with protrusion [Motor Tone] : muscle tone was normal in all four extremities [Motor Strength] : muscle strength was normal in all four extremities [No Muscle Atrophy] : normal bulk in all four extremities [Paresis Pronator Drift Left-Sided] : no pronator drift on the left [Motor Strength Upper Extremities Right] : there was weakness of the right upper extremity [Motor Strength Upper Extremities Left] : strength was normal in the left upper extremity [Motor Strength Lower Extremities Right] : strength was normal in the right lower extremity [Motor Strength Lower Extremities Left] : strength was normal in the left lower extremity [Sensation Tactile Decrease] : light touch was intact [Allodynia] : ~T allodynia present [Dysesthesia] : dysesthesia was present [Hyperesthesia] : hyperesthesia was present [Abnormal Walk] : normal gait [Balance] : balance was intact [Limited Balance] : balance was intact [Past-pointing] : there was no past-pointing [Tremor] : no tremor present [Coordination - Dysmetria Impaired Finger-to-Nose Left Only] : not present on the left side [Coordination Dysmetria Impaired Heel-to-Shin Using Left Heel] : not present on the left side [1+] : Brachioradialis right 1+ [2+] : Ankle jerk left 2+ [Plantar Reflex Right Only] : normal on the right [Plantar Reflex Left Only] : normal on the left [Sclera] : the sclera and conjunctiva were normal [PERRL With Normal Accommodation] : pupils were equal in size, round, reactive to light, with normal accommodation [Extraocular Movements] : extraocular movements were intact [Outer Ear] : the ears and nose were normal in appearance [Oropharynx] : the oropharynx was normal

## 2021-03-17 NOTE — DISCUSSION/SUMMARY
[FreeTextEntry1] : I reviewed the nerve conduction and electromyographic test results. It is a very good quality complete study.  She has complex regional pain syndrome type II based on the known minor injury to the brachial plexus C6-7 root predominantly demonstrated by EMG.  Interestingly it appears she also had complex regional pain syndrome type II involving the ulnar nerve or roots when she had the left shoulder surgery a few years ago.\par It is difficult to point to a single area for application of the lidocaine patch. To begin, she is advised  nortriptyline 20 mg at bedtime and gabapentin 100-300 mg at bedtime. If she is able to tolerate the gabapentin, she can use 100 mg twice during the day  by 8 hours. A short course of steroids could help if her endocrinologist thinks it will not worsen osteoporosis excessively. Autoimmune antibody tests TOAN and Sjogren's will be investigated. Sedimentation rate and CRP will investigate a systemic inflammatory disorder; vitamin D deficiency will be investigated and treated if present. Limb edema group physical therapy would be ideal, and I will recommend an appropriate group.

## 2021-03-17 NOTE — REVIEW OF SYSTEMS
[Feeling Poorly] : feeling poorly [Feeling Tired] : feeling tired [Arm Weakness] : arm weakness [Hand Weakness] :  hand weakness [Difficulty Writing] : difficulty writing [Numbness] : numbness [Tingling] : tingling [Abnormal Sensation] : an abnormal sensation [Hypersensitivity] : hypersensitivity [Migraine Headache] : migraine headaches [Anxiety] : anxiety [Depression] : depression [Loss Of Hearing] : hearing loss [Negative] : Genitourinary [Fever] : no fever [Chills] : no chills [Recent Weight Gain (___ Lbs)] : no recent weight gain [Recent Weight Loss (___ Lbs)] : no recent weight loss [Confused or Disoriented] : no confusion [Memory Lapses or Loss] : no memory loss [Decr. Concentrating Ability] : no decrease in concentrating ability [Difficulty with Language] : no ~M difficulty with language [Changed Thought Patterns] : no change in thought patterns [Facial Weakness] : no facial weakness [Leg Weakness] : no leg weakness [Poor Coordination] : good coordination [Difficulties in Speech] : no speech difficulties [Seizures] : no convulsions [Dizziness] : no dizziness [Fainting] : no fainting [Lightheadedness] : no lightheadedness [Vertigo] : no vertigo [Difficulty Walking] : no difficulty walking [Inability to Walk] : able to walk [Ataxia] : no ataxia [Frequent Falls] : not falling [Limping] : not limping [Suicidal] : not suicidal [Change In Personality] : no personality change [Emotional Problems] : no emotional problems [Limb Pain] : limb pain [Limb Swelling] : limb swelling [Skin Lesions] : no skin lesions

## 2021-03-17 NOTE — HISTORY OF PRESENT ILLNESS
[FreeTextEntry1] : Following surgery for right shoulder replacement on 3/1/2021, she developed pain in the whole arm described as tingling, sharp, electrical and unpleasant with hypersensitivity to touch and diminished sensation in the outer 3 fingers of the hand outside of the forearm and medial arm. She has also developed progressive swelling of he right upper extremity with resulting tightness of the right wrist. A Doppler test identified a thrombus in the right  She has not been able to use the shoulder post operatively and began physical therapy two days ago. She had a NCV and EMG on the 10 th of March whose report is available. 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 She has difficulty using the right hand with all manner of work.\par Significantly she did have involvement of the inside of the left arm and forearm as well as the little finger of the left hand following left shoulder replacement a few years ago that is now better but not completely relieved.\par She admits one to 2 headaches a week that she is able to control with the help of half a symmetric 10. She is suffered headaches since her 20s. She also admits to smoking cigarettes for about 20 years during which she smoked 10 cigarettes a day. She quit about 35 years ago

## 2021-03-22 LAB
25(OH)D3 SERPL-MCNC: 38 NG/ML
ANA SER IF-ACNC: NEGATIVE
CRP SERPL-MCNC: <3 MG/L
ENA SS-A AB SER IA-ACNC: <0.2 AL
ENA SS-B AB SER IA-ACNC: <0.2 AL
ERYTHROCYTE [SEDIMENTATION RATE] IN BLOOD BY WESTERGREN METHOD: 4 MM/HR

## 2021-03-31 ENCOUNTER — APPOINTMENT (OUTPATIENT)
Dept: NEUROLOGY | Facility: CLINIC | Age: 76
End: 2021-03-31
Payer: MEDICARE

## 2021-03-31 VITALS
WEIGHT: 135 LBS | SYSTOLIC BLOOD PRESSURE: 136 MMHG | HEIGHT: 66 IN | DIASTOLIC BLOOD PRESSURE: 78 MMHG | HEART RATE: 67 BPM | BODY MASS INDEX: 21.69 KG/M2

## 2021-03-31 VITALS — TEMPERATURE: 97.3 F

## 2021-03-31 DIAGNOSIS — K29.70 GASTRITIS, UNSPECIFIED, W/OUT BLEEDING: ICD-10-CM

## 2021-03-31 PROCEDURE — 99214 OFFICE O/P EST MOD 30 MIN: CPT

## 2021-03-31 NOTE — PHYSICAL EXAM
[Person] : oriented to person [Time] : oriented to time [Place] : oriented to place [Concentration Intact] : normal concentrating ability [Visual Intact] : visual attention was ~T not ~L decreased [Naming Objects] : no difficulty naming common objects [Repeating Phrases] : no difficulty repeating a phrase [Writing A Sentence] : no difficulty writing a sentence [Fluency] : fluency intact [Comprehension] : comprehension intact [Reading] : reading intact [Past History] : adequate knowledge of personal past history [Cranial Nerves Optic (II)] : visual acuity intact bilaterally,  visual fields full to confrontation, pupils equal round and reactive to light [Cranial Nerves Oculomotor (III)] : extraocular motion intact [Cranial Nerves Trigeminal (V)] : facial sensation intact symmetrically [Cranial Nerves Facial (VII)] : face symmetrical [Cranial Nerves Vestibulocochlear (VIII)] : hearing was intact bilaterally [Cranial Nerves Glossopharyngeal (IX)] : tongue and palate midline [Cranial Nerves Accessory (XI - Cranial And Spinal)] : head turning and shoulder shrug symmetric [Cranial Nerves Hypoglossal (XII)] : there was no tongue deviation with protrusion [Motor Tone] : muscle tone was normal in all four extremities [Motor Strength] : muscle strength was normal in all four extremities [No Muscle Atrophy] : normal bulk in all four extremities [Motor Strength Upper Extremities Right] : there was weakness of the right upper extremity [Sensation Tactile Decrease] : light touch was intact [Allodynia] : ~T allodynia present [Dysesthesia] : dysesthesia was present [Hyperesthesia] : hyperesthesia was present [Abnormal Walk] : normal gait [Balance] : balance was intact [1+] : Brachioradialis right 1+ [2+] : Ankle jerk left 2+ [Sclera] : the sclera and conjunctiva were normal [PERRL With Normal Accommodation] : pupils were equal in size, round, reactive to light, with normal accommodation [Extraocular Movements] : extraocular movements were intact [Outer Ear] : the ears and nose were normal in appearance [Oropharynx] : the oropharynx was normal [FreeTextEntry1] : Marked improvement in swelling right arm forearm and wrist . Improved color- less  red discoloration.Reduced  tenderness of the outside of the forearm, wrist, and fingers of the right hand. She is unable to move the right shoulder through the full range of movements. PROM flexor pollicis longus and extensor pollicis are weak. The  is weak on the right.\par Sensation at tip of thumb for touch and pin is  0 but 50 % at other tips and 75% at base of digits compared with good left hand [Paresis Pronator Drift Left-Sided] : no pronator drift on the left [Motor Strength Upper Extremities Left] : strength was normal in the left upper extremity [Motor Strength Lower Extremities Right] : strength was normal in the right lower extremity [Motor Strength Lower Extremities Left] : strength was normal in the left lower extremity [Limited Balance] : balance was intact [Past-pointing] : there was no past-pointing [Tremor] : no tremor present [Coordination - Dysmetria Impaired Finger-to-Nose Left Only] : not present on the left side [Coordination Dysmetria Impaired Heel-to-Shin Using Left Heel] : not present on the left side [Plantar Reflex Left Only] : normal on the left [Plantar Reflex Right Only] : normal on the right

## 2021-03-31 NOTE — HISTORY OF PRESENT ILLNESS
[FreeTextEntry1] : Improvement in pain of the forearm from 8/10 to 4-5/10. Numbness in the fingers of the hand is the only deficit that really bothers her now. She is able to sleep a little better

## 2021-03-31 NOTE — ASSESSMENT
[FreeTextEntry1] : Markedly improved/ Although still remains disappointed with the numbness of her finger-tips, the reduction of edema and the recovery of sensation at the base of the finger tips, all indicate a cautiously optimistic prognosis.

## 2021-03-31 NOTE — DISCUSSION/SUMMARY
[FreeTextEntry1] : Recommend a short Course of prednisone, increase gabapentin to 600 mg a night if tolerated. Repeat EMG/NCV, continue nortriptyline unchanged

## 2021-04-21 ENCOUNTER — APPOINTMENT (OUTPATIENT)
Dept: NEUROLOGY | Facility: CLINIC | Age: 76
End: 2021-04-21

## 2021-04-28 ENCOUNTER — APPOINTMENT (OUTPATIENT)
Dept: NEUROLOGY | Facility: CLINIC | Age: 76
End: 2021-04-28
Payer: MEDICARE

## 2021-04-28 VITALS
WEIGHT: 137 LBS | HEART RATE: 68 BPM | HEIGHT: 66 IN | SYSTOLIC BLOOD PRESSURE: 119 MMHG | BODY MASS INDEX: 22.02 KG/M2 | DIASTOLIC BLOOD PRESSURE: 73 MMHG

## 2021-04-28 PROCEDURE — 99214 OFFICE O/P EST MOD 30 MIN: CPT

## 2021-04-29 NOTE — PHYSICAL EXAM
[General Appearance - Alert] : alert [General Appearance - In No Acute Distress] : in no acute distress [General Appearance - Well Nourished] : well nourished [General Appearance - Well Developed] : well developed [General Appearance - Well-Appearing] : healthy appearing [Oriented To Time, Place, And Person] : oriented to person, place, and time [Impaired Insight] : insight and judgment were intact [Memory Recent] : recent memory was not impaired [Person] : oriented to person [Place] : oriented to place [Time] : oriented to time [Short Term Intact] : short term memory intact [Remote Intact] : remote memory intact [Span Intact] : the attention span was normal [Concentration Intact] : normal concentrating ability [Visual Intact] : visual attention was ~T not ~L decreased [Naming Objects] : no difficulty naming common objects [Repeating Phrases] : no difficulty repeating a phrase [Writing A Sentence] : no difficulty writing a sentence [Fluency] : fluency intact [Comprehension] : comprehension intact [Reading] : reading intact [Current Events] : adequate knowledge of current events [Vocabulary] : adequate range of vocabulary [I: Normal Smell] : smell intact bilaterally [Cranial Nerves Optic (II)] : visual acuity intact bilaterally,  visual fields full to confrontation, pupils equal round and reactive to light [Cranial Nerves Oculomotor (III)] : extraocular motion intact [Cranial Nerves Trigeminal (V)] : facial sensation intact symmetrically [Cranial Nerves Facial (VII)] : face symmetrical [Cranial Nerves Vestibulocochlear (VIII)] : hearing was intact bilaterally [Cranial Nerves Glossopharyngeal (IX)] : tongue and palate midline [Cranial Nerves Accessory (XI - Cranial And Spinal)] : head turning and shoulder shrug symmetric [Cranial Nerves Hypoglossal (XII)] : there was no tongue deviation with protrusion [Motor Tone] : muscle tone was normal in all four extremities [Motor Strength] : muscle strength was normal in all four extremities [Motor Handedness Right-Handed] : the patient is right hand dominant [Abnormal Walk] : normal gait [Balance] : balance was intact [1+] : Brachioradialis right 1+ [2+] : Ankle jerk left 2+ [PERRL With Normal Accommodation] : pupils were equal in size, round, reactive to light, with normal accommodation [Sclera] : the sclera and conjunctiva were normal [Extraocular Movements] : extraocular movements were intact [Outer Ear] : the ears and nose were normal in appearance [Oropharynx] : the oropharynx was normal [Tactile Decrease Lateral Upper Thigh - Left Only] : normal left lateral upper thigh [Tactile Decrease Lateral Upper Thigh - Right Only] : normal right lateral upper thigh [Tactile Decrease Hand Palmar Aspect Of Radial 3 1/2 Digits] : normal left palmar aspect of the radial 3-1/2 digits [Tactile Decrease Lower Medial Thigh And Knee - Left Only] : normal left lower medial thigh and knee [Tactile Decrease Lower Medial Thigh And Knee - Right Only] : normal right lower medial thigh and knee [Tactile Decrease Hand Dorsal Aspect Radial 3-1/2 Digits] : normal left dorsal aspect of the radial 3-1/2 digits [Tactile Decrease Deep Peroneal Nerve Right Leg] : normal right web space between the 1st and 2nd toes [Tactile Decrease Deep Peroneal Nerve Left Leg] : normal left web space between the 1st and 2nd toes [Tactile Decrease Hand Ulnar 1 1/2 Digits] : normal left ulnar 1-1/2 digits [Tactile Decrease Superficial Peroneal Nerve Right Leg] : normal right dorsum of the foot [Tactile Decrease Superficial Peroneal Nerve Left Leg] : normal left dorsum of the foot [Tactile Decrease Monofilament Wire Test Right Foot] : normal right foot monofilament wire testing [Tactile Decrease Monofilament Wire Test Left Foot] : normal left foot monofilament wire testing [Pain / Temp Decrease Lateral Upper Thigh - Right Only] : normal right lateral upper thigh [Pain / Temp Decrease Lateral Upper Thigh - Left Only] : normal left lateral upper thigh [Pain / Temp Decrease Hand Palmar Aspect Radial 3-1/2 Digits] : normal left palmar aspect of the radial 3-1/2 digits [Pain / Temp Decrease Lower Medial Thigh & Knee Right Only] : normal right lower medial thigh and knee [Pain / Temp Decrease Lower Medial Thigh & Knee Left Only] : normal left lower medial thigh and knee [Pain / Temp Decrease Hand Dorsal Aspect Radial 3-1/2 Digits] : normal left dorsal aspect of the radial 3-1/2 digits [Pain / Temp Decrease Hand Ulnar 1-1/2 Digits] : normal left ulnar 1-1/2 digits [] : normal left dorsum of the foot [Dysdiadochokinesia Bilaterally] : not present [Coordination - Dysmetria Impaired Heel-to-Shin Bilateral] : not present [Coordination - Dysmetria Impaired Finger-to-Nose Bilateral] : not present [Plantar Reflex Right Only] : normal on the right [Plantar Reflex Left Only] : normal on the left [___] : absent on the right [___] : absent on the left

## 2021-04-29 NOTE — DISCUSSION/SUMMARY
[FreeTextEntry1] : Reviewed the repeat EMG by Dr. Nina Chery MD: "This follow-up study of the right upper extremity is consistent with right brachial plexopathy, with C6, C7 greater than C8 involvement as was noted on the prior study from 3/10/2021.  Compared to the previous study, motor amplitudes are slightly improved.  Sensory potential amplitudes are lower pertaining to the C6, C7 components compared to C5, C8 and T1 components.  Needle examination of the relevant muscles now reveals reinnervation motor units.  Recommend clinical correlation and a follow-up study if clinically indicated.  There is no  electrophysiological evidence for right cervical radiculopathy at present time".\par \par She very likely suffered complex regional pain syndrome type I due to minor brachial plexus injury.  She has now recovered from most of the CRPS symptoms but is left with persistent numbness likely due to injury to the brachial plexus components that supply the median nerve.  The prognosis for recovery is guarded but good and is likely going to be slow.  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  She has discontinued gabapentin and nortriptyline without recurrence of pain.  She may use the gabapentin to reduce active neuropathic symptoms as needed.  She will follow-up as needed.

## 2021-04-29 NOTE — HISTORY OF PRESENT ILLNESS
[FreeTextEntry1] : Following surgery for right shoulder replacement on 3/1/2021, she developed pain in the whole arm described as tingling, sharp, electrical and unpleasant with hypersensitivity to touch and diminished sensation in the outer 3 fingers of the hand outside of the forearm and medial arm. She also developed progressive swelling of the right upper extremity with resulting tightness of the right wrist. A Doppler test identified a thrombus in the right superficial vein.  She had not been able to use the shoulder post operatively and had physical therapy. She had a NCV and EMG on the 10 th of March whose report is available. 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 She has difficulty using the right hand with all manner of work.\par Significantly she did have involvement of the inside of the left arm and forearm as well as the little finger of the left hand following left shoulder replacement a few years ago that is now better but not completely relieved.\par She admits one to 2 headaches a week that she is able to control with the help of half a symmetric 10. She is suffered headaches since her 20s. She also admits to smoking cigarettes for about 20 years during which she smoked 10 cigarettes a day. She quit about 35 years ago\par Two weeks after the first appointment, she had a significant improvement in pain from 8-9 /10 to 4–5/10.  But she continued to experience numbness of the fingers of the right hand.\par \par Today she reports that she is free of pain and swelling.  However the numbness of the outer 3-1/2 fingers persists with tingling and interferes with her work as a [employer].

## 2021-04-29 NOTE — REVIEW OF SYSTEMS
[Feeling Poorly] : feeling poorly [Feeling Tired] : feeling tired [Arm Weakness] : arm weakness [Hand Weakness] :  hand weakness [Difficulty Writing] : difficulty writing [Numbness] : numbness [Tingling] : tingling [Abnormal Sensation] : an abnormal sensation [Hypersensitivity] : hypersensitivity [Migraine Headache] : migraine headaches [Anxiety] : anxiety [Depression] : depression [Loss Of Hearing] : hearing loss [Limb Pain] : limb pain [Limb Swelling] : limb swelling [Negative] : Genitourinary [Fever] : no fever [Chills] : no chills [Recent Weight Gain (___ Lbs)] : no recent weight gain [Recent Weight Loss (___ Lbs)] : no recent weight loss [Confused or Disoriented] : no confusion [Memory Lapses or Loss] : no memory loss [Decr. Concentrating Ability] : no decrease in concentrating ability [Difficulty with Language] : no ~M difficulty with language [Changed Thought Patterns] : no change in thought patterns [Leg Weakness] : no leg weakness [Facial Weakness] : no facial weakness [Poor Coordination] : good coordination [Difficulties in Speech] : no speech difficulties [Seizures] : no convulsions [Dizziness] : no dizziness [Fainting] : no fainting [Lightheadedness] : no lightheadedness [Vertigo] : no vertigo [Difficulty Walking] : no difficulty walking [Inability to Walk] : able to walk [Ataxia] : no ataxia [Frequent Falls] : not falling [Limping] : not limping [Suicidal] : not suicidal [Change In Personality] : no personality change [Emotional Problems] : no emotional problems [Skin Lesions] : no skin lesions

## 2021-05-06 NOTE — H&P PST ADULT - BLOOD TRANSFUSION, PREVIOUS, PROFILE
[facility]  Hematology/Oncology Follow Up Note    Isidra Haney   1953   [id number]       05/07/21    Reason for Visit:  Follow-up breast cancer on anastrozole  Chief Complaint   Patient presents with   • Office Visit       History of Present Illness:   67 year old health female who presented for a screening mammogram that showed new architectural distortion in the right breast.  She was evaluated for a diagnostic mammogram and ultrasound that showed a 1.3 cm mass with indistinct and spiculated margins in the right breast at 10 o'clock.  The patient underwent US guided biopsy that showed an invasive lobular carcinoma, grade 1 with scattered foci of lobular hyperplasia.  ER 95, ME 50, Her-2 negative by FISH.  She was evaluated by Dr Parks and MRI was ordered that showed a 1.3 cm irregular mass at 11 o'clock posterior depth and 5 mm satellite nodule with similar characteristics to the index mass medial to the mass.  She did have an elevated alk phos on evaluation so bone scan was ordered and it was negative for metastatic disease.      Pathology from surgical excision showed a 1.5 x 1.2 x 1.0 cm lesion with invasive lobular carcinoma with two negative sentinel lymph nodes. Oncotype RS 18.      She started Prolia and did well with this.  Mammogram in October was normal. Patient reported that she had a history of thyroid biopsies in the past.     Date of Service: 07/30/2019    US thyroid obtained on 5/30/2019 demonstrated the right lobe of the thyroid gland measures 5.2 cm x 2.3 cm x 2.9 cm, while the left lobe of the thyroid gland measures 4.3 cm x 1.4 cm x 1.6 cm. The isthmus measures 0.2 cm in thickness. Thyroid echotexture is heterogeneous, with multinodular gland noted. Dominant lesion is seen in the right lobe, occupying bulk of volume of the mid and lower portion of right lobe, measuring 3.6 cm x 1.9 cm x 2.5 cm. Lesion is predominantly solid, with only  minimal amounts of cystic change present. Echotexture is heterogeneous, but with bulk of lesion fairly isoechoic to remainder of gland. Lesion is wider than tall and demonstrates smooth margin. This would correspond with a TIRADS category 3 lesion. Given size greater than 2.5 cm, US-guided biopsy is recommended.    US-guided thyroid biopsy obtained on 06/19/2019 revealed benign results. Cytologic data interpreted as negative for malignancy. Abundant colloid and benign-appearing follicular cells are present; features are compatible with a benign follicular nodule. [place] Classification: II (benign).   CT pelvis obtained on 08/09/2019 identified no mass or adenopathy. Uncomplicated colonic diverticulosis and small bilateral fat-containing inguinal hernias was noted.     DEXA scan on 7/31/20 showed L1-L4 t-score: -1.3, left hip t-score: -0.4, femoral neck t-score: -1.2. Bone mineral density was in osteopenia category. The patient has increased fracture risk.    Screening mammogram on 2/9/21 showed no mammographic evidence of malignancy.     Date of Service May 6th, 2021:  Isidra returns for follow-up. She is feeling okay. Notes difficulty sleeping. She wakes up in the middle of the night and cannot fall back asleep. This is happening every night. Has been taking melatonin every night. No anxiety. Has knee and shoulder pain. This was improved with prolia. Tolerating anastrozole well.       Review of Systems   Constitutional: Negative.    HENT: Negative.    Eyes: Negative.    Respiratory: Negative.    Cardiovascular: Negative.    Gastrointestinal: Negative.    Endocrine: Negative.    Genitourinary: Negative.    Musculoskeletal: Negative.    Skin: Negative.    Allergic/Immunologic: Negative.    Neurological: Negative.    Hematological: Negative.         Objective:     /80   Pulse 76   Temp 97.6 °F (36.4 °C)   Resp 16   Ht 4' 11\" (1.499 m)   Wt 67.6 kg (149 lb 0.5 oz)   BMI 30.10 kg/m²   BSA 1.63 m²    ECOG  [05/07/21 1713]   ECOG Performance Status 0         Physical Exam   Constitutional: She is oriented to person, place, and time. She appears well-developed and well-nourished. No distress.   HENT:   Head: Normocephalic.   Eyes: Pupils are equal, round, and reactive to light.   Neck: No thyromegaly present.   Cardiovascular: Normal rate, regular rhythm and normal heart sounds. Exam reveals no gallop and no friction rub.   No murmur heard.  Pulmonary/Chest: Breath sounds normal. No respiratory distress.   Abdominal: Soft. Bowel sounds are normal. She exhibits no distension. There is no abdominal tenderness.   Musculoskeletal:         General: No edema. Normal range of motion.      Cervical back: Normal range of motion.   Neurological: She is alert and oriented to person, place, and time. No cranial nerve deficit.   Skin: Skin is warm and dry. She is not diaphoretic. No erythema.   Breast: Bilateral breast exam without masses, skin changes or axillary adenopathy      Hospital Outpatient Visit on 12/30/2020   Component Date Value Ref Range Status   • Case Report 12/30/2020    Final                    Value:Surgical Pathology                                Case: [id number]                                  Authorizing Provider:  Bibiana Bonilla,  Collected:           12/30/2020 1106                                     DO                                                                           Ordering Location:     [facility]            Received:            12/30/2020 1449                                     GASTROENTEROLOGY                                                             Pathologist:           Armando Mitchell MD                                                         Specimen:    Rectum, Rectal Polyp                                                                      • Pathologic Diagnosis 12/30/2020    Final                    Value:This result contains rich text formatting which cannot be  displayed here.   • Clinical Information 12/30/2020    Final                    Value:This result contains rich text formatting which cannot be displayed here.   • Gross Description 12/30/2020    Final                    Value:This result contains rich text formatting which cannot be displayed here.   • Disclaimer 12/30/2020    Final                    Value:This result contains rich text formatting which cannot be displayed here.        Imaging studies were reviewed with the following pertinent positives: None.    ASSESSMENT AND PLAN:  1.  [id number] infiltrating lobular carcinoma, ER positive, SC positive and Her-2 negative.  Oncotype RS 18, low risk  2.  Pt s/p breast conservation and sentinel lymph node biopsy  3.  Risk of osteoporosis with AI therapy, osteopenia on DEXA  4.  Thyroid nodules    · No evidence of disease recurrence on clinical exam.  · She initiated on anastrazole in October 2016. Plan to continue for 5 years (Oct 2021). She is tolerating this well. This can be extended out to 7 years.  · Continue calcium + D BID. Plan to give prolia every six months for two years.This was started in  early January 2019. Due for prolia in June. Repeat DEXA at two years in 2022.  · Continue follow up clinical breast exam every six months for two years.  · Continue tramadol for hip pain. Advised pt to take sparingly.   · Pt has difficulty sleeping. She has been taking melatonin. I discussed option for ambien or trazadone/mirtazapine. Ambien would need to be taken sparingly. Trazadone is not habit forming and can be taken nightly. Mirtazapine is an appetite stimulant so I would like to avoid this. I recommend trazadone in this situation.   · Trazadone prescribed. Will start with lowest dose. Pt will call if she has excessive drowsiness. Can titrate up if needed or discontinue. Advised pt to hold if she does not need it.  · Will get imaging if neck/shoulder pain does not improve.  · Pt should have repeat colonoscopy,  particularly given blood in stool   · Will see ophthalmologist to assess vision problems. If the cause cannot be determined, will get MRI brain. Office will reach out to patient after ophthalmology appointment.  · Pt can return to the gym if she has received both doses of COVID-19 vaccine. Going to the gym may help with sleep.   RTC MD 6 months with labs.      On 05/07/21, Marsha HA scribed the services personally performed by Anna aBum MD    The documentation recorded by the scribe accurately and completely reflects the service(s) I personally performed and the decisions made by me.     Recent PHQ 2/9 Score    PHQ 2:  Date Adult PHQ 2 Score Adult PHQ 2 Interpretation   5/6/2021 0 No further screening needed       PHQ 9:       Distress Score   [facility]      Distress Scale (0-10)          no

## 2021-07-17 ENCOUNTER — TRANSCRIPTION ENCOUNTER (OUTPATIENT)
Age: 76
End: 2021-07-17

## 2021-09-09 ENCOUNTER — NON-APPOINTMENT (OUTPATIENT)
Age: 76
End: 2021-09-09

## 2021-09-09 ENCOUNTER — APPOINTMENT (OUTPATIENT)
Dept: SURGERY | Facility: CLINIC | Age: 76
End: 2021-09-09
Payer: MEDICARE

## 2021-09-09 VITALS
DIASTOLIC BLOOD PRESSURE: 72 MMHG | WEIGHT: 135 LBS | HEART RATE: 69 BPM | OXYGEN SATURATION: 98 % | TEMPERATURE: 97.2 F | BODY MASS INDEX: 21.69 KG/M2 | HEIGHT: 66 IN | SYSTOLIC BLOOD PRESSURE: 109 MMHG

## 2021-09-09 DIAGNOSIS — R19.09 OTHER INTRA-ABDOMINAL AND PELVIC SWELLING, MASS AND LUMP: ICD-10-CM

## 2021-09-09 PROCEDURE — 99204 OFFICE O/P NEW MOD 45 MIN: CPT

## 2021-09-09 NOTE — HISTORY OF PRESENT ILLNESS
[de-identified] : right groin mass [de-identified] : 76 year old white female who presents c/o a right groin mass for the last two months. She has not noticed any changes in the size. She does not have any pain or swelling in the area. She denies any fevers or chills or weight loss.

## 2021-09-09 NOTE — ASSESSMENT
[FreeTextEntry1] : all risks, benefits and options discussed. Pt wants to have the lump biopsies. Ct scan results also discussed.

## 2021-09-09 NOTE — REVIEW OF SYSTEMS
[Easy Bleeding] : no tendency for easy bleeding [Easy Bruising] : a tendency for easy bruising [Swollen Glands] : no swollen glands [Swollen Glands In The Neck] : no swollen glands in the neck [Negative] : Endocrine [FreeTextEntry9] : with back pain

## 2021-09-09 NOTE — PHYSICAL EXAM
[JVD] : no jugular venous distention  [Normal Thyroid] : the thyroid was normal [Carotid Bruits] : no carotid bruits [Normal Breath Sounds] : Normal breath sounds [Normal Heart Sounds] : normal heart sounds [Normal Rate and Rhythm] : normal rate and rhythm [No Rash or Lesion] : No rash or lesion [Alert] : alert [Oriented to Person] : oriented to person [Oriented to Place] : oriented to place [Oriented to Time] : oriented to time [Calm] : calm [de-identified] : well developed white female in no distress [de-identified] : noninjected and nonicteric [de-identified] : without adenopathy [de-identified] : refused, without axillary adenopathy [de-identified] : bingn [de-identified] : with right groin lump , r/o adenopathy [de-identified] : refused [de-identified] : without cafl pain or swelling

## 2021-09-14 ENCOUNTER — FORM ENCOUNTER (OUTPATIENT)
Age: 76
End: 2021-09-14

## 2021-09-17 ENCOUNTER — OUTPATIENT (OUTPATIENT)
Dept: OUTPATIENT SERVICES | Facility: HOSPITAL | Age: 76
LOS: 1 days | End: 2021-09-17
Payer: MEDICARE

## 2021-09-17 DIAGNOSIS — Z20.828 CONTACT WITH AND (SUSPECTED) EXPOSURE TO OTHER VIRAL COMMUNICABLE DISEASES: ICD-10-CM

## 2021-09-17 DIAGNOSIS — Z96.649 PRESENCE OF UNSPECIFIED ARTIFICIAL HIP JOINT: Chronic | ICD-10-CM

## 2021-09-17 DIAGNOSIS — Z96.612 PRESENCE OF LEFT ARTIFICIAL SHOULDER JOINT: Chronic | ICD-10-CM

## 2021-09-17 DIAGNOSIS — Z90.722 ACQUIRED ABSENCE OF OVARIES, BILATERAL: Chronic | ICD-10-CM

## 2021-09-17 DIAGNOSIS — Z96.641 PRESENCE OF RIGHT ARTIFICIAL HIP JOINT: Chronic | ICD-10-CM

## 2021-09-17 LAB — SARS-COV-2 RNA SPEC QL NAA+PROBE: SIGNIFICANT CHANGE UP

## 2021-09-17 PROCEDURE — U0005: CPT

## 2021-09-17 PROCEDURE — U0003: CPT

## 2021-09-19 ENCOUNTER — FORM ENCOUNTER (OUTPATIENT)
Age: 76
End: 2021-09-19

## 2021-09-19 ENCOUNTER — TRANSCRIPTION ENCOUNTER (OUTPATIENT)
Age: 76
End: 2021-09-19

## 2021-09-20 ENCOUNTER — APPOINTMENT (OUTPATIENT)
Dept: SURGERY | Facility: HOSPITAL | Age: 76
End: 2021-09-20

## 2021-09-20 ENCOUNTER — RESULT REVIEW (OUTPATIENT)
Age: 76
End: 2021-09-20

## 2021-09-20 ENCOUNTER — OUTPATIENT (OUTPATIENT)
Dept: OUTPATIENT SERVICES | Facility: HOSPITAL | Age: 76
LOS: 1 days | End: 2021-09-20
Payer: MEDICARE

## 2021-09-20 DIAGNOSIS — Z96.641 PRESENCE OF RIGHT ARTIFICIAL HIP JOINT: Chronic | ICD-10-CM

## 2021-09-20 DIAGNOSIS — Z96.649 PRESENCE OF UNSPECIFIED ARTIFICIAL HIP JOINT: Chronic | ICD-10-CM

## 2021-09-20 DIAGNOSIS — R59.0 LOCALIZED ENLARGED LYMPH NODES: ICD-10-CM

## 2021-09-20 DIAGNOSIS — Z90.722 ACQUIRED ABSENCE OF OVARIES, BILATERAL: Chronic | ICD-10-CM

## 2021-09-20 DIAGNOSIS — Z96.612 PRESENCE OF LEFT ARTIFICIAL SHOULDER JOINT: Chronic | ICD-10-CM

## 2021-09-20 PROCEDURE — 88333 PATH CONSLTJ SURG CYTO XM 1: CPT | Mod: 26

## 2021-09-20 PROCEDURE — 88184 FLOWCYTOMETRY/ TC 1 MARKER: CPT

## 2021-09-20 PROCEDURE — 88342 IMHCHEM/IMCYTCHM 1ST ANTB: CPT | Mod: 26,59

## 2021-09-20 PROCEDURE — 88360 TUMOR IMMUNOHISTOCHEM/MANUAL: CPT | Mod: 26

## 2021-09-20 PROCEDURE — 88185 FLOWCYTOMETRY/TC ADD-ON: CPT

## 2021-09-20 PROCEDURE — 88307 TISSUE EXAM BY PATHOLOGIST: CPT | Mod: 26

## 2021-09-20 PROCEDURE — 38531 OPEN BX/EXC INGUINOFEM NODES: CPT

## 2021-09-20 PROCEDURE — 88333 PATH CONSLTJ SURG CYTO XM 1: CPT

## 2021-09-20 PROCEDURE — 88360 TUMOR IMMUNOHISTOCHEM/MANUAL: CPT

## 2021-09-20 PROCEDURE — 88341 IMHCHEM/IMCYTCHM EA ADD ANTB: CPT | Mod: 26,59

## 2021-09-20 PROCEDURE — 88307 TISSUE EXAM BY PATHOLOGIST: CPT

## 2021-09-20 PROCEDURE — 88342 IMHCHEM/IMCYTCHM 1ST ANTB: CPT

## 2021-09-20 PROCEDURE — 87205 SMEAR GRAM STAIN: CPT

## 2021-09-20 PROCEDURE — 38760 REMOVE GROIN LYMPH NODES: CPT | Mod: RT

## 2021-09-20 PROCEDURE — 88341 IMHCHEM/IMCYTCHM EA ADD ANTB: CPT

## 2021-09-21 PROCEDURE — 88189 FLOWCYTOMETRY/READ 16 & >: CPT

## 2021-09-24 LAB — SURGICAL PATHOLOGY STUDY: SIGNIFICANT CHANGE UP

## 2021-09-26 ENCOUNTER — FORM ENCOUNTER (OUTPATIENT)
Age: 76
End: 2021-09-26

## 2021-09-27 ENCOUNTER — APPOINTMENT (OUTPATIENT)
Dept: SURGERY | Facility: CLINIC | Age: 76
End: 2021-09-27
Payer: MEDICARE

## 2021-09-27 VITALS — TEMPERATURE: 97.2 F

## 2021-09-27 PROCEDURE — 99024 POSTOP FOLLOW-UP VISIT: CPT

## 2021-09-27 NOTE — PHYSICAL EXAM
[Normal Breath Sounds] : Normal breath sounds [Normal Heart Sounds] : normal heart sounds [Normal Rate and Rhythm] : normal rate and rhythm [Calm] : calm [de-identified] : well developed female in no distress [de-identified] : benign [de-identified] : right groin incision clean and closed, without erythema or swelling [de-identified] : without calf pain or swelling

## 2021-09-27 NOTE — HISTORY OF PRESENT ILLNESS
[de-identified] : s/p right inguinal lymph node biopsy on 9/20/21 [de-identified] : pt without complaints, denies any pain in the area, without fevers or chills

## 2021-10-04 ENCOUNTER — OUTPATIENT (OUTPATIENT)
Dept: OUTPATIENT SERVICES | Facility: HOSPITAL | Age: 76
LOS: 1 days | Discharge: HOME | End: 2021-10-04

## 2021-10-04 DIAGNOSIS — Z90.722 ACQUIRED ABSENCE OF OVARIES, BILATERAL: Chronic | ICD-10-CM

## 2021-10-04 DIAGNOSIS — Z96.649 PRESENCE OF UNSPECIFIED ARTIFICIAL HIP JOINT: Chronic | ICD-10-CM

## 2021-10-04 DIAGNOSIS — C85.90 NON-HODGKIN LYMPHOMA, UNSPECIFIED, UNSPECIFIED SITE: ICD-10-CM

## 2021-10-04 DIAGNOSIS — Z96.641 PRESENCE OF RIGHT ARTIFICIAL HIP JOINT: Chronic | ICD-10-CM

## 2021-10-04 DIAGNOSIS — Z96.612 PRESENCE OF LEFT ARTIFICIAL SHOULDER JOINT: Chronic | ICD-10-CM

## 2021-10-05 ENCOUNTER — RESULT REVIEW (OUTPATIENT)
Age: 76
End: 2021-10-05

## 2021-10-05 ENCOUNTER — APPOINTMENT (OUTPATIENT)
Dept: HEMATOLOGY ONCOLOGY | Facility: CLINIC | Age: 76
End: 2021-10-05
Payer: MEDICARE

## 2021-10-05 VITALS
HEART RATE: 67 BPM | DIASTOLIC BLOOD PRESSURE: 69 MMHG | WEIGHT: 132.28 LBS | HEIGHT: 66 IN | BODY MASS INDEX: 21.26 KG/M2 | OXYGEN SATURATION: 96 % | TEMPERATURE: 96.8 F | SYSTOLIC BLOOD PRESSURE: 124 MMHG | RESPIRATION RATE: 14 BRPM

## 2021-10-05 DIAGNOSIS — Z80.6 FAMILY HISTORY OF LEUKEMIA: ICD-10-CM

## 2021-10-05 DIAGNOSIS — Z72.89 OTHER PROBLEMS RELATED TO LIFESTYLE: ICD-10-CM

## 2021-10-05 DIAGNOSIS — Z87.891 PERSONAL HISTORY OF NICOTINE DEPENDENCE: ICD-10-CM

## 2021-10-05 DIAGNOSIS — Z96.649 PRESENCE OF UNSPECIFIED ARTIFICIAL HIP JOINT: ICD-10-CM

## 2021-10-05 DIAGNOSIS — G43.909 MIGRAINE, UNSPECIFIED, NOT INTRACTABLE, W/OUT STATUS MIGRAINOSUS: ICD-10-CM

## 2021-10-05 LAB
BASOPHILS # BLD AUTO: 0.07 K/UL — SIGNIFICANT CHANGE UP (ref 0–0.2)
BASOPHILS NFR BLD AUTO: 1.1 % — SIGNIFICANT CHANGE UP (ref 0–2)
EOSINOPHIL # BLD AUTO: 0.13 K/UL — SIGNIFICANT CHANGE UP (ref 0–0.5)
EOSINOPHIL NFR BLD AUTO: 2.1 % — SIGNIFICANT CHANGE UP (ref 0–6)
HCT VFR BLD CALC: 41.6 % — SIGNIFICANT CHANGE UP (ref 34.5–45)
HGB BLD-MCNC: 13.7 G/DL — SIGNIFICANT CHANGE UP (ref 11.5–15.5)
IMM GRANULOCYTES NFR BLD AUTO: 0.5 % — SIGNIFICANT CHANGE UP (ref 0–1.5)
LYMPHOCYTES # BLD AUTO: 1.61 K/UL — SIGNIFICANT CHANGE UP (ref 1–3.3)
LYMPHOCYTES # BLD AUTO: 25.6 % — SIGNIFICANT CHANGE UP (ref 13–44)
MCHC RBC-ENTMCNC: 30.8 PG — SIGNIFICANT CHANGE UP (ref 27–34)
MCHC RBC-ENTMCNC: 32.9 G/DL — SIGNIFICANT CHANGE UP (ref 32–36)
MCV RBC AUTO: 93.5 FL — SIGNIFICANT CHANGE UP (ref 80–100)
MONOCYTES # BLD AUTO: 0.53 K/UL — SIGNIFICANT CHANGE UP (ref 0–0.9)
MONOCYTES NFR BLD AUTO: 8.4 % — SIGNIFICANT CHANGE UP (ref 2–14)
NEUTROPHILS # BLD AUTO: 3.92 K/UL — SIGNIFICANT CHANGE UP (ref 1.8–7.4)
NEUTROPHILS NFR BLD AUTO: 62.3 % — SIGNIFICANT CHANGE UP (ref 43–77)
NRBC # BLD: 0 /100 WBCS — SIGNIFICANT CHANGE UP (ref 0–0)
PLATELET # BLD AUTO: 185 K/UL — SIGNIFICANT CHANGE UP (ref 150–400)
RBC # BLD: 4.45 M/UL — SIGNIFICANT CHANGE UP (ref 3.8–5.2)
RBC # FLD: 12.4 % — SIGNIFICANT CHANGE UP (ref 10.3–14.5)
WBC # BLD: 6.29 K/UL — SIGNIFICANT CHANGE UP (ref 3.8–10.5)
WBC # FLD AUTO: 6.29 K/UL — SIGNIFICANT CHANGE UP (ref 3.8–10.5)

## 2021-10-05 PROCEDURE — 99205 OFFICE O/P NEW HI 60 MIN: CPT

## 2021-10-05 RX ORDER — CYCLOBENZAPRINE HYDROCHLORIDE 5 MG/1
5 TABLET, FILM COATED ORAL
Qty: 30 | Refills: 0 | Status: DISCONTINUED | COMMUNITY
Start: 2019-04-03 | End: 2021-10-05

## 2021-10-05 RX ORDER — OXYCODONE 5 MG/1
5 TABLET ORAL
Qty: 50 | Refills: 0 | Status: DISCONTINUED | COMMUNITY
Start: 2019-04-12 | End: 2021-10-05

## 2021-10-05 RX ORDER — GABAPENTIN 100 MG/1
100 CAPSULE ORAL
Qty: 90 | Refills: 3 | Status: DISCONTINUED | COMMUNITY
Start: 2021-03-17 | End: 2021-10-05

## 2021-10-05 RX ORDER — TRAMADOL HYDROCHLORIDE 50 MG/1
50 TABLET, COATED ORAL
Qty: 28 | Refills: 0 | Status: DISCONTINUED | COMMUNITY
Start: 2019-04-12 | End: 2021-10-05

## 2021-10-05 RX ORDER — PANTOPRAZOLE 40 MG/1
40 TABLET, DELAYED RELEASE ORAL
Qty: 30 | Refills: 0 | Status: DISCONTINUED | COMMUNITY
Start: 2019-04-12 | End: 2021-10-05

## 2021-10-05 RX ORDER — OMEPRAZOLE 20 MG/1
20 CAPSULE, DELAYED RELEASE ORAL TWICE DAILY
Qty: 30 | Refills: 0 | Status: DISCONTINUED | COMMUNITY
Start: 2021-03-31 | End: 2021-10-05

## 2021-10-05 RX ORDER — PREDNISONE 20 MG/1
20 TABLET ORAL
Qty: 13 | Refills: 0 | Status: DISCONTINUED | COMMUNITY
Start: 2021-03-31 | End: 2021-10-05

## 2021-10-05 RX ORDER — DICLOFENAC SODIUM 10 MG/G
1 GEL TOPICAL
Refills: 0 | Status: DISCONTINUED | COMMUNITY
End: 2021-10-05

## 2021-10-05 RX ORDER — METHYLPREDNISOLONE 4 MG/1
4 TABLET ORAL
Qty: 21 | Refills: 0 | Status: DISCONTINUED | COMMUNITY
Start: 2019-03-06 | End: 2021-10-05

## 2021-10-05 RX ORDER — NORTRIPTYLINE HYDROCHLORIDE 10 MG/1
10 CAPSULE ORAL
Qty: 60 | Refills: 3 | Status: DISCONTINUED | COMMUNITY
Start: 2021-03-17 | End: 2021-10-05

## 2021-10-05 RX ORDER — CELECOXIB 200 MG/1
200 CAPSULE ORAL
Qty: 30 | Refills: 0 | Status: DISCONTINUED | COMMUNITY
Start: 2019-02-05 | End: 2021-10-05

## 2021-10-08 PROBLEM — G43.909 MIGRAINE WITHOUT STATUS MIGRAINOSUS, NOT INTRACTABLE, UNSPECIFIED MIGRAINE TYPE: Status: ACTIVE | Noted: 2021-10-08

## 2021-10-08 NOTE — REVIEW OF SYSTEMS
[Constipation] : constipation [Diarrhea] : diarrhea [Negative] : Cardiovascular [Abdominal Pain] : no abdominal pain [Vomiting] : no vomiting [FreeTextEntry7] : chronic constipation [de-identified] : left ulnar n damage, hyperesthesia RUE; numbness right thumb, index finger, h/o migraine h/a

## 2021-10-08 NOTE — HISTORY OF PRESENT ILLNESS
[Disease:__________________________] : Disease: [unfilled] [de-identified] : Ms. Lowenberg is a 76-year-old woman with newly diagnosed CS I DLBCL.   In 7/2021, she felt a firm, non-tender  lymph node in the right inguinal area.  U/S showed a 1.7 cm lymph node not further characterized.  CT of the A/P been showed the node to measure 0.9X 1.4 cm, with possible enhancement which was thought to be "somewhat unusual.".  There was no other LAD and there was no organomegaly.  There was a small left renal calculus, small umbilical hernia.  Increased eticular markings were seen at the lung bases, with tiny subpleural nodules at the right lung base thought nonspecific.  CT of chest with contrast showed nonspecific lung nodules, patchy consolidation in the RML with tree-in-bud opacities thought to be inflammatory or infectious.  Scattered foci of mucous plugging were seen, as well.  Mild changes consistent with emphysema were seen.\par Excisional biopsy showed follicular lymphoma, grade 1-2.  Ki-67 was 5-10%.  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  She has a significant history of DJD and has undergone bilateral shoulder arthroplasties along with a right hip arthroplasty which required a subsequent revision. She saw neurology after 3/1/21 right shoulder surgery b/o right arm pain and was diagnosed as having a complex regional pain syndrome.\par She has a h/o migraine headaces esponsive to medication.\par \par \par \par  [de-identified] : I pending bone marrow [de-identified] : initial visit.

## 2021-10-08 NOTE — PHYSICAL EXAM
[Fully active, able to carry on all pre-disease performance without restriction] : Status 0 - Fully active, able to carry on all pre-disease performance without restriction [Normal] : affect appropriate [de-identified] : no CVAT [de-identified] : induration, mild, right inguinal lymphadenectomy site, which is healing well

## 2021-10-08 NOTE — PHYSICAL EXAM
[Fully active, able to carry on all pre-disease performance without restriction] : Status 0 - Fully active, able to carry on all pre-disease performance without restriction [Normal] : affect appropriate [de-identified] : no CVAT [de-identified] : induration, mild, right inguinal lymphadenectomy site, which is healing well

## 2021-10-08 NOTE — ASSESSMENT
[Curative] : Goals of care discussed with patient: Curative [Palliative Care Plan] : not applicable at this time [FreeTextEntry1] : CSI FL, gr 1-2, pending BMA/Bx.  Pt is otherwise healthy.  \par Stage I and contiguous stage II follicular lymphoma can be treated with curative intent using involved field radiotherapy.  \par PET/CT is (-) with respect to presence of disease outside recently operated site, which is likely now showing only post-op changes.  A bone marrow will be done to exclude dissemination of disease.  If the marrow is also (-), an RT consult will be obtained.  40%-50% of patients with limited stage FL can be cured with radiotherapy.    In the setting of having had the node excised, observation is also an option.  The pt is inclined to do all that can be done to avert recurrence.  The unlikelihood of significant toxicity with RT given to this area supports going ahead with treatment.\par Labs will be checked today, incl. CMP, LDH, beta-2 microglobulin, immunoprotein studies and viral serologies.\par The bone marrow will be done within the next few days; if (-)  she will be referred to Radiation Medicine. Small lung nodules will be reassessed with a repeat CT chest in a few months. She has been seen by pulmonary recently.\par \par \par

## 2021-10-08 NOTE — HISTORY OF PRESENT ILLNESS
[Disease:__________________________] : Disease: [unfilled] [de-identified] : Ms. Lowenberg is a 76-year-old woman with newly diagnosed CS I DLBCL.   In 7/2021, she felt a firm, non-tender  lymph node in the right inguinal area.  U/S showed a 1.7 cm lymph node not further characterized.  CT of the A/P been showed the node to measure 0.9X 1.4 cm, with possible enhancement which was thought to be "somewhat unusual.".  There was no other LAD and there was no organomegaly.  There was a small left renal calculus, small umbilical hernia.  Increased eticular markings were seen at the lung bases, with tiny subpleural nodules at the right lung base thought nonspecific.  CT of chest with contrast showed nonspecific lung nodules, patchy consolidation in the RML with tree-in-bud opacities thought to be inflammatory or infectious.  Scattered foci of mucous plugging were seen, as well.  Mild changes consistent with emphysema were seen.\par Excisional biopsy showed follicular lymphoma, grade 1-2.  Ki-67 was 5-10%.  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  She has a significant history of DJD and has undergone bilateral shoulder arthroplasties along with a right hip arthroplasty which required a subsequent revision. She saw neurology after 3/1/21 right shoulder surgery b/o right arm pain and was diagnosed as having a complex regional pain syndrome.\par She has a h/o migraine headaces esponsive to medication.\par \par \par \par  [de-identified] : I pending bone marrow [de-identified] : initial visit.

## 2021-10-08 NOTE — REVIEW OF SYSTEMS
[Constipation] : constipation [Diarrhea] : diarrhea [Negative] : Cardiovascular [Abdominal Pain] : no abdominal pain [Vomiting] : no vomiting [FreeTextEntry7] : chronic constipation [de-identified] : left ulnar n damage, hyperesthesia RUE; numbness right thumb, index finger, h/o migraine h/a

## 2021-10-12 ENCOUNTER — RESULT REVIEW (OUTPATIENT)
Age: 76
End: 2021-10-12

## 2021-10-12 ENCOUNTER — APPOINTMENT (OUTPATIENT)
Dept: HEMATOLOGY ONCOLOGY | Facility: CLINIC | Age: 76
End: 2021-10-12
Payer: MEDICARE

## 2021-10-12 ENCOUNTER — LABORATORY RESULT (OUTPATIENT)
Age: 76
End: 2021-10-12

## 2021-10-12 VITALS
SYSTOLIC BLOOD PRESSURE: 123 MMHG | HEIGHT: 66.1 IN | TEMPERATURE: 97.7 F | BODY MASS INDEX: 21.61 KG/M2 | DIASTOLIC BLOOD PRESSURE: 71 MMHG | RESPIRATION RATE: 18 BRPM | OXYGEN SATURATION: 97 % | HEART RATE: 62 BPM | WEIGHT: 134.48 LBS

## 2021-10-12 LAB
BASOPHILS # BLD AUTO: 0.07 K/UL — SIGNIFICANT CHANGE UP (ref 0–0.2)
BASOPHILS NFR BLD AUTO: 1.5 % — SIGNIFICANT CHANGE UP (ref 0–2)
EOSINOPHIL # BLD AUTO: 0.18 K/UL — SIGNIFICANT CHANGE UP (ref 0–0.5)
EOSINOPHIL NFR BLD AUTO: 3.9 % — SIGNIFICANT CHANGE UP (ref 0–6)
HCT VFR BLD CALC: 39 % — SIGNIFICANT CHANGE UP (ref 34.5–45)
HGB BLD-MCNC: 13.4 G/DL — SIGNIFICANT CHANGE UP (ref 11.5–15.5)
IMM GRANULOCYTES NFR BLD AUTO: 1.5 % — SIGNIFICANT CHANGE UP (ref 0–1.5)
LYMPHOCYTES # BLD AUTO: 1.45 K/UL — SIGNIFICANT CHANGE UP (ref 1–3.3)
LYMPHOCYTES # BLD AUTO: 31.7 % — SIGNIFICANT CHANGE UP (ref 13–44)
MCHC RBC-ENTMCNC: 31.2 PG — SIGNIFICANT CHANGE UP (ref 27–34)
MCHC RBC-ENTMCNC: 34.4 G/DL — SIGNIFICANT CHANGE UP (ref 32–36)
MCV RBC AUTO: 90.7 FL — SIGNIFICANT CHANGE UP (ref 80–100)
MONOCYTES # BLD AUTO: 0.51 K/UL — SIGNIFICANT CHANGE UP (ref 0–0.9)
MONOCYTES NFR BLD AUTO: 11.2 % — SIGNIFICANT CHANGE UP (ref 2–14)
NEUTROPHILS # BLD AUTO: 2.29 K/UL — SIGNIFICANT CHANGE UP (ref 1.8–7.4)
NEUTROPHILS NFR BLD AUTO: 50.2 % — SIGNIFICANT CHANGE UP (ref 43–77)
NRBC # BLD: 0 /100 WBCS — SIGNIFICANT CHANGE UP (ref 0–0)
PLATELET # BLD AUTO: 151 K/UL — SIGNIFICANT CHANGE UP (ref 150–400)
RBC # BLD: 4.3 M/UL — SIGNIFICANT CHANGE UP (ref 3.8–5.2)
RBC # FLD: 12.3 % — SIGNIFICANT CHANGE UP (ref 10.3–14.5)
WBC # BLD: 4.57 K/UL — SIGNIFICANT CHANGE UP (ref 3.8–10.5)
WBC # FLD AUTO: 4.57 K/UL — SIGNIFICANT CHANGE UP (ref 3.8–10.5)

## 2021-10-12 PROCEDURE — 38222 DX BONE MARROW BX & ASPIR: CPT | Mod: LT

## 2021-10-12 NOTE — PROCEDURE
[Bone Marrow Biopsy] : bone marrow biopsy [Bone Marrow Aspiration] : bone marrow aspiration  [Patient] : the patient [Verbal Consent Obtained] : verbal consent was obtained prior to the procedure [Patient identification verified] : patient identification verified [Procedure verified and consent obtained] : procedure verified and consent obtained [Laterality verified and correct site marked] : laterality verified and correct site marked [Correct positioning] : correct positioning [Correct implant and/ or special equipment obtained] : correct impact and/ or special equipment obtained [Prone] : prone [Superior iliac spine was identified] : the superior iliac spine was identified. [Lidocaine was injected and into the periosteum overlying the site.] : Lidocaine was injected and into the periosteum overlying the site. [Aspirate] : aspirate [Cytogenetics] : cytogenetics [FISH] : FISH [Biopsy] : biopsy [Flow Cytometry] : flow cytometry [] : The patient was instructed to remove the bandage the following AM. The patient may bathe. Acetaminophen may be taken for discomfort, as per package directions.If there are any other problems, the patient was instructed to call the office. The patient verbalized understanding, and is aware of the office contact numbers. [Left] : site: left [The left posterior iliac crest was prepped with betadine and draped, using sterile technique.] : The left posterior iliac crest was prepped with betadine and draped, using sterile technique. [FreeTextEntry2] : WBC: 4.57\par Hgb: 13.4\par PLT: 151K\par \par \par Bone marrow biopsy and aspiration completed. patient tolerated procedure well. [FreeTextEntry1] : 77 yo F with newly diagnosed CSI FL BM bx for staging

## 2021-10-12 NOTE — REASON FOR VISIT
[Bone Marrow Biopsy] : bone marrow biopsy [Bone Marrow Aspiration] : bone marrow aspiration [FreeTextEntry2] : 75 yo F with newly diagnosed CSI FL BM bx for staging

## 2021-10-14 ENCOUNTER — APPOINTMENT (OUTPATIENT)
Dept: SURGERY | Facility: CLINIC | Age: 76
End: 2021-10-14
Payer: MEDICARE

## 2021-10-14 VITALS — TEMPERATURE: 98.6 F

## 2021-10-14 PROCEDURE — 99024 POSTOP FOLLOW-UP VISIT: CPT

## 2021-10-14 NOTE — PHYSICAL EXAM
[Normal Breath Sounds] : Normal breath sounds [Normal Heart Sounds] : normal heart sounds [Normal Rate and Rhythm] : normal rate and rhythm [Calm] : calm [de-identified] : well developed female in no distress [de-identified] : benign [de-identified] : Incision clean and closed, without swelling

## 2021-10-14 NOTE — HISTORY OF PRESENT ILLNESS
[de-identified] : s/p right inguinal lymph node biopsy on 9/20/21 [de-identified] : pt without complaints, seen by oncology and just had a bone marrow biopsy. Pt denies any pain or swelling in area of biopsy.

## 2021-10-22 ENCOUNTER — OUTPATIENT (OUTPATIENT)
Dept: OUTPATIENT SERVICES | Facility: HOSPITAL | Age: 76
LOS: 1 days | Discharge: HOME | End: 2021-10-22
Payer: MEDICARE

## 2021-10-22 DIAGNOSIS — Z90.722 ACQUIRED ABSENCE OF OVARIES, BILATERAL: Chronic | ICD-10-CM

## 2021-10-22 DIAGNOSIS — Z96.649 PRESENCE OF UNSPECIFIED ARTIFICIAL HIP JOINT: Chronic | ICD-10-CM

## 2021-10-22 DIAGNOSIS — Z96.612 PRESENCE OF LEFT ARTIFICIAL SHOULDER JOINT: Chronic | ICD-10-CM

## 2021-10-22 DIAGNOSIS — Z96.641 PRESENCE OF RIGHT ARTIFICIAL HIP JOINT: Chronic | ICD-10-CM

## 2021-10-29 ENCOUNTER — APPOINTMENT (OUTPATIENT)
Dept: RADIATION ONCOLOGY | Facility: CLINIC | Age: 76
End: 2021-10-29
Payer: MEDICARE

## 2021-10-29 VITALS
BODY MASS INDEX: 21.8 KG/M2 | HEART RATE: 67 BPM | OXYGEN SATURATION: 98 % | DIASTOLIC BLOOD PRESSURE: 77 MMHG | SYSTOLIC BLOOD PRESSURE: 132 MMHG | RESPIRATION RATE: 18 BRPM | TEMPERATURE: 97.7 F | WEIGHT: 135.45 LBS

## 2021-10-29 PROCEDURE — 99204 OFFICE O/P NEW MOD 45 MIN: CPT | Mod: 25

## 2021-10-29 PROCEDURE — 77263 THER RADIOLOGY TX PLNG CPLX: CPT

## 2021-10-29 RX ORDER — GINGER ROOT/GINGER ROOT EXT 262.5 MG
CAPSULE ORAL
Refills: 0 | Status: ACTIVE | COMMUNITY

## 2021-10-29 NOTE — VITALS
[Maximal Pain Intensity: 0/10] : 0/10 [Date: ____________] : Patient's last distress assessment performed on [unfilled]. [6 - Distress Level] : Distress Level: 6 [Referred Patient  to social work for follow-up] : Patient was referred to social work for follow-up

## 2021-11-01 ENCOUNTER — NON-APPOINTMENT (OUTPATIENT)
Age: 76
End: 2021-11-01

## 2021-11-01 PROCEDURE — 77332 RADIATION TREATMENT AID(S): CPT | Mod: 26

## 2021-11-09 PROCEDURE — 77295 3-D RADIOTHERAPY PLAN: CPT | Mod: 26

## 2021-11-09 PROCEDURE — 77334 RADIATION TREATMENT AID(S): CPT | Mod: 26

## 2021-11-09 PROCEDURE — 77300 RADIATION THERAPY DOSE PLAN: CPT | Mod: 26

## 2021-11-15 ENCOUNTER — APPOINTMENT (OUTPATIENT)
Dept: SURGERY | Facility: CLINIC | Age: 76
End: 2021-11-15
Payer: MEDICARE

## 2021-11-15 VITALS — TEMPERATURE: 208.22 F

## 2021-11-15 PROCEDURE — 77427 RADIATION TX MANAGEMENT X5: CPT

## 2021-11-15 PROCEDURE — 77387B: CUSTOM | Mod: 26

## 2021-11-15 PROCEDURE — 99024 POSTOP FOLLOW-UP VISIT: CPT

## 2021-11-15 NOTE — PHYSICAL EXAM
[Normal Breath Sounds] : Normal breath sounds [Normal Heart Sounds] : normal heart sounds [Normal Rate and Rhythm] : normal rate and rhythm [No Rash or Lesion] : No rash or lesion [Calm] : calm [de-identified] : well developed white female in no distress [de-identified] : benign [de-identified] : inguinal incision clean and closed, without swelling

## 2021-11-15 NOTE — HISTORY OF PRESENT ILLNESS
[de-identified] : s/p right inguinal lymph node biopsy on 9/20/21 [de-identified] : pt without complaints, was seen by oncologist and had her first RT treatment today. Denies any fevers or chills. Without leg swelling.

## 2021-11-16 PROCEDURE — 77387B: CUSTOM | Mod: 26

## 2021-11-16 NOTE — HISTORY OF PRESENT ILLNESS
[Disease:__________________________] : Disease: [unfilled] [FreeTextEntry1] : 75 yo F with [place] stage I B Cell Follicular Lymphoma of the right inguinal LN s/p excision. Sent from Med Onc for IFRT. Recent BM Bx negative.  [de-identified] : Ms. Lowenberg is a 76-year-old woman with newly diagnosed Follicular Lymphoma  \par \par In 7/2021, she felt a firm, non-tender  lymph node in the right inguinal area. Her PCP Dr. Zepeda sent her for U/S-7/19/2021 which showed a 1.7 cm lymph node not further characterized. \par \par 8/16/2021- CT of the A/P  showed the node to measure 0.9 x 1.4 cm, with possible enhancement which was thought to be "somewhat unusual.". There was no other LAD and there was no organomegaly.  There was a small left renal calculus, small umbilical hernia.  Increased reticular markings were seen at the lung bases, with tiny subpleural nodules at the right lung base thought nonspecific.  CT of chest with contrast showed nonspecific lung nodules, patchy consolidation in the RML with tree-in-bud opacities thought to be inflammatory or infectious.  Scattered foci of mucous plugging were seen, as well.  Mild changes consistent with emphysema were seen.\par  \par She had an excisional biopsy on 9/20/2021 that showed follicular lymphoma, grade 1-2.  Ki-67 was 5-10%.  Flow showed a monoclonal lambda CD 20+/CD 10+ B cell population.\par \par 10/01/2021- PET/CT showed post op changes in the right groin and otherwise showed no evidence of lymphoma.\par \par 10/12/2021- Bone marrow biopsy negative. 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 She has a significant history of DJD and has undergone bilateral shoulder arthroplasties along with a right hip arthroplasty which required a subsequent revision. She saw neurology after 3/1/21 right shoulder surgery b/o right arm pain and was diagnosed as having a complex regional pain syndrome. She has a h/o migraine headaches responsive to medication.\par \par Today she presents with her spouse in initial consultation. Denies any constitutional symptoms and no respiratory complaints.\par \par \par \par

## 2021-11-16 NOTE — REVIEW OF SYSTEMS
[Constipation] : constipation [Diarrhea] : diarrhea [Negative] : Allergic/Immunologic [Abdominal Pain] : no abdominal pain [Vomiting] : no vomiting [FreeTextEntry7] : chronic constipation [de-identified] : left ulnar n damage, hyperesthesia RUE; numbness right thumb, index finger, h/o migraine h/a

## 2021-11-16 NOTE — DISEASE MANAGEMENT
[Pathological] : TNM Stage: p [I] : I [FreeTextEntry4] : [place] Stage I [TTNM] : x [NTNM] : x [MTNM] : x

## 2021-11-16 NOTE — PHYSICAL EXAM
[Fully active, able to carry on all pre-disease performance without restriction] : Status 0 - Fully active, able to carry on all pre-disease performance without restriction [Normal] : affect appropriate [de-identified] : no CVAT [de-identified] : induration, mild, right inguinal lymphadenectomy site, which is healing well

## 2021-11-17 PROCEDURE — 77387B: CUSTOM | Mod: 26

## 2021-11-18 PROCEDURE — 77387B: CUSTOM | Mod: 26

## 2021-11-23 ENCOUNTER — NON-APPOINTMENT (OUTPATIENT)
Age: 76
End: 2021-11-23

## 2021-11-24 PROCEDURE — 77427 RADIATION TX MANAGEMENT X5: CPT

## 2021-12-01 ENCOUNTER — NON-APPOINTMENT (OUTPATIENT)
Age: 76
End: 2021-12-01

## 2021-12-06 NOTE — DISEASE MANAGEMENT
[Pathological] : TNM Stage: p [I] : I [FreeTextEntry4] : [place] Stage I [TTNM] : x [NTNM] : x [MTNM] : x [de-identified] : 7686 [de-identified] : 8303 [de-identified] : ZOHRA ovalle

## 2021-12-06 NOTE — DISEASE MANAGEMENT
[Pathological] : TNM Stage: p [I] : I [FreeTextEntry4] : [place] Stage I [TTNM] : x [NTNM] : x [MTNM] : x [de-identified] : 3885 [de-identified] : 4714 [de-identified] : ZOHRA ovalle

## 2021-12-06 NOTE — HISTORY OF PRESENT ILLNESS
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 Sent from Med Onc for IFRT. Recent BM Bx negative. \par \par 11/23/21- Patient presents for first OTV 8/12. No complaints.\par \par 12/1/21- 12/12 OTV: Patient feels generally well. Mild fatigue noted. [de-identified] : Ms. Lowenberg is a 76-year-old woman with newly diagnosed Follicular Lymphoma  \par \par In 7/2021, she felt a firm, non-tender  lymph node in the right inguinal area. Her PCP Dr. Zepeda sent her for U/S-7/19/2021 which showed a 1.7 cm lymph node not further characterized. \par \par 8/16/2021- CT of the A/P  showed the node to measure 0.9 x 1.4 cm, with possible enhancement which was thought to be "somewhat unusual.". There was no other LAD and there was no organomegaly.  There was a small left renal calculus, small umbilical hernia.  Increased reticular markings were seen at the lung bases, with tiny subpleural nodules at the right lung base thought nonspecific.  CT of chest with contrast showed nonspecific lung nodules, patchy consolidation in the RML with tree-in-bud opacities thought to be inflammatory or infectious.  Scattered foci of mucous plugging were seen, as well.  Mild changes consistent with emphysema were seen.\par  \par She had an excisional biopsy on 9/20/2021 that showed follicular lymphoma, grade 1-2.  Ki-67 was 5-10%.  Flow showed a monoclonal lambda CD 20+/CD 10+ B cell population.\par \par 10/01/2021- PET/CT showed post op changes in the right groin and otherwise showed no evidence of lymphoma.\par \par 10/12/2021- Bone marrow biopsy negative. 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 She has a significant history of DJD and has undergone bilateral shoulder arthroplasties along with a right hip arthroplasty which required a subsequent revision. She saw neurology after 3/1/21 right shoulder surgery b/o right arm pain and was diagnosed as having a complex regional pain syndrome. She has a h/o migraine headaches responsive to medication.\par \par Today she presents with her spouse in initial consultation. Denies any constitutional symptoms and no respiratory complaints.\par \par \par \par

## 2021-12-06 NOTE — HISTORY OF PRESENT ILLNESS
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 No complaints. [de-identified] : Ms. Lowenberg is a 76-year-old woman with newly diagnosed Follicular Lymphoma  \par \par In 7/2021, she felt a firm, non-tender  lymph node in the right inguinal area. Her PCP Dr. Zepeda sent her for U/S-7/19/2021 which showed a 1.7 cm lymph node not further characterized. \par \par 8/16/2021- CT of the A/P  showed the node to measure 0.9 x 1.4 cm, with possible enhancement which was thought to be "somewhat unusual.". There was no other LAD and there was no organomegaly.  There was a small left renal calculus, small umbilical hernia.  Increased reticular markings were seen at the lung bases, with tiny subpleural nodules at the right lung base thought nonspecific.  CT of chest with contrast showed nonspecific lung nodules, patchy consolidation in the RML with tree-in-bud opacities thought to be inflammatory or infectious.  Scattered foci of mucous plugging were seen, as well.  Mild changes consistent with emphysema were seen.\par  \par She had an excisional biopsy on 9/20/2021 that showed follicular lymphoma, grade 1-2.  Ki-67 was 5-10%.  Flow showed a monoclonal lambda CD 20+/CD 10+ B cell population.\par \par 10/01/2021- PET/CT showed post op changes in the right groin and otherwise showed no evidence of lymphoma.\par \par 10/12/2021- Bone marrow biopsy negative. 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 She has a significant history of DJD and has undergone bilateral shoulder arthroplasties along with a right hip arthroplasty which required a subsequent revision. She saw neurology after 3/1/21 right shoulder surgery b/o right arm pain and was diagnosed as having a complex regional pain syndrome. She has a h/o migraine headaches responsive to medication.\par \par Today she presents with her spouse in initial consultation. Denies any constitutional symptoms and no respiratory complaints.\par \par \par \par

## 2021-12-22 ENCOUNTER — TRANSCRIPTION ENCOUNTER (OUTPATIENT)
Age: 76
End: 2021-12-22

## 2022-01-01 NOTE — DISCHARGE NOTE NURSING/CASE MANAGEMENT/SOCIAL WORK - HAS THE PATIENT USED TOBACCO IN THE PAST 30 DAYS?
No
CCHD Screen [11-26]: Initial  Pre-Ductal SpO2(%): 100  Post-Ductal SpO2(%): 100  SpO2 Difference(Pre MINUS Post): 0  Extremities Used: Right Hand,Left Foot  Result: Passed  Follow up: Normal Screen- (No follow-up needed)

## 2022-01-01 NOTE — H&P PST ADULT - NS PRO FEM REPRO HEALTH SCREEN
Pt seen ruben examined. No reported issues. Doing well    Infant appears active, with normal color, normal  cry.    Skin is intact, no lesions. No jaundice.    Scalp is normal with open, soft, flat fontanels, normal sutures, no edema or hematoma.    Nares patent b/l, palate intact, lips and tongue normal.    Normal spontaneous respirations with no retractions, clear to auscultation b/l.    Strong, regular heart beat with no murmur.    Abdomen soft, non distended, normal bowel sounds, no masses palpated.    Hip exam wnl    No midline spinal defect    Good tone, no lethargy, normal cry    Genitals normal male, testes descended b/l    Site: Forehead (12 May 2022 20:44)  Bilirubin: 4.5 (12 May 2022 20:44)  Bilirubin Comment: @ 25 hol, LR (12 May 2022 20:44)    A/P Well , cleared for discharge home to mother:  -Breast feed or formula ad chary, at least every 2-3 hours  -F/u with pediatrician in 2-3 days  -d/w mom (- mom's friend bonnie over phone) mammogram

## 2022-01-11 ENCOUNTER — APPOINTMENT (OUTPATIENT)
Dept: RADIATION ONCOLOGY | Facility: CLINIC | Age: 77
End: 2022-01-11
Payer: MEDICARE

## 2022-01-11 PROCEDURE — 99024 POSTOP FOLLOW-UP VISIT: CPT | Mod: GC

## 2022-01-11 NOTE — HISTORY OF PRESENT ILLNESS
[Home] : at home, [unfilled] , at the time of the visit. [Medical Office: ([facility])___] : at the medical office located in  [Verbal consent obtained from patient] : the patient, [unfilled] [FreeTextEntry1] : Ms. Lowenberg is a 76 year old female with [place] Stage IB, grade 1-2 B, cell Follicular Lymphoma of the right inguinal LN, s/p excision. She has completed course of radiation therapy to a total dose of 2,400cGy to the right groin in 12 fractions from 11/15/21 - 12/1/21.\par \par She presents today for post-treatment evaluation.\par \par SHe states that she is back to her baseline functioning and energy level.  She states that she does not have side effects after the radiation treatment.\par \par Colonoscopy scheduled for February 2022, Dr. Arrington. She noticed increased gas before starting radiation therapy.\par \par She denies any changes in urination and bowel habits. She denies any pain.\par \par

## 2022-01-27 PROBLEM — R92.8 ABNORMAL FINDING ON BREAST IMAGING: Status: ACTIVE | Noted: 2022-01-27

## 2022-01-31 ENCOUNTER — NON-APPOINTMENT (OUTPATIENT)
Age: 77
End: 2022-01-31

## 2022-01-31 DIAGNOSIS — M81.0 AGE-RELATED OSTEOPOROSIS W/OUT CURRENT PATHOLOGICAL FRACTURE: ICD-10-CM

## 2022-01-31 DIAGNOSIS — Z98.890 OTHER SPECIFIED POSTPROCEDURAL STATES: ICD-10-CM

## 2022-01-31 DIAGNOSIS — Z80.3 FAMILY HISTORY OF MALIGNANT NEOPLASM OF BREAST: ICD-10-CM

## 2022-01-31 DIAGNOSIS — Z92.89 PERSONAL HISTORY OF OTHER MEDICAL TREATMENT: ICD-10-CM

## 2022-02-07 ENCOUNTER — TRANSCRIPTION ENCOUNTER (OUTPATIENT)
Age: 77
End: 2022-02-07

## 2022-02-14 ENCOUNTER — APPOINTMENT (OUTPATIENT)
Dept: SURGERY | Facility: CLINIC | Age: 77
End: 2022-02-14
Payer: MEDICARE

## 2022-02-14 VITALS — TEMPERATURE: 199.94 F

## 2022-02-14 PROCEDURE — 99212 OFFICE O/P EST SF 10 MIN: CPT

## 2022-02-14 NOTE — PHYSICAL EXAM
[Normal Breath Sounds] : Normal breath sounds [Normal Heart Sounds] : normal heart sounds [Normal Rate and Rhythm] : normal rate and rhythm [de-identified] : well developed white female in no acute distress [Calm] : calm [de-identified] : benign [de-identified] : Incision clean and closed, without adenopathy [de-identified] : without calf pain or swelling

## 2022-02-14 NOTE — HISTORY OF PRESENT ILLNESS
[de-identified] : s/p right inguinal lymph node biopsy on 9/20/21 [de-identified] : pt has completed her RT and is here for a check. She denies any lumps or swelling, Denies fevers or chills or unexplained weight loss.

## 2022-02-24 ENCOUNTER — OUTPATIENT (OUTPATIENT)
Dept: OUTPATIENT SERVICES | Facility: HOSPITAL | Age: 77
LOS: 1 days | Discharge: HOME | End: 2022-02-24

## 2022-02-24 DIAGNOSIS — Z96.612 PRESENCE OF LEFT ARTIFICIAL SHOULDER JOINT: Chronic | ICD-10-CM

## 2022-02-24 DIAGNOSIS — Z96.641 PRESENCE OF RIGHT ARTIFICIAL HIP JOINT: Chronic | ICD-10-CM

## 2022-02-24 DIAGNOSIS — Z96.649 PRESENCE OF UNSPECIFIED ARTIFICIAL HIP JOINT: Chronic | ICD-10-CM

## 2022-02-24 DIAGNOSIS — Z90.722 ACQUIRED ABSENCE OF OVARIES, BILATERAL: Chronic | ICD-10-CM

## 2022-02-24 DIAGNOSIS — C85.90 NON-HODGKIN LYMPHOMA, UNSPECIFIED, UNSPECIFIED SITE: ICD-10-CM

## 2022-02-28 ENCOUNTER — RESULT REVIEW (OUTPATIENT)
Age: 77
End: 2022-02-28

## 2022-02-28 ENCOUNTER — LABORATORY RESULT (OUTPATIENT)
Age: 77
End: 2022-02-28

## 2022-02-28 ENCOUNTER — APPOINTMENT (OUTPATIENT)
Dept: HEMATOLOGY ONCOLOGY | Facility: CLINIC | Age: 77
End: 2022-02-28
Payer: MEDICARE

## 2022-02-28 VITALS
HEIGHT: 66.14 IN | RESPIRATION RATE: 16 BRPM | SYSTOLIC BLOOD PRESSURE: 110 MMHG | BODY MASS INDEX: 21.79 KG/M2 | OXYGEN SATURATION: 98 % | HEART RATE: 60 BPM | TEMPERATURE: 208.22 F | WEIGHT: 135.56 LBS | DIASTOLIC BLOOD PRESSURE: 72 MMHG

## 2022-02-28 LAB
BASOPHILS # BLD AUTO: 0.05 K/UL — SIGNIFICANT CHANGE UP (ref 0–0.2)
BASOPHILS NFR BLD AUTO: 1.2 % — SIGNIFICANT CHANGE UP (ref 0–2)
EOSINOPHIL # BLD AUTO: 0.13 K/UL — SIGNIFICANT CHANGE UP (ref 0–0.5)
EOSINOPHIL NFR BLD AUTO: 3 % — SIGNIFICANT CHANGE UP (ref 0–6)
HCT VFR BLD CALC: 40.1 % — SIGNIFICANT CHANGE UP (ref 34.5–45)
HGB BLD-MCNC: 13.2 G/DL — SIGNIFICANT CHANGE UP (ref 11.5–15.5)
IMM GRANULOCYTES NFR BLD AUTO: 0.2 % — SIGNIFICANT CHANGE UP (ref 0–1.5)
LYMPHOCYTES # BLD AUTO: 0.93 K/UL — LOW (ref 1–3.3)
LYMPHOCYTES # BLD AUTO: 21.6 % — SIGNIFICANT CHANGE UP (ref 13–44)
MCHC RBC-ENTMCNC: 30.6 PG — SIGNIFICANT CHANGE UP (ref 27–34)
MCHC RBC-ENTMCNC: 32.9 G/DL — SIGNIFICANT CHANGE UP (ref 32–36)
MCV RBC AUTO: 93 FL — SIGNIFICANT CHANGE UP (ref 80–100)
MONOCYTES # BLD AUTO: 0.52 K/UL — SIGNIFICANT CHANGE UP (ref 0–0.9)
MONOCYTES NFR BLD AUTO: 12.1 % — SIGNIFICANT CHANGE UP (ref 2–14)
NEUTROPHILS # BLD AUTO: 2.66 K/UL — SIGNIFICANT CHANGE UP (ref 1.8–7.4)
NEUTROPHILS NFR BLD AUTO: 61.9 % — SIGNIFICANT CHANGE UP (ref 43–77)
NRBC # BLD: 0 /100 WBCS — SIGNIFICANT CHANGE UP (ref 0–0)
PLATELET # BLD AUTO: 146 K/UL — LOW (ref 150–400)
RBC # BLD: 4.31 M/UL — SIGNIFICANT CHANGE UP (ref 3.8–5.2)
RBC # FLD: 13.1 % — SIGNIFICANT CHANGE UP (ref 10.3–14.5)
WBC # BLD: 4.3 K/UL — SIGNIFICANT CHANGE UP (ref 3.8–10.5)
WBC # FLD AUTO: 4.3 K/UL — SIGNIFICANT CHANGE UP (ref 3.8–10.5)

## 2022-02-28 PROCEDURE — 99214 OFFICE O/P EST MOD 30 MIN: CPT

## 2022-03-10 ENCOUNTER — APPOINTMENT (OUTPATIENT)
Dept: CT IMAGING | Facility: CLINIC | Age: 77
End: 2022-03-10
Payer: MEDICARE

## 2022-03-10 ENCOUNTER — OUTPATIENT (OUTPATIENT)
Dept: OUTPATIENT SERVICES | Facility: HOSPITAL | Age: 77
LOS: 1 days | End: 2022-03-10
Payer: MEDICARE

## 2022-03-10 DIAGNOSIS — Z96.612 PRESENCE OF LEFT ARTIFICIAL SHOULDER JOINT: Chronic | ICD-10-CM

## 2022-03-10 DIAGNOSIS — Z90.722 ACQUIRED ABSENCE OF OVARIES, BILATERAL: Chronic | ICD-10-CM

## 2022-03-10 DIAGNOSIS — Z96.641 PRESENCE OF RIGHT ARTIFICIAL HIP JOINT: Chronic | ICD-10-CM

## 2022-03-10 DIAGNOSIS — Z00.8 ENCOUNTER FOR OTHER GENERAL EXAMINATION: ICD-10-CM

## 2022-03-10 DIAGNOSIS — Z96.649 PRESENCE OF UNSPECIFIED ARTIFICIAL HIP JOINT: Chronic | ICD-10-CM

## 2022-03-10 PROCEDURE — 74261 CT COLONOGRAPHY DX: CPT | Mod: 26,MH

## 2022-03-13 NOTE — REVIEW OF SYSTEMS
[Fatigue] : fatigue [Negative] : Allergic/Immunologic [Abdominal Pain] : no abdominal pain [Vomiting] : no vomiting [Dizziness] : no dizziness [Difficulty Walking] : no difficulty walking [FreeTextEntry7] : As above [de-identified] : left ulnar n damage, hyperesthesia RUE; numbness right thumb, stable complaint

## 2022-03-13 NOTE — ASSESSMENT
[Curative] : Goals of care discussed with patient: Curative [Palliative Care Plan] : not applicable at this time [FreeTextEntry1] : CSI FL, gr 1-2, no status post involved field radiotherapy completed 12/1/2021.\par She has no lymphoma related complaints and is doing well post radiation.  \par She has hypogammaglobulinemia without a history of increased respiratory infections.\par She has RML findings on chest CT and PET/CT thought to be inflammatory in nature.  She has no significant pulmonary complaints and has been seen by pulmonary.  She has tiny pulmonary nodules that were not FDG avid.\par CBC results reviewed and d/w pt\par WBC 4.30, Hgb 13.2, Plt 146K, nl diff except for minimal lymphocytopenia\par Mildly decr plt could be related to RT, not concerning.\par Will check CMP, LDH, beta-2 microglobulin, repeat Igs, Covid spike ag ab.  She couil;d be a\par candidate for treatment prophylactically with evusheld.\par RV 4 mos\par  \par \par \par

## 2022-03-13 NOTE — HISTORY OF PRESENT ILLNESS
[Disease:__________________________] : Disease: [unfilled] [de-identified] : Ms. Lowenberg is a 76-year-old woman with newly diagnosed CS I DLBCL.   In 7/2021, she felt a firm, non-tender  lymph node in the right inguinal area.  U/S showed a 1.7 cm lymph node not further characterized.  CT of the A/P been showed the node to measure 0.9X 1.4 cm, with possible enhancement which was thought to be "somewhat unusual.".  There was no other LAD and there was no organomegaly.  There was a small left renal calculus, small umbilical hernia.  Increased eticular markings were seen at the lung bases, with tiny subpleural nodules at the right lung base thought nonspecific.  CT of chest with contrast showed nonspecific lung nodules, patchy consolidation in the RML with tree-in-bud opacities thought to be inflammatory or infectious.  Scattered foci of mucous plugging were seen, as well.  Mild changes consistent with emphysema were seen.\par Excisional biopsy showed follicular lymphoma, grade 1-2.  Ki-67 was 5-10%.  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  She has a significant history of DJD and has undergone bilateral shoulder arthroplasties along with a right hip arthroplasty which required a subsequent revision. She saw neurology after 3/1/21 right shoulder surgery b/o right arm pain and was diagnosed as having a complex regional pain syndrome.\par She has a h/o migraine headaces esponsive to medication.\par \par \par \par  [de-identified] : I pending bone marrow [de-identified] : Stage I follicular lymphoma.\par Work-up showed only subcutaneous infiltration at site of right inguinal lymph node biopsy site.\par Bone marrow was negative with some increase in TCR gamma/delta T cells.  Cytogenetics was normal.  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  LDH and beta-2 microglobulin were normal.  She was treated by Dr. Perea with 2400 cGy to the involved field in 12 fractions from 11/15/2021 to 12/1/2021.\par Treatment was well-tolerated except for minimal left lower quadrant abdominal discomfort which has resolved.  She developed fatigue which is now improving.\par She continues to have no constitutional complaints.\par She continues to see neurology for what has been diagnosed with a complex regional pain syndrome affecting her right arm\par  [Date: ____________] : Patient's last distress assessment performed on [unfilled]. [3 - Distress Level] : Distress Level: 3

## 2022-03-13 NOTE — PHYSICAL EXAM
[Fully active, able to carry on all pre-disease performance without restriction] : Status 0 - Fully active, able to carry on all pre-disease performance without restriction [Normal] : affect appropriate [de-identified] : no CVAT [de-identified] : No RT-related changes [de-identified] : induration resolved in right inguinal LAD site

## 2022-03-20 ENCOUNTER — APPOINTMENT (OUTPATIENT)
Dept: NUCLEAR MEDICINE | Facility: CLINIC | Age: 77
End: 2022-03-20
Payer: MEDICARE

## 2022-03-20 ENCOUNTER — OUTPATIENT (OUTPATIENT)
Dept: OUTPATIENT SERVICES | Facility: HOSPITAL | Age: 77
LOS: 1 days | End: 2022-03-20
Payer: MEDICARE

## 2022-03-20 DIAGNOSIS — Z96.612 PRESENCE OF LEFT ARTIFICIAL SHOULDER JOINT: Chronic | ICD-10-CM

## 2022-03-20 DIAGNOSIS — Z90.722 ACQUIRED ABSENCE OF OVARIES, BILATERAL: Chronic | ICD-10-CM

## 2022-03-20 DIAGNOSIS — Z96.649 PRESENCE OF UNSPECIFIED ARTIFICIAL HIP JOINT: Chronic | ICD-10-CM

## 2022-03-20 DIAGNOSIS — Z00.8 ENCOUNTER FOR OTHER GENERAL EXAMINATION: ICD-10-CM

## 2022-03-20 DIAGNOSIS — C82.95: ICD-10-CM

## 2022-03-20 DIAGNOSIS — Z96.641 PRESENCE OF RIGHT ARTIFICIAL HIP JOINT: Chronic | ICD-10-CM

## 2022-03-20 PROCEDURE — 78815 PET IMAGE W/CT SKULL-THIGH: CPT

## 2022-03-20 PROCEDURE — A9552: CPT

## 2022-03-20 PROCEDURE — 74261 CT COLONOGRAPHY DX: CPT | Mod: MH

## 2022-03-21 ENCOUNTER — RESULT REVIEW (OUTPATIENT)
Age: 77
End: 2022-03-21

## 2022-03-21 PROCEDURE — 78815 PET IMAGE W/CT SKULL-THIGH: CPT | Mod: MH

## 2022-03-21 PROCEDURE — A9552: CPT

## 2022-03-21 PROCEDURE — 78815 PET IMAGE W/CT SKULL-THIGH: CPT | Mod: 26,PS,MH

## 2022-03-31 ENCOUNTER — TRANSCRIPTION ENCOUNTER (OUTPATIENT)
Age: 77
End: 2022-03-31

## 2022-04-22 ENCOUNTER — APPOINTMENT (OUTPATIENT)
Dept: MRI IMAGING | Facility: CLINIC | Age: 77
End: 2022-04-22
Payer: MEDICARE

## 2022-04-22 ENCOUNTER — APPOINTMENT (OUTPATIENT)
Dept: RADIOLOGY | Facility: CLINIC | Age: 77
End: 2022-04-22
Payer: MEDICARE

## 2022-04-22 ENCOUNTER — OUTPATIENT (OUTPATIENT)
Dept: OUTPATIENT SERVICES | Facility: HOSPITAL | Age: 77
LOS: 1 days | End: 2022-04-22
Payer: MEDICARE

## 2022-04-22 DIAGNOSIS — Z90.722 ACQUIRED ABSENCE OF OVARIES, BILATERAL: Chronic | ICD-10-CM

## 2022-04-22 DIAGNOSIS — Z00.8 ENCOUNTER FOR OTHER GENERAL EXAMINATION: ICD-10-CM

## 2022-04-22 DIAGNOSIS — Z96.612 PRESENCE OF LEFT ARTIFICIAL SHOULDER JOINT: Chronic | ICD-10-CM

## 2022-04-22 DIAGNOSIS — Z96.641 PRESENCE OF RIGHT ARTIFICIAL HIP JOINT: Chronic | ICD-10-CM

## 2022-04-22 DIAGNOSIS — Z96.649 PRESENCE OF UNSPECIFIED ARTIFICIAL HIP JOINT: Chronic | ICD-10-CM

## 2022-04-22 PROCEDURE — 72141 MRI NECK SPINE W/O DYE: CPT | Mod: 26,MH

## 2022-04-22 PROCEDURE — 72148 MRI LUMBAR SPINE W/O DYE: CPT | Mod: 26,MH

## 2022-04-22 PROCEDURE — 73521 X-RAY EXAM HIPS BI 2 VIEWS: CPT | Mod: 26

## 2022-04-22 PROCEDURE — 73521 X-RAY EXAM HIPS BI 2 VIEWS: CPT

## 2022-04-22 PROCEDURE — 72141 MRI NECK SPINE W/O DYE: CPT | Mod: MH

## 2022-04-22 PROCEDURE — 72148 MRI LUMBAR SPINE W/O DYE: CPT | Mod: MH

## 2022-05-27 ENCOUNTER — APPOINTMENT (OUTPATIENT)
Dept: OBGYN | Facility: CLINIC | Age: 77
End: 2022-05-27

## 2022-05-27 VITALS
HEIGHT: 66 IN | WEIGHT: 135 LBS | DIASTOLIC BLOOD PRESSURE: 68 MMHG | BODY MASS INDEX: 21.69 KG/M2 | SYSTOLIC BLOOD PRESSURE: 131 MMHG

## 2022-05-27 DIAGNOSIS — Z11.51 ENCOUNTER FOR SCREENING FOR HUMAN PAPILLOMAVIRUS (HPV): ICD-10-CM

## 2022-05-27 DIAGNOSIS — Z01.419 ENCOUNTER FOR GYNECOLOGICAL EXAMINATION (GENERAL) (ROUTINE) W/OUT ABNORMAL FINDINGS: ICD-10-CM

## 2022-05-27 PROCEDURE — G0101: CPT

## 2022-05-27 NOTE — HISTORY OF PRESENT ILLNESS
[Patient reported PAP Smear was normal] : Patient reported PAP Smear was normal [FreeTextEntry1] : 76 y/o  post-menopausal female presents today for annual exam. Pt reports she was recently diagnosed with NHL/ B cell lymphoma. She is s/p radiation therapy x12 in 2021. She is being followed by heme onc and rad onc. Pt discontinued Fosamax last year for her osteoporosis. She offers no complaints. \par \par OBHx: two children adopted\par PMHx: osteoarthritis, osteoporosis; NHL / B cell lymphoma\par SHx: LSC BSO; hip surgery; shoulder surgery; \par NKDA [Mammogramdate] : 9/2021 [TextBox_19] : BIRADS 3 [BreastSonogramDate] : 2/2022 [TextBox_25] : BIRADS 1 (f/u to birads 3) [PapSmeardate] : 5/2020 [BoneDensityDate] : 5/2020 [TextBox_37] : osteoporosis

## 2022-05-27 NOTE — PLAN
[FreeTextEntry1] : 78 y/o  post-menopausal female, annual exam\par \par HCM\par -pap done today\par -vit D/calcium/exercise\par -BMD with pcp\par - f/u pcp for osteo screening/mgmt\par -rx for breast mammo/sono\par -colon screening reviewed\par -f/u PCP for annual and appropriate immunizations\par -rto 1 year \par \par Bcell Lymphoma\par -f/u rad onc/heme onc

## 2022-05-27 NOTE — END OF VISIT
[FreeTextEntry3] : I, Larissa Donald, acted as a scribe on behalf for Dr. Mike Orellana on 05/27/2022.\par \par All medical entries made by the scribe were at my, Dr. Mike Orellana, direction and personally dictated by me on 05/27/2022. I have reviewed the chart and agree that the record accurately reflects my personal performance of the history, physical exam, assessment, and plan. I have personally directed, reviewed, and agreed with the chart.

## 2022-07-18 NOTE — PHYSICAL THERAPY INITIAL EVALUATION ADULT - PHYSICAL ASSIST/NONPHYSICAL ASSIST: SCOOT/BRIDGE, REHAB EVAL
[NL] : moves all extremities x4, warm, well perfused x4, capillary refill < 2s  nonverbal cues (demo/gestures)/supervision/verbal cues

## 2022-07-28 ENCOUNTER — OUTPATIENT (OUTPATIENT)
Dept: OUTPATIENT SERVICES | Facility: HOSPITAL | Age: 77
LOS: 1 days | Discharge: HOME | End: 2022-07-28

## 2022-07-28 DIAGNOSIS — Z90.722 ACQUIRED ABSENCE OF OVARIES, BILATERAL: Chronic | ICD-10-CM

## 2022-07-28 DIAGNOSIS — Z96.612 PRESENCE OF LEFT ARTIFICIAL SHOULDER JOINT: Chronic | ICD-10-CM

## 2022-07-28 DIAGNOSIS — Z96.641 PRESENCE OF RIGHT ARTIFICIAL HIP JOINT: Chronic | ICD-10-CM

## 2022-07-28 DIAGNOSIS — C85.90 NON-HODGKIN LYMPHOMA, UNSPECIFIED, UNSPECIFIED SITE: ICD-10-CM

## 2022-07-28 DIAGNOSIS — Z96.649 PRESENCE OF UNSPECIFIED ARTIFICIAL HIP JOINT: Chronic | ICD-10-CM

## 2022-08-02 NOTE — OCCUPATIONAL THERAPY INITIAL EVALUATION ADULT - LEVEL OF INDEPENDENCE: BED TO CHAIR, REHAB EVAL
independent Drysol Pregnancy And Lactation Text: This medication is considered safe during pregnancy and breast feeding.

## 2022-08-05 DIAGNOSIS — C82.90 FOLLICULAR LYMPHOMA, UNSPECIFIED, UNSPECIFIED SITE: ICD-10-CM

## 2022-08-08 ENCOUNTER — RESULT REVIEW (OUTPATIENT)
Age: 77
End: 2022-08-08

## 2022-08-08 ENCOUNTER — APPOINTMENT (OUTPATIENT)
Dept: HEMATOLOGY ONCOLOGY | Facility: CLINIC | Age: 77
End: 2022-08-08

## 2022-08-08 ENCOUNTER — LABORATORY RESULT (OUTPATIENT)
Age: 77
End: 2022-08-08

## 2022-08-08 ENCOUNTER — APPOINTMENT (OUTPATIENT)
Dept: SURGERY | Facility: CLINIC | Age: 77
End: 2022-08-08

## 2022-08-08 VITALS
RESPIRATION RATE: 15 BRPM | WEIGHT: 136.68 LBS | BODY MASS INDEX: 22.06 KG/M2 | TEMPERATURE: 97.2 F | OXYGEN SATURATION: 97 % | HEART RATE: 51 BPM | DIASTOLIC BLOOD PRESSURE: 71 MMHG | SYSTOLIC BLOOD PRESSURE: 121 MMHG

## 2022-08-08 LAB
BASOPHILS # BLD AUTO: 0.04 K/UL — SIGNIFICANT CHANGE UP (ref 0–0.2)
BASOPHILS NFR BLD AUTO: 0.9 % — SIGNIFICANT CHANGE UP (ref 0–2)
EOSINOPHIL # BLD AUTO: 0.13 K/UL — SIGNIFICANT CHANGE UP (ref 0–0.5)
EOSINOPHIL NFR BLD AUTO: 2.8 % — SIGNIFICANT CHANGE UP (ref 0–6)
HCT VFR BLD CALC: 39.3 % — SIGNIFICANT CHANGE UP (ref 34.5–45)
HGB BLD-MCNC: 13.3 G/DL — SIGNIFICANT CHANGE UP (ref 11.5–15.5)
IMM GRANULOCYTES NFR BLD AUTO: 0 % — SIGNIFICANT CHANGE UP (ref 0–1.5)
LYMPHOCYTES # BLD AUTO: 1.34 K/UL — SIGNIFICANT CHANGE UP (ref 1–3.3)
LYMPHOCYTES # BLD AUTO: 28.9 % — SIGNIFICANT CHANGE UP (ref 13–44)
MCHC RBC-ENTMCNC: 31.7 PG — SIGNIFICANT CHANGE UP (ref 27–34)
MCHC RBC-ENTMCNC: 33.8 G/DL — SIGNIFICANT CHANGE UP (ref 32–36)
MCV RBC AUTO: 93.6 FL — SIGNIFICANT CHANGE UP (ref 80–100)
MONOCYTES # BLD AUTO: 0.46 K/UL — SIGNIFICANT CHANGE UP (ref 0–0.9)
MONOCYTES NFR BLD AUTO: 9.9 % — SIGNIFICANT CHANGE UP (ref 2–14)
NEUTROPHILS # BLD AUTO: 2.67 K/UL — SIGNIFICANT CHANGE UP (ref 1.8–7.4)
NEUTROPHILS NFR BLD AUTO: 57.5 % — SIGNIFICANT CHANGE UP (ref 43–77)
NRBC # BLD: 0 /100 WBCS — SIGNIFICANT CHANGE UP (ref 0–0)
PLATELET # BLD AUTO: 138 K/UL — LOW (ref 150–400)
RBC # BLD: 4.2 M/UL — SIGNIFICANT CHANGE UP (ref 3.8–5.2)
RBC # FLD: 13.2 % — SIGNIFICANT CHANGE UP (ref 10.3–14.5)
WBC # BLD: 4.64 K/UL — SIGNIFICANT CHANGE UP (ref 3.8–10.5)
WBC # FLD AUTO: 4.64 K/UL — SIGNIFICANT CHANGE UP (ref 3.8–10.5)

## 2022-08-08 PROCEDURE — 99214 OFFICE O/P EST MOD 30 MIN: CPT

## 2022-08-29 ENCOUNTER — RX RENEWAL (OUTPATIENT)
Age: 77
End: 2022-08-29

## 2022-09-04 NOTE — HISTORY OF PRESENT ILLNESS
[Disease:__________________________] : Disease: [unfilled] [de-identified] : Ms. Lowenberg is a 76-year-old woman with newly diagnosed CS I DLBCL.   In 7/2021, she felt a firm, non-tender  lymph node in the right inguinal area.  U/S showed a 1.7 cm lymph node not further characterized.  CT of the A/P been showed the node to measure 0.9X 1.4 cm, with possible enhancement which was thought to be "somewhat unusual.".  There was no other LAD and there was no organomegaly.  There was a small left renal calculus, small umbilical hernia.  Increased eticular markings were seen at the lung bases, with tiny subpleural nodules at the right lung base thought nonspecific.  CT of chest with contrast showed nonspecific lung nodules, patchy consolidation in the RML with tree-in-bud opacities thought to be inflammatory or infectious.  Scattered foci of mucous plugging were seen, as well.  Mild changes consistent with emphysema were seen.\par Excisional biopsy showed follicular lymphoma, grade 1-2.  Ki-67 was 5-10%.  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  She has a significant history of DJD and has undergone bilateral shoulder arthroplasties along with a right hip arthroplasty which required a subsequent revision. She saw neurology after 3/1/21 right shoulder surgery b/o right arm pain and was diagnosed as having a complex regional pain syndrome.\par She has a h/o migraine headaces esponsive to medication.\par \par \par \par  [de-identified] : I pending bone marrow [de-identified] : Stage I follicular lymphoma.\par Work-up showed only subcutaneous infiltration at site of right inguinal lymph node biopsy site.\par Bone marrow was negative with some increase in TCR gamma/delta T cells.  Cytogenetics was normal.  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  LDH and beta-2 microglobulin were normal.  She was treated by Dr. Perea with 2400 cGy to the involved field in 12 fractions from 11/15/2021 to 12/1/2021.\par Treatment was well-tolerated except for minimal left lower quadrant abdominal discomfort which has resolved.  She developed fatigue which is now improving.\par She continues to have no constitutional complaints.\par She continues to see neurology for what has been diagnosed with a complex regional pain syndrome affecting her right arm\par hypogamma - no recent resp infection\par COVID x2 vaccine and with x 2 booster\par abd pain gas pain better with increase fiber \par \par

## 2022-09-04 NOTE — ASSESSMENT
[Palliative Care Plan] : not applicable at this time [Curative] : Goals of care discussed with patient: Curative [FreeTextEntry1] : CSI FL, gr 1-2, no status post involved field radiotherapy completed 12/1/2021.\par She has no lymphoma related complaints and is doing well post radiation.  \par She has hypogammaglobulinemia without a history of increased respiratory infections.\par She has RML findings on chest CT and PET/CT thought to be inflammatory in nature.  She has no significant pulmonary complaints and has been seen by pulmonary.  She has tiny pulmonary nodules that were not FDG avid.\par CBC results reviewed and d/w pt\par Mildly decr plt could be related to RT, not concerning.\par Will check CMP, LDH, beta-2 microglobulin, repeat Igs,   She couil;d be a\par candidate for treatment prophylactically with evusheld.\par RV 4 mos\par  \par \par \par

## 2022-09-04 NOTE — REVIEW OF SYSTEMS
[Fatigue] : fatigue [Recent Change In Weight] : ~T recent weight change [Negative] : Psychiatric [FreeTextEntry6] : pulm nodules being follow  [FreeTextEntry7] : abd pain gas GI Thursday [FreeTextEntry9] : arthritis shoulder x2  hip x 1 replacement

## 2022-10-17 NOTE — PHYSICAL EXAM
CT/XR notified    [de-identified] : Patient is well nourished, well-developed, in no acute distress, with appropriate mood and affect. The patient is oriented to time, place, and person. Respirations are even and unlabored. Gait evaluation reveals a limp. There is no inguinal adenopathy. Examination of the contralateral hip shows normal range of motion, strength, no tenderness, and intact skin. The affected limb is well-perfused, shows a grossly normal motor and sensory examination. Examination of the hip shows no skin lesions. Hip motion is reduced and causes pain. Leg lengths are approximately short on the right by 1 cm. Stinchfield test is positive. Both hips are stable and muscle strength is normal.  Right knee motion is painless and the knee moves from 0-135 degrees. The knee is stable within that range-of-motion to AP and ML stress with a 1A Lachman, negative anterior or posterior drawer and no instability to varus or valgus stress. The alignment of the knee is 5 degrees of varus. No effusion or crepitus is noted. Tender to palpation in the medial joint line. No tenderness to palpation about the lateral joint line, medial or lateral tibial plateau, medial or lateral femoral condyle, medial or lateral patellar facets, superior or inferior pole of the patella. Lavell's is negative. Muscle strength is normal. Pedal pulses are palpable.  [de-identified] : AP and lateral x-rays of the right hip, pelvis, and femur were reviewed from the previous visit and demonstrate degenerative joint disease of the hip with joint space narrowing, osteophyte formation, and subchondral sclerosis.\par

## 2022-12-01 ENCOUNTER — OUTPATIENT (OUTPATIENT)
Dept: OUTPATIENT SERVICES | Facility: HOSPITAL | Age: 77
LOS: 1 days | Discharge: HOME | End: 2022-12-01

## 2022-12-01 DIAGNOSIS — Z96.641 PRESENCE OF RIGHT ARTIFICIAL HIP JOINT: Chronic | ICD-10-CM

## 2022-12-01 DIAGNOSIS — C85.90 NON-HODGKIN LYMPHOMA, UNSPECIFIED, UNSPECIFIED SITE: ICD-10-CM

## 2022-12-01 DIAGNOSIS — Z96.649 PRESENCE OF UNSPECIFIED ARTIFICIAL HIP JOINT: Chronic | ICD-10-CM

## 2022-12-01 DIAGNOSIS — Z96.612 PRESENCE OF LEFT ARTIFICIAL SHOULDER JOINT: Chronic | ICD-10-CM

## 2022-12-01 DIAGNOSIS — Z90.722 ACQUIRED ABSENCE OF OVARIES, BILATERAL: Chronic | ICD-10-CM

## 2022-12-05 ENCOUNTER — RESULT REVIEW (OUTPATIENT)
Age: 77
End: 2022-12-05

## 2022-12-05 ENCOUNTER — APPOINTMENT (OUTPATIENT)
Dept: HEMATOLOGY ONCOLOGY | Facility: CLINIC | Age: 77
End: 2022-12-05

## 2022-12-05 VITALS
SYSTOLIC BLOOD PRESSURE: 124 MMHG | HEART RATE: 78 BPM | BODY MASS INDEX: 21.44 KG/M2 | TEMPERATURE: 97.5 F | OXYGEN SATURATION: 98 % | RESPIRATION RATE: 16 BRPM | WEIGHT: 133.38 LBS | DIASTOLIC BLOOD PRESSURE: 78 MMHG | HEIGHT: 66.02 IN

## 2022-12-05 DIAGNOSIS — Z96.641 PRESENCE OF RIGHT ARTIFICIAL HIP JOINT: ICD-10-CM

## 2022-12-05 DIAGNOSIS — M19.012 PRIMARY OSTEOARTHRITIS, LEFT SHOULDER: ICD-10-CM

## 2022-12-05 LAB
BASOPHILS # BLD AUTO: 0.06 K/UL — SIGNIFICANT CHANGE UP (ref 0–0.2)
BASOPHILS NFR BLD AUTO: 1.5 % — SIGNIFICANT CHANGE UP (ref 0–2)
EOSINOPHIL # BLD AUTO: 0.09 K/UL — SIGNIFICANT CHANGE UP (ref 0–0.5)
EOSINOPHIL NFR BLD AUTO: 2.2 % — SIGNIFICANT CHANGE UP (ref 0–6)
HCT VFR BLD CALC: 40.9 % — SIGNIFICANT CHANGE UP (ref 34.5–45)
HGB BLD-MCNC: 13.6 G/DL — SIGNIFICANT CHANGE UP (ref 11.5–15.5)
IMM GRANULOCYTES NFR BLD AUTO: 0.2 % — SIGNIFICANT CHANGE UP (ref 0–0.9)
LYMPHOCYTES # BLD AUTO: 1.01 K/UL — SIGNIFICANT CHANGE UP (ref 1–3.3)
LYMPHOCYTES # BLD AUTO: 25 % — SIGNIFICANT CHANGE UP (ref 13–44)
MCHC RBC-ENTMCNC: 31 PG — SIGNIFICANT CHANGE UP (ref 27–34)
MCHC RBC-ENTMCNC: 33.3 G/DL — SIGNIFICANT CHANGE UP (ref 32–36)
MCV RBC AUTO: 93.2 FL — SIGNIFICANT CHANGE UP (ref 80–100)
MONOCYTES # BLD AUTO: 0.45 K/UL — SIGNIFICANT CHANGE UP (ref 0–0.9)
MONOCYTES NFR BLD AUTO: 11.1 % — SIGNIFICANT CHANGE UP (ref 2–14)
NEUTROPHILS # BLD AUTO: 2.42 K/UL — SIGNIFICANT CHANGE UP (ref 1.8–7.4)
NEUTROPHILS NFR BLD AUTO: 60 % — SIGNIFICANT CHANGE UP (ref 43–77)
NRBC # BLD: 0 /100 WBCS — SIGNIFICANT CHANGE UP (ref 0–0)
PLATELET # BLD AUTO: 149 K/UL — LOW (ref 150–400)
RBC # BLD: 4.39 M/UL — SIGNIFICANT CHANGE UP (ref 3.8–5.2)
RBC # FLD: 12.8 % — SIGNIFICANT CHANGE UP (ref 10.3–14.5)
WBC # BLD: 4.04 K/UL — SIGNIFICANT CHANGE UP (ref 3.8–10.5)
WBC # FLD AUTO: 4.04 K/UL — SIGNIFICANT CHANGE UP (ref 3.8–10.5)

## 2022-12-05 PROCEDURE — 99214 OFFICE O/P EST MOD 30 MIN: CPT

## 2022-12-05 RX ORDER — LINACLOTIDE 145 UG/1
145 CAPSULE, GELATIN COATED ORAL
Qty: 30 | Refills: 0 | Status: ACTIVE | COMMUNITY
Start: 2022-09-08

## 2022-12-05 NOTE — HISTORY OF PRESENT ILLNESS
[Disease:__________________________] : Disease: [unfilled] [de-identified] : Ms. Lowenberg is a 76-year-old woman with newly diagnosed CS I DLBCL.   In 7/2021, she felt a firm, non-tender  lymph node in the right inguinal area.  U/S showed a 1.7 cm lymph node not further characterized.  CT of the A/P been showed the node to measure 0.9X 1.4 cm, with possible enhancement which was thought to be "somewhat unusual.".  There was no other LAD and there was no organomegaly.  There was a small left renal calculus, small umbilical hernia.  Increased eticular markings were seen at the lung bases, with tiny subpleural nodules at the right lung base thought nonspecific.  CT of chest with contrast showed nonspecific lung nodules, patchy consolidation in the RML with tree-in-bud opacities thought to be inflammatory or infectious.  Scattered foci of mucous plugging were seen, as well.  Mild changes consistent with emphysema were seen.\par Excisional biopsy showed follicular lymphoma, grade 1-2.  Ki-67 was 5-10%.  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  She has a significant history of DJD and has undergone bilateral shoulder arthroplasties along with a right hip arthroplasty which required a subsequent revision. She saw neurology after 3/1/21 right shoulder surgery b/o right arm pain and was diagnosed as having a complex regional pain syndrome.\par She has a h/o migraine headaces esponsive to medication.\par \par \par \par  [de-identified] : I pending bone marrow [de-identified] : Stage I follicular lymphoma.\par Work-up showed only subcutaneous infiltration at site of right inguinal lymph node biopsy site.\par Bone marrow was negative with some increase in TCR gamma/delta T cells.  Cytogenetics was normal.  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  LDH and beta-2 microglobulin were normal.  She was treated by Dr. Perea with 2400 cGy to the involved field in 12 fractions from 11/15/2021 to 12/1/2021.\par Treatment was well-tolerated except for minimal left lower quadrant abdominal discomfort which has resolved.  She developed fatigue which is now improving.\par She continues to have no constitutional complaints.\par She has been diagnosed with a complex regional pain syndrome affecting her right arm\par hypogamma - no recent resp infection\par COVID x2 vaccine and with x 2 booster\par abd pain gas pain better with increase fiber \par \par

## 2022-12-05 NOTE — PHYSICAL EXAM
[Fully active, able to carry on all pre-disease performance without restriction] : Status 0 - Fully active, able to carry on all pre-disease performance without restriction [Normal] : normal spine exam without palpable tenderness, no kyphosis or scoliosis [de-identified] : no CVAT

## 2022-12-05 NOTE — ASSESSMENT
[Curative] : Goals of care discussed with patient: Curative [Palliative Care Plan] : not applicable at this time [FreeTextEntry1] : CSI FL, gr 1-2, no status post involved field radiotherapy completed 12/1/2021.\par She has no lymphoma related complaints and is doing well post radiation.  \par  \par She has RML findings on chest CT and PET/CT thought to be inflammatory in nature.  She has no significant pulmonary complaints and has been seen by pulmonary.  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

## 2022-12-05 NOTE — REVIEW OF SYSTEMS
[Fatigue] : fatigue [Recent Change In Weight] : ~T recent weight change [Negative] : Allergic/Immunologic [FreeTextEntry6] : pulm nodules being followed  [FreeTextEntry7] : abd pain gas GI Thursday [FreeTextEntry9] : arthritis shoulder x2  had right THR, now left hip symptomatic

## 2023-02-28 NOTE — OCCUPATIONAL THERAPY INITIAL EVALUATION ADULT - ASR WT BEARING STATUS EVAL
Right LE Libtayo Pregnancy And Lactation Text: This medication is contraindicated in pregnancy and when breast feeding.

## 2023-05-18 ENCOUNTER — OUTPATIENT (OUTPATIENT)
Dept: OUTPATIENT SERVICES | Facility: HOSPITAL | Age: 78
LOS: 1 days | Discharge: HOME | End: 2023-05-18

## 2023-05-18 DIAGNOSIS — C85.90 NON-HODGKIN LYMPHOMA, UNSPECIFIED, UNSPECIFIED SITE: ICD-10-CM

## 2023-05-18 DIAGNOSIS — Z96.641 PRESENCE OF RIGHT ARTIFICIAL HIP JOINT: Chronic | ICD-10-CM

## 2023-05-18 DIAGNOSIS — Z90.722 ACQUIRED ABSENCE OF OVARIES, BILATERAL: Chronic | ICD-10-CM

## 2023-05-18 DIAGNOSIS — Z96.612 PRESENCE OF LEFT ARTIFICIAL SHOULDER JOINT: Chronic | ICD-10-CM

## 2023-05-18 DIAGNOSIS — Z96.649 PRESENCE OF UNSPECIFIED ARTIFICIAL HIP JOINT: Chronic | ICD-10-CM

## 2023-05-19 ENCOUNTER — NON-APPOINTMENT (OUTPATIENT)
Age: 78
End: 2023-05-19

## 2023-06-02 ENCOUNTER — APPOINTMENT (OUTPATIENT)
Dept: RADIOLOGY | Facility: CLINIC | Age: 78
End: 2023-06-02
Payer: MEDICARE

## 2023-06-02 ENCOUNTER — OUTPATIENT (OUTPATIENT)
Dept: OUTPATIENT SERVICES | Facility: HOSPITAL | Age: 78
LOS: 1 days | End: 2023-06-02
Payer: MEDICARE

## 2023-06-02 DIAGNOSIS — Z90.722 ACQUIRED ABSENCE OF OVARIES, BILATERAL: Chronic | ICD-10-CM

## 2023-06-02 DIAGNOSIS — Z00.8 ENCOUNTER FOR OTHER GENERAL EXAMINATION: ICD-10-CM

## 2023-06-02 DIAGNOSIS — Z96.649 PRESENCE OF UNSPECIFIED ARTIFICIAL HIP JOINT: Chronic | ICD-10-CM

## 2023-06-02 DIAGNOSIS — Z96.641 PRESENCE OF RIGHT ARTIFICIAL HIP JOINT: Chronic | ICD-10-CM

## 2023-06-02 DIAGNOSIS — Z96.612 PRESENCE OF LEFT ARTIFICIAL SHOULDER JOINT: Chronic | ICD-10-CM

## 2023-06-02 PROCEDURE — 73502 X-RAY EXAM HIP UNI 2-3 VIEWS: CPT | Mod: 26

## 2023-06-02 PROCEDURE — 73502 X-RAY EXAM HIP UNI 2-3 VIEWS: CPT

## 2023-06-05 ENCOUNTER — RESULT REVIEW (OUTPATIENT)
Age: 78
End: 2023-06-05

## 2023-06-05 ENCOUNTER — APPOINTMENT (OUTPATIENT)
Dept: HEMATOLOGY ONCOLOGY | Facility: CLINIC | Age: 78
End: 2023-06-05
Payer: MEDICARE

## 2023-06-05 VITALS
DIASTOLIC BLOOD PRESSURE: 74 MMHG | HEART RATE: 68 BPM | BODY MASS INDEX: 21.34 KG/M2 | WEIGHT: 132.28 LBS | OXYGEN SATURATION: 97 % | SYSTOLIC BLOOD PRESSURE: 118 MMHG | TEMPERATURE: 97.7 F | RESPIRATION RATE: 16 BRPM

## 2023-06-05 LAB
BASOPHILS # BLD AUTO: 0.07 K/UL — SIGNIFICANT CHANGE UP (ref 0–0.2)
BASOPHILS NFR BLD AUTO: 1.2 % — SIGNIFICANT CHANGE UP (ref 0–2)
EOSINOPHIL # BLD AUTO: 0.15 K/UL — SIGNIFICANT CHANGE UP (ref 0–0.5)
EOSINOPHIL NFR BLD AUTO: 2.6 % — SIGNIFICANT CHANGE UP (ref 0–6)
HCT VFR BLD CALC: 39.6 % — SIGNIFICANT CHANGE UP (ref 34.5–45)
HGB BLD-MCNC: 13.2 G/DL — SIGNIFICANT CHANGE UP (ref 11.5–15.5)
IMM GRANULOCYTES NFR BLD AUTO: 0.5 % — SIGNIFICANT CHANGE UP (ref 0–0.9)
LYMPHOCYTES # BLD AUTO: 1.2 K/UL — SIGNIFICANT CHANGE UP (ref 1–3.3)
LYMPHOCYTES # BLD AUTO: 21.2 % — SIGNIFICANT CHANGE UP (ref 13–44)
MCHC RBC-ENTMCNC: 30.6 PG — SIGNIFICANT CHANGE UP (ref 27–34)
MCHC RBC-ENTMCNC: 33.3 G/DL — SIGNIFICANT CHANGE UP (ref 32–36)
MCV RBC AUTO: 91.7 FL — SIGNIFICANT CHANGE UP (ref 80–100)
MONOCYTES # BLD AUTO: 0.55 K/UL — SIGNIFICANT CHANGE UP (ref 0–0.9)
MONOCYTES NFR BLD AUTO: 9.7 % — SIGNIFICANT CHANGE UP (ref 2–14)
NEUTROPHILS # BLD AUTO: 3.67 K/UL — SIGNIFICANT CHANGE UP (ref 1.8–7.4)
NEUTROPHILS NFR BLD AUTO: 64.8 % — SIGNIFICANT CHANGE UP (ref 43–77)
NRBC # BLD: 0 /100 WBCS — SIGNIFICANT CHANGE UP (ref 0–0)
PLATELET # BLD AUTO: 202 K/UL — SIGNIFICANT CHANGE UP (ref 150–400)
RBC # BLD: 4.32 M/UL — SIGNIFICANT CHANGE UP (ref 3.8–5.2)
RBC # FLD: 13.1 % — SIGNIFICANT CHANGE UP (ref 10.3–14.5)
WBC # BLD: 5.67 K/UL — SIGNIFICANT CHANGE UP (ref 3.8–10.5)
WBC # FLD AUTO: 5.67 K/UL — SIGNIFICANT CHANGE UP (ref 3.8–10.5)

## 2023-06-05 PROCEDURE — 99214 OFFICE O/P EST MOD 30 MIN: CPT

## 2023-06-05 RX ORDER — ALENDRONATE SODIUM 70 MG/1
70 TABLET ORAL
Refills: 0 | Status: DISCONTINUED | COMMUNITY
End: 2023-06-05

## 2023-06-05 NOTE — PHYSICAL EXAM
[Fully active, able to carry on all pre-disease performance without restriction] : Status 0 - Fully active, able to carry on all pre-disease performance without restriction [Normal] : affect appropriate [de-identified] : no CVAT

## 2023-06-05 NOTE — CONSULT LETTER
[DrJenelle  ___] : Dr. SALAZAR [Dear  ___] : Dear  [unfilled], [Courtesy Letter:] : I had the pleasure of seeing your patient, [unfilled], in my office today. [Please see my note below.] : Please see my note below. [Sincerely,] : Sincerely, [FreeTextEntry2] : Melissa Barney MD [FreeTextEntry3] : Zachary Mckeon M.D., [facility]\par  [DrJenelle ___] : Dr. SALAZAR

## 2023-06-05 NOTE — ASSESSMENT
[Curative] : Goals of care discussed with patient: Curative [Palliative Care Plan] : not applicable at this time [FreeTextEntry1] : CSI FL, gr 1-2, had involved field radiotherapy completed 12/1/2021.\par She has no lymphoma related complaints and is doing well post radiation.  \par  \par She has RML findings on chest CT and PET/CT thought to be inflammatory in nature.  She has no significant pulmonary complaints.  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

## 2023-06-05 NOTE — HISTORY OF PRESENT ILLNESS
[Disease:__________________________] : Disease: [unfilled] [de-identified] : Ms. Lowenberg is a 76-year-old woman with newly diagnosed CS I DLBCL.   In 7/2021, she felt a firm, non-tender  lymph node in the right inguinal area.  U/S showed a 1.7 cm lymph node not further characterized.  CT of the A/P been showed the node to measure 0.9X 1.4 cm, with possible enhancement which was thought to be "somewhat unusual.".  There was no other LAD and there was no organomegaly.  There was a small left renal calculus, small umbilical hernia.  Increased eticular markings were seen at the lung bases, with tiny subpleural nodules at the right lung base thought nonspecific.  CT of chest with contrast showed nonspecific lung nodules, patchy consolidation in the RML with tree-in-bud opacities thought to be inflammatory or infectious.  Scattered foci of mucous plugging were seen, as well.  Mild changes consistent with emphysema were seen.\par Excisional biopsy showed follicular lymphoma, grade 1-2.  Ki-67 was 5-10%.  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  She has a significant history of DJD and has undergone bilateral shoulder arthroplasties along with a right hip arthroplasty which required a subsequent revision. She saw neurology after 3/1/21 right shoulder surgery b/o right arm pain and was diagnosed as having a complex regional pain syndrome.\par She has a h/o migraine headaces esponsive to medication.\par \par \par \par  [de-identified] : I pending bone marrow [de-identified] : Stage I follicular lymphoma.\par Work-up showed only subcutaneous infiltration at site of right inguinal lymph node biopsy site.\par Bone marrow was negative with some increase in TCR gamma/delta T cells.  Cytogenetics was normal.  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  LDH and beta-2 microglobulin were normal.  She was treated by Dr. Perea with 2400 cGy to the involved field in 12 fractions from 11/15/2021 to 12/1/2021.\par Treatment was well-tolerated except for minimal left lower quadrant abdominal discomfort which has resolved.  She developed fatigue which is now improving.\par She continues to have no constitutional complaints.\par occas LQ abd discomfort\par She has been diagnosed with a complex regional pain syndrome affecting her right arm\par hypogamma - no recent resp infection\par COVID x2 vaccine and with x 2 booster\par abd pain gas pain better with increased fiber \par \par

## 2023-06-05 NOTE — REVIEW OF SYSTEMS
[Abdominal Pain] : abdominal pain [Negative] : Allergic/Immunologic [Recent Change In Weight] : ~T no recent weight change [Shortness Of Breath] : no shortness of breath [Cough] : no cough [FreeTextEntry6] : pulm nodules being followed  [FreeTextEntry7] :  occasional [FreeTextEntry9] : arthritis shoulder x2  had right THR, cont to c/o left hip pain, seeing ortho

## 2023-06-07 ENCOUNTER — RESULT REVIEW (OUTPATIENT)
Age: 78
End: 2023-06-07

## 2023-06-14 ENCOUNTER — APPOINTMENT (OUTPATIENT)
Dept: CT IMAGING | Facility: CLINIC | Age: 78
End: 2023-06-14
Payer: MEDICARE

## 2023-06-14 ENCOUNTER — OUTPATIENT (OUTPATIENT)
Dept: OUTPATIENT SERVICES | Facility: HOSPITAL | Age: 78
LOS: 1 days | End: 2023-06-14
Payer: MEDICARE

## 2023-06-14 DIAGNOSIS — Z90.722 ACQUIRED ABSENCE OF OVARIES, BILATERAL: Chronic | ICD-10-CM

## 2023-06-14 DIAGNOSIS — Z96.649 PRESENCE OF UNSPECIFIED ARTIFICIAL HIP JOINT: Chronic | ICD-10-CM

## 2023-06-14 DIAGNOSIS — Z96.612 PRESENCE OF LEFT ARTIFICIAL SHOULDER JOINT: Chronic | ICD-10-CM

## 2023-06-14 DIAGNOSIS — C82.95: ICD-10-CM

## 2023-06-14 DIAGNOSIS — Z96.641 PRESENCE OF RIGHT ARTIFICIAL HIP JOINT: Chronic | ICD-10-CM

## 2023-06-14 PROCEDURE — 71260 CT THORAX DX C+: CPT | Mod: 26,MH

## 2023-06-14 PROCEDURE — 74177 CT ABD & PELVIS W/CONTRAST: CPT

## 2023-06-14 PROCEDURE — 74177 CT ABD & PELVIS W/CONTRAST: CPT | Mod: 26,MH

## 2023-06-14 PROCEDURE — 71260 CT THORAX DX C+: CPT

## 2023-08-28 ENCOUNTER — NON-APPOINTMENT (OUTPATIENT)
Age: 78
End: 2023-08-28

## 2023-09-08 DIAGNOSIS — R92.2 INCONCLUSIVE MAMMOGRAM: ICD-10-CM

## 2023-09-08 DIAGNOSIS — Z12.39 ENCOUNTER FOR OTHER SCREENING FOR MALIGNANT NEOPLASM OF BREAST: ICD-10-CM

## 2023-09-15 ASSESSMENT — HOOS JR
LYING IN BED (TURNING OVER, MAINTAINING HIP POSITION): SEVERE
IMPORTED HOOS JR SCORE: 46.65
IMPORTED HOOS JR SCORE: 100.0
SITTING: MODERATE
HOOS JR RAW SCORE: 0
RISING FROM SITTING: MILD
BENDING TO THE FLOOR TO PICK UP OBJECT: MODERATE
GOING UP OR DOWN STAIRS: SEVERE
GOING UP OR DOWN STAIRS: MILD
LYING IN BED (TURNING OVER, MAINTAINING HIP POSITION): MILD
IMPORTED HOOS JR SCORE: 80.55
RISING FROM SITTING: SEVERE
HOOS JR RAW SCORE: 1
HOOS JR RAW SCORE: 14
HOOS JR RAW SCORE: 3
IMPORTED HOOS JR SCORE: 92.34
IMPORTED FORM: YES
WALKING ON UNEVEN SURFACE: MILD

## 2023-11-03 ENCOUNTER — APPOINTMENT (OUTPATIENT)
Dept: ORTHOPEDIC SURGERY | Facility: CLINIC | Age: 78
End: 2023-11-03
Payer: MEDICARE

## 2023-11-03 VITALS — HEIGHT: 66 IN | BODY MASS INDEX: 21.21 KG/M2 | WEIGHT: 132 LBS

## 2023-11-03 DIAGNOSIS — Z96.612 PRESENCE OF LEFT ARTIFICIAL SHOULDER JOINT: ICD-10-CM

## 2023-11-03 DIAGNOSIS — Z96.611 PRESENCE OF RIGHT ARTIFICIAL SHOULDER JOINT: ICD-10-CM

## 2023-11-03 PROCEDURE — 73010 X-RAY EXAM OF SHOULDER BLADE: CPT | Mod: RT

## 2023-11-03 PROCEDURE — 99213 OFFICE O/P EST LOW 20 MIN: CPT

## 2023-11-03 PROCEDURE — 73030 X-RAY EXAM OF SHOULDER: CPT | Mod: RT

## 2023-11-20 ENCOUNTER — OUTPATIENT (OUTPATIENT)
Dept: OUTPATIENT SERVICES | Facility: HOSPITAL | Age: 78
LOS: 1 days | Discharge: HOME | End: 2023-11-20

## 2023-11-20 DIAGNOSIS — Z96.649 PRESENCE OF UNSPECIFIED ARTIFICIAL HIP JOINT: Chronic | ICD-10-CM

## 2023-11-20 DIAGNOSIS — C85.90 NON-HODGKIN LYMPHOMA, UNSPECIFIED, UNSPECIFIED SITE: ICD-10-CM

## 2023-11-20 DIAGNOSIS — Z90.722 ACQUIRED ABSENCE OF OVARIES, BILATERAL: Chronic | ICD-10-CM

## 2023-11-20 DIAGNOSIS — Z96.612 PRESENCE OF LEFT ARTIFICIAL SHOULDER JOINT: Chronic | ICD-10-CM

## 2023-11-20 DIAGNOSIS — Z96.641 PRESENCE OF RIGHT ARTIFICIAL HIP JOINT: Chronic | ICD-10-CM

## 2023-12-03 LAB
ALBUMIN MFR SERPL ELPH: 69.2 %
ALBUMIN SERPL ELPH-MCNC: 4 G/DL
ALBUMIN SERPL ELPH-MCNC: 4.2 G/DL
ALBUMIN SERPL ELPH-MCNC: 4.2 G/DL
ALBUMIN SERPL ELPH-MCNC: 4.3 G/DL
ALBUMIN SERPL ELPH-MCNC: 4.3 G/DL
ALBUMIN SERPL-MCNC: 4.2 G/DL
ALBUMIN/GLOB SERPL: 2.2 RATIO
ALP BLD-CCNC: 107 U/L
ALP BLD-CCNC: 55 U/L
ALP BLD-CCNC: 60 U/L
ALP BLD-CCNC: 64 U/L
ALP BLD-CCNC: 65 U/L
ALPHA1 GLOB MFR SERPL ELPH: 3.9 %
ALPHA1 GLOB SERPL ELPH-MCNC: 0.2 G/DL
ALPHA2 GLOB MFR SERPL ELPH: 9.4 %
ALPHA2 GLOB SERPL ELPH-MCNC: 0.6 G/DL
ALT SERPL-CCNC: 20 U/L
ALT SERPL-CCNC: 23 U/L
ALT SERPL-CCNC: 23 U/L
ALT SERPL-CCNC: 24 U/L
ALT SERPL-CCNC: 25 U/L
ANION GAP SERPL CALC-SCNC: 10 MMOL/L
ANION GAP SERPL CALC-SCNC: 10 MMOL/L
ANION GAP SERPL CALC-SCNC: 12 MMOL/L
ANION GAP SERPL CALC-SCNC: 12 MMOL/L
ANION GAP SERPL CALC-SCNC: 9 MMOL/L
AST SERPL-CCNC: 20 U/L
AST SERPL-CCNC: 20 U/L
AST SERPL-CCNC: 21 U/L
AST SERPL-CCNC: 23 U/L
AST SERPL-CCNC: 23 U/L
B-GLOBULIN MFR SERPL ELPH: 9 %
B-GLOBULIN SERPL ELPH-MCNC: 0.5 G/DL
B2 MICROGLOB SERPL-MCNC: 1.8 MG/L
BILIRUB SERPL-MCNC: 0.3 MG/DL
BILIRUB SERPL-MCNC: 0.4 MG/DL
BILIRUB SERPL-MCNC: 0.5 MG/DL
BUN SERPL-MCNC: 15 MG/DL
BUN SERPL-MCNC: 17 MG/DL
BUN SERPL-MCNC: 18 MG/DL
BUN SERPL-MCNC: 18 MG/DL
BUN SERPL-MCNC: 21 MG/DL
CALCIUM SERPL-MCNC: 9.4 MG/DL
CALCIUM SERPL-MCNC: 9.6 MG/DL
CALCIUM SERPL-MCNC: 9.8 MG/DL
CHLORIDE SERPL-SCNC: 104 MMOL/L
CHLORIDE SERPL-SCNC: 105 MMOL/L
CHLORIDE SERPL-SCNC: 106 MMOL/L
CO2 SERPL-SCNC: 25 MMOL/L
CO2 SERPL-SCNC: 27 MMOL/L
CO2 SERPL-SCNC: 28 MMOL/L
CREAT SERPL-MCNC: 0.71 MG/DL
CREAT SERPL-MCNC: 0.72 MG/DL
CREAT SERPL-MCNC: 0.75 MG/DL
CREAT SERPL-MCNC: 0.79 MG/DL
CREAT SERPL-MCNC: 0.81 MG/DL
DEPRECATED KAPPA LC FREE/LAMBDA SER: 1.17 RATIO
DEPRECATED KAPPA LC FREE/LAMBDA SER: 1.23 RATIO
DEPRECATED KAPPA LC FREE/LAMBDA SER: 1.25 RATIO
DEPRECATED KAPPA LC FREE/LAMBDA SER: 1.48 RATIO
DEPRECATED KAPPA LC FREE/LAMBDA SER: 1.51 RATIO
EGFR: 74 ML/MIN/1.73M2
EGFR: 77 ML/MIN/1.73M2
EGFR: 86 ML/MIN/1.73M2
EGFR: 88 ML/MIN/1.73M2
GAMMA GLOB FLD ELPH-MCNC: 0.5 G/DL
GAMMA GLOB MFR SERPL ELPH: 8.5 %
GLUCOSE SERPL-MCNC: 118 MG/DL
GLUCOSE SERPL-MCNC: 57 MG/DL
GLUCOSE SERPL-MCNC: 62 MG/DL
GLUCOSE SERPL-MCNC: 69 MG/DL
GLUCOSE SERPL-MCNC: 93 MG/DL
HBV CORE IGG+IGM SER QL: NONREACTIVE
HBV SURFACE AB SER QL: NONREACTIVE
HBV SURFACE AG SER QL: NONREACTIVE
HCV AB SER QL: NONREACTIVE
HCV S/CO RATIO: 0.18 S/CO
HIV1+2 AB SPEC QL IA.RAPID: NONREACTIVE
IGA SER QL IEP: 49 MG/DL
IGA SER QL IEP: 49 MG/DL
IGA SER QL IEP: 50 MG/DL
IGA SER QL IEP: 55 MG/DL
IGA SER QL IEP: 69 MG/DL
IGG SER QL IEP: 372 MG/DL
IGG SER QL IEP: 456 MG/DL
IGG SER QL IEP: 460 MG/DL
IGG SER QL IEP: 478 MG/DL
IGG SER QL IEP: 499 MG/DL
IGM SER QL IEP: 159 MG/DL
IGM SER QL IEP: 160 MG/DL
IGM SER QL IEP: 163 MG/DL
IGM SER QL IEP: 168 MG/DL
IGM SER QL IEP: 183 MG/DL
INTERPRETATION SERPL IEP-IMP: NORMAL
KAPPA LC CSF-MCNC: 1.04 MG/DL
KAPPA LC CSF-MCNC: 1.11 MG/DL
KAPPA LC CSF-MCNC: 1.13 MG/DL
KAPPA LC CSF-MCNC: 1.15 MG/DL
KAPPA LC CSF-MCNC: 1.24 MG/DL
KAPPA LC SERPL-MCNC: 1.22 MG/DL
KAPPA LC SERPL-MCNC: 1.36 MG/DL
KAPPA LC SERPL-MCNC: 1.41 MG/DL
KAPPA LC SERPL-MCNC: 1.74 MG/DL
KAPPA LC SERPL-MCNC: 1.83 MG/DL
LDH SERPL-CCNC: 196 U/L
LDH SERPL-CCNC: 199 U/L
LDH SERPL-CCNC: 203 U/L
LDH SERPL-CCNC: 220 U/L
LDH SERPL-CCNC: 225 U/L
M PROTEIN SPEC IFE-MCNC: NORMAL
POTASSIUM SERPL-SCNC: 4.2 MMOL/L
POTASSIUM SERPL-SCNC: 4.3 MMOL/L
POTASSIUM SERPL-SCNC: 4.4 MMOL/L
POTASSIUM SERPL-SCNC: 4.4 MMOL/L
POTASSIUM SERPL-SCNC: 4.5 MMOL/L
PROT SERPL-MCNC: 5.7 G/DL
PROT SERPL-MCNC: 5.8 G/DL
PROT SERPL-MCNC: 5.9 G/DL
PROT SERPL-MCNC: 6.1 G/DL
PROT SERPL-MCNC: 6.1 G/DL
SODIUM SERPL-SCNC: 141 MMOL/L
SODIUM SERPL-SCNC: 142 MMOL/L
SODIUM SERPL-SCNC: 144 MMOL/L

## 2023-12-04 ENCOUNTER — RESULT REVIEW (OUTPATIENT)
Age: 78
End: 2023-12-04

## 2023-12-04 ENCOUNTER — APPOINTMENT (OUTPATIENT)
Dept: HEMATOLOGY ONCOLOGY | Facility: CLINIC | Age: 78
End: 2023-12-04
Payer: MEDICARE

## 2023-12-04 VITALS
BODY MASS INDEX: 21.56 KG/M2 | DIASTOLIC BLOOD PRESSURE: 75 MMHG | RESPIRATION RATE: 14 BRPM | TEMPERATURE: 98.4 F | HEART RATE: 65 BPM | SYSTOLIC BLOOD PRESSURE: 128 MMHG | OXYGEN SATURATION: 95 % | WEIGHT: 133.6 LBS

## 2023-12-04 DIAGNOSIS — R91.8 OTHER NONSPECIFIC ABNORMAL FINDING OF LUNG FIELD: ICD-10-CM

## 2023-12-04 DIAGNOSIS — D80.1 NONFAMILIAL HYPOGAMMAGLOBULINEMIA: ICD-10-CM

## 2023-12-04 DIAGNOSIS — G56.41 CAUSALGIA OF RIGHT UPPER LIMB: ICD-10-CM

## 2023-12-04 LAB
ALBUMIN SERPL ELPH-MCNC: 4.2 G/DL
ALP BLD-CCNC: 64 U/L
ALT SERPL-CCNC: 22 U/L
ANION GAP SERPL CALC-SCNC: 9 MMOL/L
AST SERPL-CCNC: 22 U/L
BASOPHILS # BLD AUTO: 0.04 K/UL — SIGNIFICANT CHANGE UP (ref 0–0.2)
BASOPHILS # BLD AUTO: 0.04 K/UL — SIGNIFICANT CHANGE UP (ref 0–0.2)
BASOPHILS NFR BLD AUTO: 0.8 % — SIGNIFICANT CHANGE UP (ref 0–2)
BASOPHILS NFR BLD AUTO: 0.8 % — SIGNIFICANT CHANGE UP (ref 0–2)
BILIRUB SERPL-MCNC: 0.4 MG/DL
BUN SERPL-MCNC: 20 MG/DL
CALCIUM SERPL-MCNC: 9.6 MG/DL
CHLORIDE SERPL-SCNC: 105 MMOL/L
CO2 SERPL-SCNC: 28 MMOL/L
CREAT SERPL-MCNC: 0.77 MG/DL
EGFR: 79 ML/MIN/1.73M2
EOSINOPHIL # BLD AUTO: 0.22 K/UL — SIGNIFICANT CHANGE UP (ref 0–0.5)
EOSINOPHIL # BLD AUTO: 0.22 K/UL — SIGNIFICANT CHANGE UP (ref 0–0.5)
EOSINOPHIL NFR BLD AUTO: 4.6 % — SIGNIFICANT CHANGE UP (ref 0–6)
EOSINOPHIL NFR BLD AUTO: 4.6 % — SIGNIFICANT CHANGE UP (ref 0–6)
GLUCOSE SERPL-MCNC: 72 MG/DL
HCT VFR BLD CALC: 37.8 % — SIGNIFICANT CHANGE UP (ref 34.5–45)
HCT VFR BLD CALC: 37.8 % — SIGNIFICANT CHANGE UP (ref 34.5–45)
HGB BLD-MCNC: 12.9 G/DL — SIGNIFICANT CHANGE UP (ref 11.5–15.5)
HGB BLD-MCNC: 12.9 G/DL — SIGNIFICANT CHANGE UP (ref 11.5–15.5)
IMM GRANULOCYTES NFR BLD AUTO: 0.2 % — SIGNIFICANT CHANGE UP (ref 0–0.9)
IMM GRANULOCYTES NFR BLD AUTO: 0.2 % — SIGNIFICANT CHANGE UP (ref 0–0.9)
LDH SERPL-CCNC: 207 U/L
LYMPHOCYTES # BLD AUTO: 1.05 K/UL — SIGNIFICANT CHANGE UP (ref 1–3.3)
LYMPHOCYTES # BLD AUTO: 1.05 K/UL — SIGNIFICANT CHANGE UP (ref 1–3.3)
LYMPHOCYTES # BLD AUTO: 22 % — SIGNIFICANT CHANGE UP (ref 13–44)
LYMPHOCYTES # BLD AUTO: 22 % — SIGNIFICANT CHANGE UP (ref 13–44)
MCHC RBC-ENTMCNC: 31.2 PG — SIGNIFICANT CHANGE UP (ref 27–34)
MCHC RBC-ENTMCNC: 31.2 PG — SIGNIFICANT CHANGE UP (ref 27–34)
MCHC RBC-ENTMCNC: 34.1 G/DL — SIGNIFICANT CHANGE UP (ref 32–36)
MCHC RBC-ENTMCNC: 34.1 G/DL — SIGNIFICANT CHANGE UP (ref 32–36)
MCV RBC AUTO: 91.3 FL — SIGNIFICANT CHANGE UP (ref 80–100)
MCV RBC AUTO: 91.3 FL — SIGNIFICANT CHANGE UP (ref 80–100)
MONOCYTES # BLD AUTO: 0.5 K/UL — SIGNIFICANT CHANGE UP (ref 0–0.9)
MONOCYTES # BLD AUTO: 0.5 K/UL — SIGNIFICANT CHANGE UP (ref 0–0.9)
MONOCYTES NFR BLD AUTO: 10.5 % — SIGNIFICANT CHANGE UP (ref 2–14)
MONOCYTES NFR BLD AUTO: 10.5 % — SIGNIFICANT CHANGE UP (ref 2–14)
NEUTROPHILS # BLD AUTO: 2.96 K/UL — SIGNIFICANT CHANGE UP (ref 1.8–7.4)
NEUTROPHILS # BLD AUTO: 2.96 K/UL — SIGNIFICANT CHANGE UP (ref 1.8–7.4)
NEUTROPHILS NFR BLD AUTO: 61.9 % — SIGNIFICANT CHANGE UP (ref 43–77)
NEUTROPHILS NFR BLD AUTO: 61.9 % — SIGNIFICANT CHANGE UP (ref 43–77)
NRBC # BLD: 0 /100 WBCS — SIGNIFICANT CHANGE UP (ref 0–0)
NRBC # BLD: 0 /100 WBCS — SIGNIFICANT CHANGE UP (ref 0–0)
PLATELET # BLD AUTO: 152 K/UL — SIGNIFICANT CHANGE UP (ref 150–400)
PLATELET # BLD AUTO: 152 K/UL — SIGNIFICANT CHANGE UP (ref 150–400)
POTASSIUM SERPL-SCNC: 4.5 MMOL/L
PROT SERPL-MCNC: 5.8 G/DL
RBC # BLD: 4.14 M/UL — SIGNIFICANT CHANGE UP (ref 3.8–5.2)
RBC # BLD: 4.14 M/UL — SIGNIFICANT CHANGE UP (ref 3.8–5.2)
RBC # FLD: 13.1 % — SIGNIFICANT CHANGE UP (ref 10.3–14.5)
RBC # FLD: 13.1 % — SIGNIFICANT CHANGE UP (ref 10.3–14.5)
SODIUM SERPL-SCNC: 142 MMOL/L
WBC # BLD: 4.78 K/UL — SIGNIFICANT CHANGE UP (ref 3.8–10.5)
WBC # BLD: 4.78 K/UL — SIGNIFICANT CHANGE UP (ref 3.8–10.5)
WBC # FLD AUTO: 4.78 K/UL — SIGNIFICANT CHANGE UP (ref 3.8–10.5)
WBC # FLD AUTO: 4.78 K/UL — SIGNIFICANT CHANGE UP (ref 3.8–10.5)

## 2023-12-04 PROCEDURE — 99213 OFFICE O/P EST LOW 20 MIN: CPT

## 2023-12-05 PROBLEM — G56.41 COMPLEX REGIONAL PAIN SYNDROME TYPE 2 OF RIGHT UPPER EXTREMITY: Status: ACTIVE | Noted: 2021-03-17

## 2023-12-05 PROBLEM — R91.8 LUNG NODULES: Status: ACTIVE | Noted: 2021-10-08

## 2023-12-05 PROBLEM — D80.1 ACQUIRED HYPOGAMMAGLOBULINEMIA: Status: ACTIVE | Noted: 2022-03-02

## 2023-12-05 LAB
DEPRECATED KAPPA LC FREE/LAMBDA SER: 1.66 RATIO
IGA SER QL IEP: 59 MG/DL
IGG SER QL IEP: 479 MG/DL
IGM SER QL IEP: 179 MG/DL
KAPPA LC CSF-MCNC: 1.15 MG/DL
KAPPA LC SERPL-MCNC: 1.91 MG/DL

## 2024-02-29 NOTE — ASU PREOP CHECKLIST - HIBICLENS SHOWER 1 DATE
14-Sep-2020 Qbrexza Counseling:  I discussed with the patient the risks of Qbrexza including but not limited to headache, mydriasis, blurred vision, dry eyes, nasal dryness, dry mouth, dry throat, dry skin, urinary hesitation, and constipation.  Local skin reactions including erythema, burning, stinging, and itching can also occur.

## 2024-03-24 LAB
CYTOLOGY CVX/VAG DOC THIN PREP: ABNORMAL
HPV HIGH+LOW RISK DNA PNL CVX: NOT DETECTED

## 2024-04-03 ENCOUNTER — APPOINTMENT (OUTPATIENT)
Dept: ORTHOPEDIC SURGERY | Facility: CLINIC | Age: 79
End: 2024-04-03
Payer: MEDICARE

## 2024-04-03 DIAGNOSIS — M18.12 UNILATERAL PRIMARY OSTEOARTHRITIS OF FIRST CARPOMETACARPAL JOINT, LEFT HAND: ICD-10-CM

## 2024-04-03 PROCEDURE — 99214 OFFICE O/P EST MOD 30 MIN: CPT | Mod: 25

## 2024-04-03 PROCEDURE — 73140 X-RAY EXAM OF FINGER(S): CPT | Mod: LT

## 2024-04-03 PROCEDURE — 20600 DRAIN/INJ JOINT/BURSA W/O US: CPT | Mod: LT

## 2024-04-03 NOTE — IMAGING
[de-identified] : LEFT HAND + thumb CMCJ shoulder sign. skin intact. no swelling. mild TTP to thumb CMCJ. wrist ROM: good extension, flexion. good pronation, supination. good EPL, FPL. good finger extension, flex to full fist. good finger abduction and adduction. SILT to median, ulnar, radial distribution. palpable radial pulse, brisk cap refill all digits. no triggering.   XRAYS OF LEFT THUMB: no acute displaced fracture or dislocation. severe djd of thumb CMCJ.

## 2024-04-03 NOTE — ASSESSMENT
[FreeTextEntry1] : The condition was explained to the patient. Discussed that arthritis is progressive, but has a remitting and relapsing course. Discussed treatment ladder - observation, oral NSAIDs, bracing, steroid injection, and ultimately surgery if necessary. - recommend thumb spica splint vs comfort cool thumb CMCJ splint PRN. - activity modification, OTC adaptive tools/, moist heat PRN. - recommend OTC pain medications such as Tylenol and NSAIDs as needed. reviewed contra-indicated medical conditions (eg liver disease, kidney disease, or GI ulcer/bleeding) or medications (eg blood thinners). Discussed possible GI and blood pressure side effects.   Patient would like to proceed with CSI for thumb CMCJ. - Discussed risks, benefits, and alternatives as well as contents of injection. Risks include, but are not limited allergic reaction, flare reaction, injection site pain, bruising, numbness, increased blood sugar, skin discoloration, fat atrophy, tendon rupture, and infection. Risk of immune suppression and increased susceptibility to infection with steroid use. Patient expressed understanding and would like to proceed with injection. - The skin over the LEFT thumb CMCJ was cleansed with alcohol/betadine and anesthetized with ethyl chloride and 1cc of 1% lidocaine. The joint was injected with 6mg of celestone. Site was dressed with gauze and an ACE wrap. Patient tolerated the procedure well.  F/u PRN.

## 2024-04-03 NOTE — HISTORY OF PRESENT ILLNESS
[6] : 6 [Dull/Aching] : dull/aching [Radiating] : radiating [Constant] : constant [Nothing helps with pain getting better] : Nothing helps with pain getting better [de-identified] : 4/3/24: 79yo female (RHDJenelle Li) presents for LEFT thumb pain for years. Reports h/o CSI, which helped. Reports pain had been controlled with PO Diclofenac, which she is taking for other joint pains, but she has been weaning off the medication, and has noticed the pain more.  Hx: Follicular lymphoma s/p radiation therapy. Osteoporosis. Sx: bilateral RSA (Dr. Navas R 2021, L 2020) c/b RUE brachial plexopathy and CRPS. revision R KALPANA (Dr. Zachary Robles 2019).  [] : no [FreeTextEntry5] : L. Thumb pain that has been present for years but has gotten worse recently. Has gotten CSI in the past that helped.  [FreeTextEntry7] : left wrist

## 2024-04-06 NOTE — DISCHARGE NOTE NURSING/CASE MANAGEMENT/SOCIAL WORK - NSDCPEFALRISK_GEN_ALL_CORE
Follow-up with [facility] for chronic knee pain or  PCP   Patient information on fall and injury prevention

## 2024-06-10 ENCOUNTER — RESULT REVIEW (OUTPATIENT)
Age: 79
End: 2024-06-10

## 2024-06-10 ENCOUNTER — APPOINTMENT (OUTPATIENT)
Dept: HEMATOLOGY ONCOLOGY | Facility: CLINIC | Age: 79
End: 2024-06-10
Payer: MEDICARE

## 2024-06-10 VITALS
SYSTOLIC BLOOD PRESSURE: 123 MMHG | BODY MASS INDEX: 21.14 KG/M2 | WEIGHT: 130.95 LBS | HEART RATE: 51 BPM | OXYGEN SATURATION: 95 % | TEMPERATURE: 97.3 F | DIASTOLIC BLOOD PRESSURE: 73 MMHG | RESPIRATION RATE: 15 BRPM

## 2024-06-10 DIAGNOSIS — Z92.3 PERSONAL HISTORY OF IRRADIATION: ICD-10-CM

## 2024-06-10 DIAGNOSIS — M25.50 PAIN IN UNSPECIFIED JOINT: ICD-10-CM

## 2024-06-10 DIAGNOSIS — C82.95: ICD-10-CM

## 2024-06-10 DIAGNOSIS — D69.6 THROMBOCYTOPENIA, UNSPECIFIED: ICD-10-CM

## 2024-06-10 LAB
ALBUMIN SERPL ELPH-MCNC: 4.3 G/DL
ALP BLD-CCNC: 64 U/L
ALT SERPL-CCNC: 22 U/L
ANION GAP SERPL CALC-SCNC: 10 MMOL/L
AST SERPL-CCNC: 22 U/L
BILIRUB SERPL-MCNC: 0.5 MG/DL
BUN SERPL-MCNC: 17 MG/DL
CALCIUM SERPL-MCNC: 9.6 MG/DL
CHLORIDE SERPL-SCNC: 105 MMOL/L
CO2 SERPL-SCNC: 27 MMOL/L
CREAT SERPL-MCNC: 0.8 MG/DL
DEPRECATED KAPPA LC FREE/LAMBDA SER: 1.35 RATIO
EGFR: 75 ML/MIN/1.73M2
GLUCOSE SERPL-MCNC: 94 MG/DL
IGA SER QL IEP: 62 MG/DL
IGG SER QL IEP: 441 MG/DL
IGM SER QL IEP: 175 MG/DL
KAPPA LC CSF-MCNC: 1.1 MG/DL
KAPPA LC SERPL-MCNC: 1.48 MG/DL
LDH SERPL-CCNC: 203 U/L
POTASSIUM SERPL-SCNC: 5 MMOL/L
PROT SERPL-MCNC: 5.9 G/DL
SODIUM SERPL-SCNC: 142 MMOL/L

## 2024-06-10 PROCEDURE — 99214 OFFICE O/P EST MOD 30 MIN: CPT

## 2024-06-10 PROCEDURE — G2211 COMPLEX E/M VISIT ADD ON: CPT

## 2024-06-10 NOTE — END OF VISIT
[] : Fellow [FreeTextEntry3] : Remains TYSON. Hypogamma w/o excess resp infections to date. Defer imaging for now.

## 2024-06-10 NOTE — PHYSICAL EXAM
[Fully active, able to carry on all pre-disease performance without restriction] : Status 0 - Fully active, able to carry on all pre-disease performance without restriction [Normal] : affect appropriate [de-identified] : no CVAT

## 2024-06-10 NOTE — HISTORY OF PRESENT ILLNESS
[Disease:__________________________] : Disease: [unfilled] [de-identified] : Ms. Lowenberg is a 76-year-old woman with newly diagnosed CS I DLBCL.   In 7/2021, she felt a firm, non-tender  lymph node in the right inguinal area.  U/S showed a 1.7 cm lymph node not further characterized.  CT of the A/P been showed the node to measure 0.9X 1.4 cm, with possible enhancement which was thought to be "somewhat unusual.".  There was no other LAD and there was no organomegaly.  There was a small left renal calculus, small umbilical hernia.  Increased eticular markings were seen at the lung bases, with tiny subpleural nodules at the right lung base thought nonspecific.  CT of chest with contrast showed nonspecific lung nodules, patchy consolidation in the RML with tree-in-bud opacities thought to be inflammatory or infectious.  Scattered foci of mucous plugging were seen, as well.  Mild changes consistent with emphysema were seen.\par  Excisional biopsy showed follicular lymphoma, grade 1-2.  Ki-67 was 5-10%.  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  She has a significant history of DJD and has undergone bilateral shoulder arthroplasties along with a right hip arthroplasty which required a subsequent revision. She saw neurology after 3/1/21 right shoulder surgery b/o right arm pain and was diagnosed as having a complex regional pain syndrome.\par  She has a h/o migraine headaces esponsive to medication.\par  \par  \par  \par   [de-identified] : I pending bone marrow [de-identified] : Stage I follicular lymphoma. Work-up showed only subcutaneous infiltration at site of right inguinal lymph node biopsy site. Bone marrow was negative with some increase in TCR gamma/delta T cells.  Cytogenetics was normal.  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  LDH and beta-2 microglobulin were normal.  She was treated by Dr. Perea with 2400 cGy to the involved field in 12 fractions from 11/15/2021 to 12/1/2021. Treatment was well-tolerated.   CT C/A/P 6/14/2023: no evidence of recurrence She continues to have no constitutional complaints. She still has occasional LQ abd discomfort  - non progressive, has had GI assessment She has been diagnosed with a complex regional pain syndrome affecting her right arm with numbness.  hypogamma - no recent resp infection

## 2024-06-10 NOTE — ASSESSMENT
[Curative] : Goals of care discussed with patient: Curative [Palliative Care Plan] : not applicable at this time [With Patient/Caregiver] : With Patient/Caregiver [Designated Health Care Proxy] : Designated Health Care Proxy [Name: ___] : Name: [unfilled] [Limited] : limited [FreeTextEntry1] : CSI FL, gr 1-2, had involved field radiotherapy completed 12/1/2021.  She has no lymphoma related complaints and remains well post radiation.  She has no significant pulmonary complaints. She has tiny pulmonary nodules that were not FDG avid. CT chest 8/2022 showed new areas of mucoid impaction, unchanged 3 mm RML nodules, unchanged patchy RML consolidation with adjacent tree-in-bud infiltrate. repeat CT C/A/P: no evidence of dz recurrence  CBC results reviewed and d/w pt; WBC 4.28, Hgb 13.2,  Plt 145K, nl diff. thrombocytopenia -again noted - has been non-progressive hypogamma (signif decr in IgG) with low IgA and IgG no excess resp infections to date   She has seen pulm for the lung nodules and was told she didn't need close follow up as scans were unremarkable.   check CMP, LDH, beta-2 microglobulin, repeat Igs.  RV 6 mos        [AdvancecareDate] : 06/10/24

## 2024-06-10 NOTE — REVIEW OF SYSTEMS
[Abdominal Pain] : abdominal pain [Joint Pain] : joint pain [Negative] : Allergic/Immunologic [Recent Change In Weight] : ~T no recent weight change [Shortness Of Breath] : no shortness of breath [Cough] : no cough [Muscle Pain] : no muscle pain [Muscle Weakness] : no muscle weakness [FreeTextEntry6] : pulm nodules being followed  [FreeTextEntry7] : chronic constipation [FreeTextEntry9] : thumb pain as above [de-identified] : occas migraines, takes sumatriptan

## 2024-06-10 NOTE — CONSULT LETTER
[Dear  ___] : Dear  [unfilled], [Courtesy Letter:] : I had the pleasure of seeing your patient, [unfilled], in my office today. [Please see my note below.] : Please see my note below. [Sincerely,] : Sincerely, [DrJenelle  ___] : Dr. SALAZAR [DrJenelle ___] : Dr. SALAZAR [FreeTextEntry2] : Melissa Barney MD [FreeTextEntry3] : Zachary Mckeon M.D., [facility]\par

## 2024-06-18 ENCOUNTER — APPOINTMENT (OUTPATIENT)
Dept: ORTHOPEDIC SURGERY | Facility: CLINIC | Age: 79
End: 2024-06-18
Payer: MEDICARE

## 2024-06-18 VITALS — HEIGHT: 66 IN | WEIGHT: 132 LBS | BODY MASS INDEX: 21.21 KG/M2

## 2024-06-18 DIAGNOSIS — T84.84XA PAIN DUE TO INTERNAL ORTHOPEDIC PROSTHETIC DEVICES, IMPLANTS AND GRAFTS, INITIAL ENCOUNTER: ICD-10-CM

## 2024-06-18 DIAGNOSIS — Z96.649 PAIN DUE TO INTERNAL ORTHOPEDIC PROSTHETIC DEVICES, IMPLANTS AND GRAFTS, INITIAL ENCOUNTER: ICD-10-CM

## 2024-06-18 PROCEDURE — 99203 OFFICE O/P NEW LOW 30 MIN: CPT

## 2024-06-18 PROCEDURE — 73502 X-RAY EXAM HIP UNI 2-3 VIEWS: CPT

## 2024-06-18 NOTE — PHYSICAL EXAM
[Normal] : Gait: normal [de-identified] : General: No acute distress Mental: Alert and oriented x3 Eyes: Conjunctivitis not seen Chest: Symmetric chest rise, no audible wheezing Skin: Bilateral lower extremities absent from rashes and ulcers Abdomen: No distention  Right leg shorter by half centimeter Right hip: Skin: Clean, dry and intact, well-healed anterior incision Inspection: No obvious deformity, no swelling, no ecchymosis. Tenderness: no tenderness over greater trochanter/gluteus medius insertion. No tenderness pubic symphysis, pubic tubercle, hip flexors. No tenderness ischial tuberosity or buttock. Nontender over the ASIS/Illiac crest. ROM: 0-120. Internal rotation 40, external rotation 70 Special tests: Positive Stinchfield, positive FADIR, negative SABRINA, negative logroll Additional tests: No pain with circumduction Strength: 5/5 hip flexion/Abduction/Q/H/TA/GS/EHL Neuro: Sensation intact to light touch throughout in dp/sp/tib/george/saph distributions Pulses: 2+ DP/PT pulses [de-identified] : Pelvis and right hip x-rays today shows total hip arthroplasty components without fracture.  In comparison to prior x-rays there is loss of medial bone at the calcar and possible slight stem subsidence, slight halo around the distal stem.  On crosstable lateral view there is overhanging cup at the anterior aspect.

## 2024-06-18 NOTE — HISTORY OF PRESENT ILLNESS
[de-identified] : 79-year-old female with right hip and groin pain for 3 days.  She was doing yoga in the last 2 weeks and felt increasing pain following stretching.  She denies clicking or snapping.  She reports weakness in the hip.  There is no pain radiating distally.  She underwent right KALPANA on 4/12/2019 by Dr. Robles.  She underwent revision right KALPANA on 12/19/2019 involving head and liner exchange, removal of acetabular screws, and iliopsoas tenotomy.  She was doing well for several years until recently.  She takes Tylenol and diclofenac chronically due to multiple joint pains.

## 2024-06-18 NOTE — DISCUSSION/SUMMARY
[de-identified] : 79-year-old female with acute right groin pain.  This is likely secondary to hip strain.  X-rays show possible slight loosening of the stem and loss of medial bone.  She is not having start of pain.  I recommended stretching exercises, Tylenol and NSAIDs, supportive care and follow-up in 4 weeks.  Will consider CT scan or no MRI if symptoms worsen.

## 2024-07-09 ENCOUNTER — APPOINTMENT (OUTPATIENT)
Dept: ORTHOPEDIC SURGERY | Facility: CLINIC | Age: 79
End: 2024-07-09

## 2024-07-16 ENCOUNTER — APPOINTMENT (OUTPATIENT)
Dept: ORTHOPEDIC SURGERY | Facility: CLINIC | Age: 79
End: 2024-07-16

## 2024-07-16 NOTE — PATIENT PROFILE ADULT - BRADEN MOBILITY
"  Physical Therapy Treatment Note     Name: Andree Mcclelland  Clinic Number: [id number]    Therapy Diagnosis:   Encounter Diagnosis   Name Primary?    Generalized muscle weakness Yes     Physician: Vish Resendez DO    Visit Date: 2024    Physician Orders: PT Eval and Treat  Medical Diagnosis from Referral: Generalized muscle weakness  Evaluation Date: 2024  Authorization Period Expiration: 24  Plan of Care Expiration: 2024  Visit # / Visits authorized:      Precautions: Frequent Falls     Time In: 1055  Time Out: 1145  Total Billable Time: 50 minutes    Subjective     Patient reports: she continues to feel better. "Y'all have done so much for me since I have been coming here." Reports she is headed to a  after this session so is on a time frame today.      CMS Impairment/Limitation/Restriction for FOTO Survey  Therapist reviewed FOTO scores for Andree Mcclelland on 2024. FOTO documents entered into EPIC - see Media section.  FOTO filled out by: Patient  Patient's Physical FS Primary Measure: 36  Risk Adjusted Statistical FOTO: 47 (predicted 51 by visit #10)  Limitation Score: 64%  Category: Mobility    Therapist reviewed FOTO scores for Andree Mcclelland on 2024. FOTO documents entered into EPIC - see Media section.  FOTO filled out by: Patient  Patient's Physical FS Primary Measure: 33  Risk Adjusted Statistical FOTO: 47 (predicted 51 by visit #10)  Limitation Score: 67%  Category: Mobility    Therapist reviewed FOTO scores for Andree Mcclelland on 2024. FOTO documents entered into EPIC - see Media section.  FOTO filled out by: Patient  Patient's Physical FS Primary Measure: 43  Risk Adjusted Statistical FOTO: 47 (predicted 51 by visit #10)  Limitation Score: 57%  Category: Mobility    Objective     Andree received the following treatment:     Time Activities   TherEx 50 min NuStep, sit<>stand no UEs, step ups (fwd, lat), treadmill walking       Home Exercises Provided and Patient " Education Provided     Education provided:   -Plan of care, HEP (printed, given, and reviewed 7/2/24; therabands yellow and red given 7/2/24)    Assessment     She continues to perform and tolerate all activities. We will plan to increase some of exercises next visit for continued progression and challenge to patient.     Patient prognosis is Good.      Anticipated barriers to physical therapy: Co-morbidities    Goals: Andree is working towards her goals.  Short Term Goals: 6 weeks   Patient will report at least 10 point increase on initial FOTO score to indicate clinically significant functional improvement     Long Term Goals: 12 weeks   Patient will report at least 15 point increase on initial FOTO score to indicate clinically significant functional improvement  Patient will complete 10 laps during 6 minute walk test to show improved activity tolerance    Plan     2-3x/week x 12 weeks    Jose J Bianchi, PT       (4) no limitation

## 2024-07-29 NOTE — PHYSICAL THERAPY INITIAL EVALUATION ADULT - REFERRING PHYSICIAN, REHAB EVAL
Orders Placed This Encounter   Procedures    Wound cleansing and dressings     The sternal wound is healed. May wash with mild soap and water. May apply moisturizer. Stop using betadine. Thank you for coming to our center.     Standing Status:   Future     Standing Expiration Date:   7/29/2024      
LALY

## 2024-11-25 ENCOUNTER — OUTPATIENT (OUTPATIENT)
Dept: OUTPATIENT SERVICES | Facility: HOSPITAL | Age: 79
LOS: 1 days | Discharge: HOME | End: 2024-11-25
Payer: MEDICARE

## 2024-11-25 DIAGNOSIS — Z96.649 PRESENCE OF UNSPECIFIED ARTIFICIAL HIP JOINT: Chronic | ICD-10-CM

## 2024-11-25 DIAGNOSIS — Z96.641 PRESENCE OF RIGHT ARTIFICIAL HIP JOINT: Chronic | ICD-10-CM

## 2024-11-25 DIAGNOSIS — Z90.722 ACQUIRED ABSENCE OF OVARIES, BILATERAL: Chronic | ICD-10-CM

## 2024-11-25 DIAGNOSIS — Z96.612 PRESENCE OF LEFT ARTIFICIAL SHOULDER JOINT: Chronic | ICD-10-CM

## 2024-11-25 DIAGNOSIS — C85.90 NON-HODGKIN LYMPHOMA, UNSPECIFIED, UNSPECIFIED SITE: ICD-10-CM

## 2024-11-27 ENCOUNTER — NON-APPOINTMENT (OUTPATIENT)
Age: 79
End: 2024-11-27

## 2024-12-02 ENCOUNTER — RESULT REVIEW (OUTPATIENT)
Age: 79
End: 2024-12-02

## 2024-12-02 ENCOUNTER — APPOINTMENT (OUTPATIENT)
Dept: HEMATOLOGY ONCOLOGY | Facility: CLINIC | Age: 79
End: 2024-12-02
Payer: MEDICARE

## 2024-12-02 VITALS
RESPIRATION RATE: 16 BRPM | OXYGEN SATURATION: 99 % | SYSTOLIC BLOOD PRESSURE: 151 MMHG | DIASTOLIC BLOOD PRESSURE: 75 MMHG | HEART RATE: 60 BPM | WEIGHT: 132.5 LBS | BODY MASS INDEX: 21.39 KG/M2 | TEMPERATURE: 97.9 F

## 2024-12-02 VITALS — DIASTOLIC BLOOD PRESSURE: 82 MMHG | SYSTOLIC BLOOD PRESSURE: 162 MMHG

## 2024-12-02 VITALS — SYSTOLIC BLOOD PRESSURE: 146 MMHG | DIASTOLIC BLOOD PRESSURE: 85 MMHG

## 2024-12-02 DIAGNOSIS — Z96.641 PRESENCE OF RIGHT ARTIFICIAL HIP JOINT: ICD-10-CM

## 2024-12-02 DIAGNOSIS — Z96.612 PRESENCE OF LEFT ARTIFICIAL SHOULDER JOINT: ICD-10-CM

## 2024-12-02 DIAGNOSIS — C82.95: ICD-10-CM

## 2024-12-02 DIAGNOSIS — Z96.611 PRESENCE OF RIGHT ARTIFICIAL SHOULDER JOINT: ICD-10-CM

## 2024-12-02 DIAGNOSIS — Z92.3 PERSONAL HISTORY OF IRRADIATION: ICD-10-CM

## 2024-12-02 DIAGNOSIS — G43.909 MIGRAINE, UNSPECIFIED, NOT INTRACTABLE, W/OUT STATUS MIGRAINOSUS: ICD-10-CM

## 2024-12-02 DIAGNOSIS — D80.1 NONFAMILIAL HYPOGAMMAGLOBULINEMIA: ICD-10-CM

## 2024-12-02 DIAGNOSIS — D69.6 THROMBOCYTOPENIA, UNSPECIFIED: ICD-10-CM

## 2024-12-02 LAB
BASOPHILS # BLD AUTO: 0.05 K/UL — SIGNIFICANT CHANGE UP (ref 0–0.2)
BASOPHILS NFR BLD AUTO: 1.2 % — SIGNIFICANT CHANGE UP (ref 0–2)
EOSINOPHIL # BLD AUTO: 0.12 K/UL — SIGNIFICANT CHANGE UP (ref 0–0.5)
EOSINOPHIL NFR BLD AUTO: 2.8 % — SIGNIFICANT CHANGE UP (ref 0–6)
HCT VFR BLD CALC: 40.2 % — SIGNIFICANT CHANGE UP (ref 34.5–45)
HGB BLD-MCNC: 13.5 G/DL — SIGNIFICANT CHANGE UP (ref 11.5–15.5)
IMM GRANULOCYTES NFR BLD AUTO: 0 % — SIGNIFICANT CHANGE UP (ref 0–0.9)
LYMPHOCYTES # BLD AUTO: 1.2 K/UL — SIGNIFICANT CHANGE UP (ref 1–3.3)
LYMPHOCYTES # BLD AUTO: 28 % — SIGNIFICANT CHANGE UP (ref 13–44)
MCHC RBC-ENTMCNC: 31.3 PG — SIGNIFICANT CHANGE UP (ref 27–34)
MCHC RBC-ENTMCNC: 33.6 G/DL — SIGNIFICANT CHANGE UP (ref 32–36)
MCV RBC AUTO: 93.1 FL — SIGNIFICANT CHANGE UP (ref 80–100)
MONOCYTES # BLD AUTO: 0.52 K/UL — SIGNIFICANT CHANGE UP (ref 0–0.9)
MONOCYTES NFR BLD AUTO: 12.1 % — SIGNIFICANT CHANGE UP (ref 2–14)
NEUTROPHILS # BLD AUTO: 2.39 K/UL — SIGNIFICANT CHANGE UP (ref 1.8–7.4)
NEUTROPHILS NFR BLD AUTO: 55.9 % — SIGNIFICANT CHANGE UP (ref 43–77)
NRBC # BLD: 0 /100 WBCS — SIGNIFICANT CHANGE UP (ref 0–0)
NRBC BLD-RTO: 0 /100 WBCS — SIGNIFICANT CHANGE UP (ref 0–0)
PLATELET # BLD AUTO: 132 K/UL — LOW (ref 150–400)
RBC # BLD: 4.32 M/UL — SIGNIFICANT CHANGE UP (ref 3.8–5.2)
RBC # FLD: 12.8 % — SIGNIFICANT CHANGE UP (ref 10.3–14.5)
WBC # BLD: 4.28 K/UL — SIGNIFICANT CHANGE UP (ref 3.8–10.5)
WBC # FLD AUTO: 4.28 K/UL — SIGNIFICANT CHANGE UP (ref 3.8–10.5)

## 2024-12-02 PROCEDURE — G2211 COMPLEX E/M VISIT ADD ON: CPT

## 2024-12-02 PROCEDURE — 99213 OFFICE O/P EST LOW 20 MIN: CPT

## 2024-12-09 DIAGNOSIS — R92.1 MAMMOGRAPHIC CALCIFICATION FOUND ON DIAGNOSTIC IMAGING OF BREAST: ICD-10-CM

## 2024-12-13 ENCOUNTER — APPOINTMENT (OUTPATIENT)
Dept: PAIN MANAGEMENT | Facility: CLINIC | Age: 79
End: 2024-12-13
Payer: MEDICARE

## 2024-12-13 VITALS
SYSTOLIC BLOOD PRESSURE: 143 MMHG | HEIGHT: 66 IN | TEMPERATURE: 98.2 F | WEIGHT: 135 LBS | DIASTOLIC BLOOD PRESSURE: 81 MMHG | BODY MASS INDEX: 21.69 KG/M2 | HEART RATE: 66 BPM

## 2024-12-13 PROCEDURE — 99204 OFFICE O/P NEW MOD 45 MIN: CPT

## 2024-12-18 PROBLEM — R92.1 BREAST CALCIFICATIONS: Status: ACTIVE | Noted: 2024-12-18

## 2025-01-15 ENCOUNTER — APPOINTMENT (OUTPATIENT)
Dept: OBGYN | Facility: CLINIC | Age: 80
End: 2025-01-15
Payer: MEDICARE

## 2025-01-15 VITALS — DIASTOLIC BLOOD PRESSURE: 75 MMHG | SYSTOLIC BLOOD PRESSURE: 120 MMHG

## 2025-01-15 DIAGNOSIS — Z01.419 ENCOUNTER FOR GYNECOLOGICAL EXAMINATION (GENERAL) (ROUTINE) W/OUT ABNORMAL FINDINGS: ICD-10-CM

## 2025-01-15 DIAGNOSIS — R92.8 OTHER ABNORMAL AND INCONCLUSIVE FINDINGS ON DIAGNOSTIC IMAGING OF BREAST: ICD-10-CM

## 2025-01-15 PROCEDURE — G0101: CPT

## 2025-01-15 PROCEDURE — 99397 PER PM REEVAL EST PAT 65+ YR: CPT | Mod: GY

## 2025-02-10 PROBLEM — N60.91 ATYPICAL LOBULAR HYPERPLASIA (ALH) OF RIGHT BREAST: Status: ACTIVE | Noted: 2025-02-10

## 2025-02-11 ENCOUNTER — APPOINTMENT (OUTPATIENT)
Dept: PLASTIC SURGERY | Facility: CLINIC | Age: 80
End: 2025-02-11
Payer: MEDICARE

## 2025-02-11 VITALS
WEIGHT: 135 LBS | TEMPERATURE: 97.6 F | OXYGEN SATURATION: 99 % | SYSTOLIC BLOOD PRESSURE: 138 MMHG | BODY MASS INDEX: 21.69 KG/M2 | HEART RATE: 68 BPM | DIASTOLIC BLOOD PRESSURE: 77 MMHG | HEIGHT: 66 IN

## 2025-02-11 DIAGNOSIS — Z80.3 FAMILY HISTORY OF MALIGNANT NEOPLASM OF BREAST: ICD-10-CM

## 2025-02-11 DIAGNOSIS — N60.91 UNSPECIFIED BENIGN MAMMARY DYSPLASIA OF RIGHT BREAST: ICD-10-CM

## 2025-02-11 PROCEDURE — 99214 OFFICE O/P EST MOD 30 MIN: CPT

## 2025-02-25 PROCEDURE — 88321 CONSLTJ&REPRT SLD PREP ELSWR: CPT

## 2025-02-26 LAB — SURGICAL PATHOLOGY STUDY: SIGNIFICANT CHANGE UP

## 2025-02-27 ENCOUNTER — OUTPATIENT (OUTPATIENT)
Dept: OUTPATIENT SERVICES | Facility: HOSPITAL | Age: 80
LOS: 1 days | Discharge: HOME | End: 2025-02-27

## 2025-02-27 DIAGNOSIS — Z96.649 PRESENCE OF UNSPECIFIED ARTIFICIAL HIP JOINT: Chronic | ICD-10-CM

## 2025-02-27 DIAGNOSIS — Z96.641 PRESENCE OF RIGHT ARTIFICIAL HIP JOINT: Chronic | ICD-10-CM

## 2025-02-27 DIAGNOSIS — C85.90 NON-HODGKIN LYMPHOMA, UNSPECIFIED, UNSPECIFIED SITE: ICD-10-CM

## 2025-02-27 DIAGNOSIS — Z96.612 PRESENCE OF LEFT ARTIFICIAL SHOULDER JOINT: Chronic | ICD-10-CM

## 2025-02-27 DIAGNOSIS — Z90.722 ACQUIRED ABSENCE OF OVARIES, BILATERAL: Chronic | ICD-10-CM

## 2025-03-03 ENCOUNTER — APPOINTMENT (OUTPATIENT)
Dept: HEMATOLOGY ONCOLOGY | Facility: CLINIC | Age: 80
End: 2025-03-03
Payer: MEDICARE

## 2025-03-03 VITALS
BODY MASS INDEX: 21.53 KG/M2 | DIASTOLIC BLOOD PRESSURE: 81 MMHG | HEART RATE: 62 BPM | WEIGHT: 133.38 LBS | TEMPERATURE: 96.4 F | SYSTOLIC BLOOD PRESSURE: 137 MMHG | OXYGEN SATURATION: 97 % | RESPIRATION RATE: 16 BRPM

## 2025-03-03 DIAGNOSIS — N60.91 UNSPECIFIED BENIGN MAMMARY DYSPLASIA OF RIGHT BREAST: ICD-10-CM

## 2025-03-03 DIAGNOSIS — Z80.3 FAMILY HISTORY OF MALIGNANT NEOPLASM OF BREAST: ICD-10-CM

## 2025-03-03 PROCEDURE — 99215 OFFICE O/P EST HI 40 MIN: CPT

## 2025-05-11 NOTE — H&P PST ADULT - PULMONARY EMBOLUS
.CC:   Chief Complaint   Patient presents with    Arrythmia/Palpitations        PCP: Shauna Baez MD    History of Present Illness: Patient is a 83 year old female with PMH sig for aflutter/fib on eliquis who presented with chest fluttering.   Came in with afib with rvr, had some jaw discomfort that seemed rate related. Also received ntg. Had a similar episode 2 weeks ago and was in ER with similar symptoms. Saw cardiology in interim and was recommended to potentially have stress test soon.    PMH  Past Medical History:    Arrhythmia    SVT AND ATRIAL FLUTTER    Back problem    Cancer (HCC)    Breast cancer    Colon adenoma    Compression of lumbar nerve root    Degenerative lumbar spinal stenosis    Diaphragmatic hernia without mention of obstruction or gangrene    Difficult intubation    Disorder of thyroid    Esophageal reflux    Exposure to medical diagnostic radiation    Last treatment 01/2024    Hearing impairment    Roman HA's    High blood pressure    History of stomach ulcers    A FEW YEARS AGO    Hypothyroidism    Mixed hyperlipidemia    OBESITY    Osteoarthrosis, unspecified whether generalized or localized, unspecified site    Osteopenia    TINNITUS NOS    Unspecified vitamin D deficiency    Visual impairment    glasses        PSH  Past Surgical History[1]     ALL:  Allergies[2]     Home Medications:  Medications Taking[3]      Soc Hx  Social History     Tobacco Use    Smoking status: Never    Smokeless tobacco: Never   Substance Use Topics    Alcohol use: Yes     Comment: 1-3 a week        Fam Hx  Family History[4]    Review of Systems  Comprehensive ROS reviewed and negative except for what's stated above.       OBJECTIVE:  BP (!) 184/75 (BP Location: Right arm)   Pulse 67   Temp 98.2 °F (36.8 °C) (Oral)   Resp 18   Ht 5' 4\" (1.626 m)   Wt 222 lb (100.7 kg)   SpO2 96%   BMI 38.11 kg/m²     Gen- NAD, appears stated age  HEENT- NCAT, anicteric sclera, MMM, OP clear  Lymph- no cervical LAD  CV- RRR no  murmurs. No VIC  Lungs- CTAB, good respiratory effort  Abd- soft, ntnd, no organomegaly, BS+  Derm- no rashes  MSK- good muscle strength and tone, no joint swelling  Neuro- A&OX3, no focal deficits      Diagnostic Data:    CBC/Chem  Recent Labs   Lab 05/10/25  2030 05/11/25  0743   WBC 10.0 9.0   HGB 15.2 13.7   MCV 87.0 87.6   .0 161.0       Recent Labs   Lab 05/10/25  2030 05/11/25  0743    141   K 4.1 5.4*    108   CO2 27.0 31.0   BUN 34* 28*   CREATSERUM 1.65* 1.19*   * 109*   CA 9.7 9.4   MG  --  1.9       Recent Labs   Lab 05/10/25  2030   ALT 20   AST 26   ALB 4.6       No results for input(s): \"TROP\" in the last 168 hours.    Additional Diagnostics: personally reviewed EKG- last in series 5/10 nsr    CXR: image personally reviewed- clear    Radiology: XR CHEST AP/PA (1 VIEW) (CPT=71045)  Result Date: 4/24/2025  PROCEDURE:  XR CHEST AP/PA (1 VIEW) (CPT=71045)  TECHNIQUE:  AP chest radiograph was obtained.  COMPARISON:  [facility], XR, XR CHEST AP PORTABLE  (CPT=71045), 5/12/2024, 12:29 PM.  INDICATIONS:  sob shortness of breath  PATIENT STATED HISTORY: (As transcribed by Technologist)  Patient offered no additional history at this time.    FINDINGS:  Tortuous ectatic aorta present.  Cardiac shadow is enlarged..  The lungs are clear of active--appearing disease process.  The costophrenic angles are sharp.  There is no active disease seen.            CONCLUSION:  Tortuous ectatic aorta.  Cardiac shadow is enlarged.  No acute disease seen.   LOCATION:  [id number]      Dictated by (CST): Panda Mcmillan MD on 4/24/2025 at 1:04 PM     Finalized by (CST): Panda Mcmillan MD on 4/24/2025 at 1:04 PM          Available outpatient records reviewed--    ASSESSMENT / PLAN:     83-year-old woman with history of A-fib who presented with palpitations and jaw discomfort    Palpitations   jaw discomfort  A-fib with RVR  Mildly elevated troponin  -Currently in normal sinus rhythm  - Patient's symptoms on  presentation may have been related to uncontrolled A-fib plus/minus cardiac ischemic process  - Continue anticoagulation and beta-blocker  - Stress test planned tomorrow    Hypertension, elevated today  - Hold hydrochlorothiazide and ACE inhibitor given abnormal renal indices  - Continue beta-blocker and amlodipine started as well.  Could consider as needed hydralazine    HENRI, hyperkalemia  - Creatinine better after IV fluid, will trend renal indices and hold ACE and thiazide        **Certification      PHYSICIAN Certification of Need for Inpatient Hospitalization - Initial Certification    Patient will require inpatient services that will reasonably be expected to span two midnight's based on the clinical documentation in H+P.   Based on patients current state of illness, I anticipate that, after discharge, patient will require TBD.      Siena Garcia MD  [facility] Hospitalist  Pager [phone]  Answering Service number: [phone]         [1]   Past Surgical History:  Procedure Laterality Date    Arthroscopy of joint unlisted Right     knee    Colonoscopy  09/2003    normal    Colonoscopy  10/6/11 - [facility]    adenoma, hemorrhoids. repeat 2021.    Colonoscopy,diagnostic  11/17/2016    normal    Excisional biospy right  1995    benign    Hernia surgery  1985    inguinal    Hysterectomy  1985    with BSO    Lumbar spine surgery      Repair rotator cuff,acute Right 06/2014    Tonsillectomy      removed in childhood around age 10    Upper gi endoscopy - referral  7/9/12 - [facility]    small gastric erosion, no etiology for bleeding identifiedesophageal and gastric bx negative    Upper gi endoscopy,diagnosis  09/2003    hiatal hernia   [2]   Allergies  Allergen Reactions    Erythromycin Base NAUSEA AND VOMITING    Talwin [Pentazocine Lactate] NAUSEA AND VOMITING     CRAMP    Levofloxacin [Quixin]      Pt does want to take due to possible side effects     Daypro [Oxaprozin] NAUSEA ONLY    Tetracycline Base RASH   [3]    Outpatient Medications Marked as Taking for the 5/10/25 encounter (Hospital Encounter)   Medication Sig Dispense Refill    anastrozole 1 MG Oral Tab tab Take 1 tablet (1 mg total) by mouth at noon.      Vitamin C 500 MG Oral Tab Take 1 tablet (500 mg total) by mouth nightly.      apixaban 5 MG Oral Tab Take 1 tablet (5 mg total) by mouth in the morning and 1 tablet (5 mg total) before bedtime. Can resume POD 7.      aspirin 81 MG Oral Tab EC Take 1 tablet (81 mg total) by mouth nightly. Can resume POD 7      levothyroxine 88 MCG Oral Tab Take 1 tablet (88 mcg total) by mouth before breakfast.      Lisinopril-hydroCHLOROthiazide 20-12.5 MG Oral Tab Take 1 tablet by mouth daily. 90 tablet 3    lisinopril 20 MG Oral Tab Take 1 tablet (20 mg total) by mouth daily. (Patient taking differently: Take 1 tablet (20 mg total) by mouth nightly.) 90 tablet 3    metoprolol tartrate 50 MG Oral Tab Take 1 tablet (50 mg total) by mouth 2 (two) times daily. 180 tablet 3    CALCIUM 600 + D OR Take 1 tablet by mouth in the morning.     [4]   Family History  Problem Relation Age of Onset    Breast Cancer Sister 46        dx 46 passed 51    Other (Other) Sister         hypothyroid    Lipids Father     Hypertension Father     Heart Disorder Father     Other (Other) Daughter         hypothyroid    Other (Other) Daughter         hypothyroid    Other (Other) Daughter         hypothyroid    Breast Cancer Maternal Aunt 60        age at dx 60    Breast Cancer Cousin 48        dx 48      no

## 2025-05-27 ENCOUNTER — OUTPATIENT (OUTPATIENT)
Dept: OUTPATIENT SERVICES | Facility: HOSPITAL | Age: 80
LOS: 1 days | Discharge: HOME | End: 2025-05-27

## 2025-05-27 DIAGNOSIS — Z90.722 ACQUIRED ABSENCE OF OVARIES, BILATERAL: Chronic | ICD-10-CM

## 2025-05-27 DIAGNOSIS — Z96.649 PRESENCE OF UNSPECIFIED ARTIFICIAL HIP JOINT: Chronic | ICD-10-CM

## 2025-05-27 DIAGNOSIS — C85.90 NON-HODGKIN LYMPHOMA, UNSPECIFIED, UNSPECIFIED SITE: ICD-10-CM

## 2025-05-27 DIAGNOSIS — Z96.641 PRESENCE OF RIGHT ARTIFICIAL HIP JOINT: Chronic | ICD-10-CM

## 2025-05-27 DIAGNOSIS — Z96.612 PRESENCE OF LEFT ARTIFICIAL SHOULDER JOINT: Chronic | ICD-10-CM

## 2025-06-02 ENCOUNTER — APPOINTMENT (OUTPATIENT)
Dept: HEMATOLOGY ONCOLOGY | Facility: CLINIC | Age: 80
End: 2025-06-02
Payer: MEDICARE

## 2025-06-02 ENCOUNTER — RESULT REVIEW (OUTPATIENT)
Age: 80
End: 2025-06-02

## 2025-06-02 VITALS
RESPIRATION RATE: 16 BRPM | WEIGHT: 131.15 LBS | SYSTOLIC BLOOD PRESSURE: 136 MMHG | OXYGEN SATURATION: 98 % | TEMPERATURE: 97 F | DIASTOLIC BLOOD PRESSURE: 83 MMHG | HEART RATE: 63 BPM | BODY MASS INDEX: 21.17 KG/M2

## 2025-06-02 DIAGNOSIS — C82.95: ICD-10-CM

## 2025-06-02 DIAGNOSIS — D80.1 NONFAMILIAL HYPOGAMMAGLOBULINEMIA: ICD-10-CM

## 2025-06-02 DIAGNOSIS — N60.91 UNSPECIFIED BENIGN MAMMARY DYSPLASIA OF RIGHT BREAST: ICD-10-CM

## 2025-06-02 DIAGNOSIS — Z92.3 PERSONAL HISTORY OF IRRADIATION: ICD-10-CM

## 2025-06-02 LAB
BASOPHILS # BLD AUTO: 0.05 K/UL — SIGNIFICANT CHANGE UP (ref 0–0.2)
BASOPHILS NFR BLD AUTO: 1.1 % — SIGNIFICANT CHANGE UP (ref 0–2)
EOSINOPHIL # BLD AUTO: 0.16 K/UL — SIGNIFICANT CHANGE UP (ref 0–0.5)
EOSINOPHIL NFR BLD AUTO: 3.4 % — SIGNIFICANT CHANGE UP (ref 0–6)
HCT VFR BLD CALC: 40.4 % — SIGNIFICANT CHANGE UP (ref 34.5–45)
HGB BLD-MCNC: 13.6 G/DL — SIGNIFICANT CHANGE UP (ref 11.5–15.5)
IMM GRANULOCYTES NFR BLD AUTO: 0.2 % — SIGNIFICANT CHANGE UP (ref 0–0.9)
LYMPHOCYTES # BLD AUTO: 1.27 K/UL — SIGNIFICANT CHANGE UP (ref 1–3.3)
LYMPHOCYTES # BLD AUTO: 27.4 % — SIGNIFICANT CHANGE UP (ref 13–44)
MCHC RBC-ENTMCNC: 30.5 PG — SIGNIFICANT CHANGE UP (ref 27–34)
MCHC RBC-ENTMCNC: 33.7 G/DL — SIGNIFICANT CHANGE UP (ref 32–36)
MCV RBC AUTO: 90.6 FL — SIGNIFICANT CHANGE UP (ref 80–100)
MONOCYTES # BLD AUTO: 0.53 K/UL — SIGNIFICANT CHANGE UP (ref 0–0.9)
MONOCYTES NFR BLD AUTO: 11.4 % — SIGNIFICANT CHANGE UP (ref 2–14)
NEUTROPHILS # BLD AUTO: 2.62 K/UL — SIGNIFICANT CHANGE UP (ref 1.8–7.4)
NEUTROPHILS NFR BLD AUTO: 56.5 % — SIGNIFICANT CHANGE UP (ref 43–77)
NRBC BLD AUTO-RTO: 0 /100 WBCS — SIGNIFICANT CHANGE UP (ref 0–0)
PLATELET # BLD AUTO: 150 K/UL — SIGNIFICANT CHANGE UP (ref 150–400)
RBC # BLD: 4.46 M/UL — SIGNIFICANT CHANGE UP (ref 3.8–5.2)
RBC # FLD: 12.8 % — SIGNIFICANT CHANGE UP (ref 10.3–14.5)
WBC # BLD: 4.64 K/UL — SIGNIFICANT CHANGE UP (ref 3.8–10.5)
WBC # FLD AUTO: 4.64 K/UL — SIGNIFICANT CHANGE UP (ref 3.8–10.5)

## 2025-06-02 PROCEDURE — 99213 OFFICE O/P EST LOW 20 MIN: CPT

## 2025-06-02 PROCEDURE — G2211 COMPLEX E/M VISIT ADD ON: CPT

## 2025-06-03 LAB
ALBUMIN SERPL ELPH-MCNC: 4.3 G/DL
ALP BLD-CCNC: 75 U/L
ALT SERPL-CCNC: 32 U/L
ANION GAP SERPL CALC-SCNC: 13 MMOL/L
AST SERPL-CCNC: 29 U/L
BILIRUB SERPL-MCNC: 0.5 MG/DL
BUN SERPL-MCNC: 18 MG/DL
CALCIUM SERPL-MCNC: 9.9 MG/DL
CHLORIDE SERPL-SCNC: 105 MMOL/L
CO2 SERPL-SCNC: 25 MMOL/L
CREAT SERPL-MCNC: 0.79 MG/DL
DEPRECATED KAPPA LC FREE/LAMBDA SER: 1.24 RATIO
EGFRCR SERPLBLD CKD-EPI 2021: 76 ML/MIN/1.73M2
GLUCOSE SERPL-MCNC: 92 MG/DL
IGA SERPL-MCNC: 62 MG/DL
IGG SERPL-MCNC: 474 MG/DL
IGM SERPL-MCNC: 209 MG/DL
KAPPA LC CSF-MCNC: 1.27 MG/DL
KAPPA LC SERPL-MCNC: 1.57 MG/DL
LDH SERPL-CCNC: 219 U/L
POTASSIUM SERPL-SCNC: 4.5 MMOL/L
PROT SERPL-MCNC: 5.8 G/DL
SODIUM SERPL-SCNC: 142 MMOL/L

## 2025-08-28 ENCOUNTER — APPOINTMENT (OUTPATIENT)
Dept: ORTHOPEDIC SURGERY | Facility: CLINIC | Age: 80
End: 2025-08-28

## 2025-09-08 ENCOUNTER — APPOINTMENT (OUTPATIENT)
Dept: HEMATOLOGY ONCOLOGY | Facility: CLINIC | Age: 80
End: 2025-09-08
Payer: MEDICARE

## 2025-09-08 ENCOUNTER — APPOINTMENT (OUTPATIENT)
Dept: ORTHOPEDIC SURGERY | Facility: CLINIC | Age: 80
End: 2025-09-08
Payer: MEDICARE

## 2025-09-08 VITALS
HEART RATE: 62 BPM | HEIGHT: 66 IN | OXYGEN SATURATION: 98 % | DIASTOLIC BLOOD PRESSURE: 72 MMHG | WEIGHT: 132.5 LBS | SYSTOLIC BLOOD PRESSURE: 124 MMHG | BODY MASS INDEX: 21.29 KG/M2 | TEMPERATURE: 97.4 F | RESPIRATION RATE: 16 BRPM

## 2025-09-08 DIAGNOSIS — N60.91 UNSPECIFIED BENIGN MAMMARY DYSPLASIA OF RIGHT BREAST: ICD-10-CM

## 2025-09-08 DIAGNOSIS — M18.12 UNILATERAL PRIMARY OSTEOARTHRITIS OF FIRST CARPOMETACARPAL JOINT, LEFT HAND: ICD-10-CM

## 2025-09-08 PROCEDURE — 99213 OFFICE O/P EST LOW 20 MIN: CPT

## 2025-09-08 PROCEDURE — 20605 DRAIN/INJ JOINT/BURSA W/O US: CPT | Mod: LT

## 2025-09-08 PROCEDURE — 73140 X-RAY EXAM OF FINGER(S): CPT | Mod: LT

## 2025-09-08 PROCEDURE — 99214 OFFICE O/P EST MOD 30 MIN: CPT | Mod: 25
